# Patient Record
Sex: MALE | Race: WHITE | Employment: OTHER | URBAN - METROPOLITAN AREA
[De-identification: names, ages, dates, MRNs, and addresses within clinical notes are randomized per-mention and may not be internally consistent; named-entity substitution may affect disease eponyms.]

---

## 2017-09-18 ENCOUNTER — HOSPITAL ENCOUNTER (OUTPATIENT)
Dept: CT IMAGING | Age: 82
Discharge: HOME | End: 2017-09-18
Attending: INTERNAL MEDICINE
Payer: MEDICARE

## 2017-09-18 DIAGNOSIS — R41.3 MEMORY LOSS: ICD-10-CM

## 2017-09-18 PROCEDURE — 70450 CT HEAD/BRAIN W/O DYE: CPT

## 2017-09-18 NOTE — PROGRESS NOTES
Ct Head report reviewed and showed brain atrophy and possible mini-strokes that could have affected memory.  Please inform the patient

## 2017-12-19 ENCOUNTER — HOSPITAL ENCOUNTER (OUTPATIENT)
Dept: MAMMOGRAPHY | Age: 82
Discharge: HOME | End: 2017-12-19
Attending: INTERNAL MEDICINE
Payer: MEDICARE

## 2017-12-19 DIAGNOSIS — M81.0 OSTEOPOROSIS, UNSPECIFIED OSTEOPOROSIS TYPE, UNSPECIFIED PATHOLOGICAL FRACTURE PRESENCE: ICD-10-CM

## 2017-12-19 PROCEDURE — 77080 DXA BONE DENSITY AXIAL: CPT

## 2017-12-26 NOTE — PROGRESS NOTES
DEXA bone density report reviewed and shows osteopenia, but a FRAX score was not provided so will discuss need for treatment at next visit

## 2018-01-01 ENCOUNTER — APPOINTMENT (OUTPATIENT)
Dept: CT IMAGING | Age: 83
End: 2018-01-01
Attending: EMERGENCY MEDICINE
Payer: MEDICARE

## 2018-01-01 ENCOUNTER — HOSPITAL ENCOUNTER (INPATIENT)
Age: 83
LOS: 1 days | Discharge: HOME HEALTH | DRG: 193 | End: 2019-01-02
Attending: EMERGENCY MEDICINE | Admitting: INTERNAL MEDICINE
Payer: MEDICARE

## 2018-01-01 ENCOUNTER — APPOINTMENT (OUTPATIENT)
Dept: CT IMAGING | Age: 83
DRG: 193 | End: 2018-01-01
Attending: EMERGENCY MEDICINE
Payer: MEDICARE

## 2018-01-01 ENCOUNTER — APPOINTMENT (OUTPATIENT)
Dept: ULTRASOUND IMAGING | Age: 83
DRG: 193 | End: 2018-01-01
Attending: INTERNAL MEDICINE
Payer: MEDICARE

## 2018-01-01 ENCOUNTER — APPOINTMENT (OUTPATIENT)
Dept: MRI IMAGING | Age: 83
DRG: 193 | End: 2018-01-01
Attending: INTERNAL MEDICINE
Payer: MEDICARE

## 2018-01-01 ENCOUNTER — APPOINTMENT (OUTPATIENT)
Dept: GENERAL RADIOLOGY | Age: 83
DRG: 193 | End: 2018-01-01
Attending: EMERGENCY MEDICINE
Payer: MEDICARE

## 2018-01-01 ENCOUNTER — HOSPITAL ENCOUNTER (EMERGENCY)
Age: 83
Discharge: HOME | End: 2018-04-25
Attending: EMERGENCY MEDICINE
Payer: MEDICARE

## 2018-01-01 ENCOUNTER — HOSPITAL ENCOUNTER (EMERGENCY)
Age: 83
Discharge: HOME | End: 2018-05-01
Attending: EMERGENCY MEDICINE
Payer: MEDICARE

## 2018-01-01 VITALS
OXYGEN SATURATION: 98 % | BODY MASS INDEX: 25.1 KG/M2 | RESPIRATION RATE: 16 BRPM | WEIGHT: 147 LBS | HEIGHT: 64 IN | HEART RATE: 62 BPM | TEMPERATURE: 98 F | DIASTOLIC BLOOD PRESSURE: 70 MMHG | SYSTOLIC BLOOD PRESSURE: 148 MMHG

## 2018-01-01 VITALS
HEART RATE: 67 BPM | BODY MASS INDEX: 25.1 KG/M2 | OXYGEN SATURATION: 97 % | HEIGHT: 64 IN | WEIGHT: 147 LBS | TEMPERATURE: 97.8 F | SYSTOLIC BLOOD PRESSURE: 159 MMHG | DIASTOLIC BLOOD PRESSURE: 69 MMHG | RESPIRATION RATE: 16 BRPM

## 2018-01-01 DIAGNOSIS — R41.82 ALTERED MENTAL STATUS, UNSPECIFIED ALTERED MENTAL STATUS TYPE: ICD-10-CM

## 2018-01-01 DIAGNOSIS — S01.01XA LACERATION OF SCALP, INITIAL ENCOUNTER: Primary | ICD-10-CM

## 2018-01-01 DIAGNOSIS — Z48.02 ENCOUNTER FOR STAPLE REMOVAL: Primary | ICD-10-CM

## 2018-01-01 DIAGNOSIS — J18.9 COMMUNITY ACQUIRED PNEUMONIA OF RIGHT LOWER LOBE OF LUNG: Primary | ICD-10-CM

## 2018-01-01 LAB
ALBUMIN SERPL-MCNC: 4.3 G/DL (ref 3.2–4.6)
ALBUMIN/GLOB SERPL: 1.3 {RATIO}
ALP SERPL-CCNC: 100 U/L (ref 50–136)
ALT SERPL-CCNC: 26 U/L (ref 12–65)
ANION GAP SERPL CALC-SCNC: 10 MMOL/L
APPEARANCE UR: CLEAR
AST SERPL-CCNC: 20 U/L (ref 15–37)
BASOPHILS # BLD: 0 K/UL (ref 0–0.2)
BASOPHILS NFR BLD: 1 % (ref 0–2)
BILIRUB SERPL-MCNC: 0.8 MG/DL (ref 0.2–1.1)
BILIRUB UR QL: NEGATIVE
BUN SERPL-MCNC: 29 MG/DL (ref 8–23)
CALCIUM SERPL-MCNC: 9.5 MG/DL (ref 8.3–10.4)
CHLORIDE SERPL-SCNC: 102 MMOL/L (ref 98–107)
CO2 SERPL-SCNC: 25 MMOL/L (ref 21–32)
COLOR UR: YELLOW
CREAT SERPL-MCNC: 1.98 MG/DL (ref 0.8–1.5)
DIFFERENTIAL METHOD BLD: ABNORMAL
EOSINOPHIL # BLD: 0 K/UL (ref 0–0.8)
EOSINOPHIL NFR BLD: 0 % (ref 0.5–7.8)
ERYTHROCYTE [DISTWIDTH] IN BLOOD BY AUTOMATED COUNT: 12.9 % (ref 11.9–14.6)
FLUAV AG NPH QL IA: NEGATIVE
FLUBV AG NPH QL IA: NEGATIVE
GLOBULIN SER CALC-MCNC: 3.3 G/DL (ref 2.3–3.5)
GLUCOSE SERPL-MCNC: 129 MG/DL (ref 65–100)
GLUCOSE UR STRIP.AUTO-MCNC: NEGATIVE MG/DL
HCT VFR BLD AUTO: 43.7 % (ref 41.1–50.3)
HGB BLD-MCNC: 14.3 G/DL (ref 13.6–17.2)
HGB UR QL STRIP: NEGATIVE
IMM GRANULOCYTES # BLD: 0 K/UL (ref 0–0.5)
IMM GRANULOCYTES NFR BLD AUTO: 0 % (ref 0–5)
KETONES UR QL STRIP.AUTO: NEGATIVE MG/DL
LACTATE BLD-SCNC: 1.98 MMOL/L (ref 0.5–1.9)
LEUKOCYTE ESTERASE UR QL STRIP.AUTO: NEGATIVE
LYMPHOCYTES # BLD: 0.4 K/UL (ref 0.5–4.6)
LYMPHOCYTES NFR BLD: 7 % (ref 13–44)
MCH RBC QN AUTO: 32.9 PG (ref 26.1–32.9)
MCHC RBC AUTO-ENTMCNC: 32.7 G/DL (ref 31.4–35)
MCV RBC AUTO: 100.5 FL (ref 79.6–97.8)
MONOCYTES # BLD: 0.4 K/UL (ref 0.1–1.3)
MONOCYTES NFR BLD: 7 % (ref 4–12)
NEUTS SEG # BLD: 4.9 K/UL (ref 1.7–8.2)
NEUTS SEG NFR BLD: 85 % (ref 43–78)
NITRITE UR QL STRIP.AUTO: NEGATIVE
NRBC # BLD: 0 K/UL (ref 0–0.2)
PH UR STRIP: 6 [PH] (ref 5–9)
PLATELET # BLD AUTO: 284 K/UL (ref 150–450)
PMV BLD AUTO: 8.2 FL (ref 9.4–12.3)
POTASSIUM SERPL-SCNC: 4.7 MMOL/L (ref 3.5–5.1)
PROT SERPL-MCNC: 7.6 G/DL
PROT UR STRIP-MCNC: NEGATIVE MG/DL
RBC # BLD AUTO: 4.35 M/UL (ref 4.23–5.6)
SODIUM SERPL-SCNC: 137 MMOL/L (ref 136–145)
SP GR UR REFRACTOMETRY: 1.02 (ref 1–1.02)
SPECIMEN SOURCE: NORMAL
TROPONIN I SERPL-MCNC: <0.02 NG/ML (ref 0.02–0.05)
UROBILINOGEN UR QL STRIP.AUTO: 1 EU/DL (ref 0.2–1)
WBC # BLD AUTO: 5.7 K/UL (ref 4.3–11.1)

## 2018-01-01 PROCEDURE — 77030020263 HC SOL INJ SOD CL0.9% LFCR 1000ML

## 2018-01-01 PROCEDURE — 87040 BLOOD CULTURE FOR BACTERIA: CPT

## 2018-01-01 PROCEDURE — 99283 EMERGENCY DEPT VISIT LOW MDM: CPT | Performed by: EMERGENCY MEDICINE

## 2018-01-01 PROCEDURE — 70450 CT HEAD/BRAIN W/O DYE: CPT

## 2018-01-01 PROCEDURE — 93880 EXTRACRANIAL BILAT STUDY: CPT

## 2018-01-01 PROCEDURE — 81003 URINALYSIS AUTO W/O SCOPE: CPT

## 2018-01-01 PROCEDURE — 99218 HC RM OBSERVATION: CPT

## 2018-01-01 PROCEDURE — 70551 MRI BRAIN STEM W/O DYE: CPT

## 2018-01-01 PROCEDURE — 74011250636 HC RX REV CODE- 250/636: Performed by: EMERGENCY MEDICINE

## 2018-01-01 PROCEDURE — 85025 COMPLETE CBC W/AUTO DIFF WBC: CPT

## 2018-01-01 PROCEDURE — 96375 TX/PRO/DX INJ NEW DRUG ADDON: CPT | Performed by: EMERGENCY MEDICINE

## 2018-01-01 PROCEDURE — 87804 INFLUENZA ASSAY W/OPTIC: CPT

## 2018-01-01 PROCEDURE — 99285 EMERGENCY DEPT VISIT HI MDM: CPT | Performed by: EMERGENCY MEDICINE

## 2018-01-01 PROCEDURE — 77030019605

## 2018-01-01 PROCEDURE — 71045 X-RAY EXAM CHEST 1 VIEW: CPT

## 2018-01-01 PROCEDURE — 74011000258 HC RX REV CODE- 258: Performed by: EMERGENCY MEDICINE

## 2018-01-01 PROCEDURE — 80053 COMPREHEN METABOLIC PANEL: CPT

## 2018-01-01 PROCEDURE — 77030008031

## 2018-01-01 PROCEDURE — 93005 ELECTROCARDIOGRAM TRACING: CPT | Performed by: EMERGENCY MEDICINE

## 2018-01-01 PROCEDURE — 51798 US URINE CAPACITY MEASURE: CPT

## 2018-01-01 PROCEDURE — 75810000275 HC EMERGENCY DEPT VISIT NO LEVEL OF CARE: Performed by: EMERGENCY MEDICINE

## 2018-01-01 PROCEDURE — 77030008462 HC STPLR SKN PROX J&J -A

## 2018-01-01 PROCEDURE — 84484 ASSAY OF TROPONIN QUANT: CPT

## 2018-01-01 PROCEDURE — 74011250636 HC RX REV CODE- 250/636: Performed by: INTERNAL MEDICINE

## 2018-01-01 PROCEDURE — 96365 THER/PROPH/DIAG IV INF INIT: CPT | Performed by: EMERGENCY MEDICINE

## 2018-01-01 PROCEDURE — 83605 ASSAY OF LACTIC ACID: CPT

## 2018-01-01 PROCEDURE — 75810000293 HC SIMP/SUPERF WND  RPR: Performed by: EMERGENCY MEDICINE

## 2018-01-01 PROCEDURE — 96367 TX/PROPH/DG ADDL SEQ IV INF: CPT | Performed by: EMERGENCY MEDICINE

## 2018-01-01 PROCEDURE — 74011250637 HC RX REV CODE- 250/637: Performed by: INTERNAL MEDICINE

## 2018-01-01 RX ORDER — SODIUM CHLORIDE 0.9 % (FLUSH) 0.9 %
5-10 SYRINGE (ML) INJECTION AS NEEDED
Status: DISCONTINUED | OUTPATIENT
Start: 2018-01-01 | End: 2019-01-01 | Stop reason: HOSPADM

## 2018-01-01 RX ORDER — HEPARIN SODIUM 5000 [USP'U]/ML
5000 INJECTION, SOLUTION INTRAVENOUS; SUBCUTANEOUS EVERY 8 HOURS
Status: DISCONTINUED | OUTPATIENT
Start: 2018-01-01 | End: 2019-01-01 | Stop reason: HOSPADM

## 2018-01-01 RX ORDER — BISACODYL 5 MG
5 TABLET, DELAYED RELEASE (ENTERIC COATED) ORAL DAILY PRN
Status: DISCONTINUED | OUTPATIENT
Start: 2018-01-01 | End: 2019-01-01 | Stop reason: HOSPADM

## 2018-01-01 RX ORDER — SIMVASTATIN 20 MG/1
20 TABLET, FILM COATED ORAL
Status: DISCONTINUED | OUTPATIENT
Start: 2018-01-01 | End: 2019-01-01 | Stop reason: HOSPADM

## 2018-01-01 RX ORDER — BUPROPION HYDROCHLORIDE 150 MG/1
150 TABLET, EXTENDED RELEASE ORAL DAILY
Status: DISCONTINUED | OUTPATIENT
Start: 2019-01-01 | End: 2019-01-01 | Stop reason: HOSPADM

## 2018-01-01 RX ORDER — ONDANSETRON 2 MG/ML
4 INJECTION INTRAMUSCULAR; INTRAVENOUS
Status: DISCONTINUED | OUTPATIENT
Start: 2018-01-01 | End: 2019-01-01 | Stop reason: HOSPADM

## 2018-01-01 RX ORDER — AMLODIPINE BESYLATE 10 MG/1
5 TABLET ORAL DAILY
Status: DISCONTINUED | OUTPATIENT
Start: 2019-01-01 | End: 2019-01-01

## 2018-01-01 RX ORDER — ACETAMINOPHEN 325 MG/1
650 TABLET ORAL
Status: DISCONTINUED | OUTPATIENT
Start: 2018-01-01 | End: 2019-01-01 | Stop reason: HOSPADM

## 2018-01-01 RX ORDER — PANTOPRAZOLE SODIUM 40 MG/1
40 TABLET, DELAYED RELEASE ORAL
Status: DISCONTINUED | OUTPATIENT
Start: 2019-01-01 | End: 2019-01-01 | Stop reason: HOSPADM

## 2018-01-01 RX ORDER — SODIUM CHLORIDE 9 MG/ML
125 INJECTION, SOLUTION INTRAVENOUS CONTINUOUS
Status: DISCONTINUED | OUTPATIENT
Start: 2018-01-01 | End: 2019-01-01

## 2018-01-01 RX ORDER — SODIUM CHLORIDE 0.9 % (FLUSH) 0.9 %
5-10 SYRINGE (ML) INJECTION EVERY 8 HOURS
Status: DISCONTINUED | OUTPATIENT
Start: 2018-01-01 | End: 2019-01-01 | Stop reason: HOSPADM

## 2018-01-01 RX ORDER — GUAIFENESIN 100 MG/5ML
81 LIQUID (ML) ORAL DAILY
Status: DISCONTINUED | OUTPATIENT
Start: 2019-01-01 | End: 2019-01-01 | Stop reason: HOSPADM

## 2018-01-01 RX ORDER — ONDANSETRON 2 MG/ML
4 INJECTION INTRAMUSCULAR; INTRAVENOUS
Status: COMPLETED | OUTPATIENT
Start: 2018-01-01 | End: 2018-01-01

## 2018-01-01 RX ADMIN — HEPARIN SODIUM 5000 UNITS: 5000 INJECTION INTRAVENOUS; SUBCUTANEOUS at 18:16

## 2018-01-01 RX ADMIN — SIMVASTATIN 20 MG: 20 TABLET, FILM COATED ORAL at 22:52

## 2018-01-01 RX ADMIN — CEFTRIAXONE SODIUM 2 G: 2 INJECTION, POWDER, FOR SOLUTION INTRAMUSCULAR; INTRAVENOUS at 13:57

## 2018-01-01 RX ADMIN — SODIUM CHLORIDE 10 ML: 9 INJECTION, SOLUTION INTRAMUSCULAR; INTRAVENOUS; SUBCUTANEOUS at 18:21

## 2018-01-01 RX ADMIN — AZITHROMYCIN MONOHYDRATE 500 MG: 500 INJECTION, POWDER, LYOPHILIZED, FOR SOLUTION INTRAVENOUS at 15:23

## 2018-01-01 RX ADMIN — SODIUM CHLORIDE 10 ML: 9 INJECTION, SOLUTION INTRAMUSCULAR; INTRAVENOUS; SUBCUTANEOUS at 22:52

## 2018-01-01 RX ADMIN — SODIUM CHLORIDE 500 ML: 900 INJECTION, SOLUTION INTRAVENOUS at 18:17

## 2018-01-01 RX ADMIN — SODIUM CHLORIDE 500 ML: 900 INJECTION, SOLUTION INTRAVENOUS at 13:58

## 2018-01-01 RX ADMIN — SODIUM CHLORIDE 125 ML/HR: 900 INJECTION, SOLUTION INTRAVENOUS at 18:53

## 2018-01-01 RX ADMIN — ONDANSETRON 4 MG: 2 INJECTION INTRAMUSCULAR; INTRAVENOUS at 12:30

## 2018-02-24 ENCOUNTER — HOSPITAL ENCOUNTER (OUTPATIENT)
Age: 83
Setting detail: OBSERVATION
Discharge: NURSING, REHAB OR LONG-TERM CARE | End: 2018-02-25
Attending: EMERGENCY MEDICINE | Admitting: INTERNAL MEDICINE
Payer: MEDICARE

## 2018-02-24 ENCOUNTER — APPOINTMENT (OUTPATIENT)
Dept: GENERAL RADIOLOGY | Age: 83
End: 2018-02-24
Attending: EMERGENCY MEDICINE
Payer: MEDICARE

## 2018-02-24 ENCOUNTER — APPOINTMENT (OUTPATIENT)
Dept: CT IMAGING | Age: 83
End: 2018-02-24
Attending: EMERGENCY MEDICINE
Payer: MEDICARE

## 2018-02-24 DIAGNOSIS — G45.9 TRANSIENT CEREBRAL ISCHEMIA, UNSPECIFIED TYPE: Primary | ICD-10-CM

## 2018-02-24 DIAGNOSIS — N28.9 ACUTE RENAL INSUFFICIENCY: ICD-10-CM

## 2018-02-24 PROBLEM — E86.0 DEHYDRATION: Status: ACTIVE | Noted: 2018-02-24

## 2018-02-24 LAB
ALBUMIN SERPL-MCNC: 3.8 G/DL (ref 3.2–4.6)
ALBUMIN/GLOB SERPL: 1.2 {RATIO} (ref 1.2–3.5)
ALP SERPL-CCNC: 81 U/L (ref 50–136)
ALT SERPL-CCNC: 13 U/L (ref 12–65)
ANION GAP SERPL CALC-SCNC: 12 MMOL/L (ref 7–16)
APPEARANCE UR: ABNORMAL
AST SERPL-CCNC: 20 U/L (ref 15–37)
BACTERIA URNS QL MICRO: 0 /HPF
BASOPHILS # BLD: 0 K/UL (ref 0–0.2)
BASOPHILS NFR BLD: 0 % (ref 0–2)
BILIRUB SERPL-MCNC: 0.7 MG/DL (ref 0.2–1.1)
BILIRUB UR QL: ABNORMAL
BUN SERPL-MCNC: 29 MG/DL (ref 8–23)
CALCIUM SERPL-MCNC: 9.3 MG/DL (ref 8.3–10.4)
CASTS URNS QL MICRO: ABNORMAL /LPF
CHLORIDE SERPL-SCNC: 103 MMOL/L (ref 98–107)
CO2 SERPL-SCNC: 26 MMOL/L (ref 21–32)
COLOR UR: ABNORMAL
CREAT SERPL-MCNC: 2.1 MG/DL (ref 0.8–1.5)
DIFFERENTIAL METHOD BLD: ABNORMAL
EOSINOPHIL # BLD: 0 K/UL (ref 0–0.8)
EOSINOPHIL NFR BLD: 0 % (ref 0.5–7.8)
EPI CELLS #/AREA URNS HPF: ABNORMAL /HPF
ERYTHROCYTE [DISTWIDTH] IN BLOOD BY AUTOMATED COUNT: 13.5 % (ref 11.9–14.6)
GLOBULIN SER CALC-MCNC: 3.3 G/DL (ref 2.3–3.5)
GLUCOSE SERPL-MCNC: 123 MG/DL (ref 65–100)
GLUCOSE UR STRIP.AUTO-MCNC: NEGATIVE MG/DL
HCT VFR BLD AUTO: 38.8 % (ref 41.1–50.3)
HGB BLD-MCNC: 13.1 G/DL (ref 13.6–17.2)
HGB UR QL STRIP: NEGATIVE
IMM GRANULOCYTES # BLD: 0 K/UL (ref 0–0.5)
IMM GRANULOCYTES NFR BLD AUTO: 0 % (ref 0–5)
KETONES UR QL STRIP.AUTO: ABNORMAL MG/DL
LACTATE BLD-SCNC: 1.8 MMOL/L (ref 0.5–1.9)
LEUKOCYTE ESTERASE UR QL STRIP.AUTO: ABNORMAL
LYMPHOCYTES # BLD: 1.1 K/UL (ref 0.5–4.6)
LYMPHOCYTES NFR BLD: 16 % (ref 13–44)
MAGNESIUM SERPL-MCNC: 2.5 MG/DL (ref 1.8–2.4)
MCH RBC QN AUTO: 33.2 PG (ref 26.1–32.9)
MCHC RBC AUTO-ENTMCNC: 33.8 G/DL (ref 31.4–35)
MCV RBC AUTO: 98.2 FL (ref 79.6–97.8)
MONOCYTES # BLD: 0.5 K/UL (ref 0.1–1.3)
MONOCYTES NFR BLD: 8 % (ref 4–12)
NEUTS SEG # BLD: 4.9 K/UL (ref 1.7–8.2)
NEUTS SEG NFR BLD: 76 % (ref 43–78)
NITRITE UR QL STRIP.AUTO: NEGATIVE
PH UR STRIP: 5 [PH] (ref 5–9)
PLATELET # BLD AUTO: 189 K/UL (ref 150–450)
PMV BLD AUTO: 9.2 FL (ref 10.8–14.1)
POTASSIUM SERPL-SCNC: 4.5 MMOL/L (ref 3.5–5.1)
PROCALCITONIN SERPL-MCNC: 0.1 NG/ML
PROT SERPL-MCNC: 7.1 G/DL (ref 6.3–8.2)
PROT UR STRIP-MCNC: ABNORMAL MG/DL
RBC # BLD AUTO: 3.95 M/UL (ref 4.23–5.67)
SODIUM SERPL-SCNC: 141 MMOL/L (ref 136–145)
SP GR UR REFRACTOMETRY: 1.03 (ref 1–1.02)
TROPONIN I BLD-MCNC: 0.01 NG/ML (ref 0.02–0.05)
UROBILINOGEN UR QL STRIP.AUTO: 1 EU/DL (ref 0.2–1)
WBC # BLD AUTO: 6.5 K/UL (ref 4.3–11.1)
WBC URNS QL MICRO: ABNORMAL /HPF

## 2018-02-24 PROCEDURE — 81003 URINALYSIS AUTO W/O SCOPE: CPT | Performed by: EMERGENCY MEDICINE

## 2018-02-24 PROCEDURE — 84484 ASSAY OF TROPONIN QUANT: CPT

## 2018-02-24 PROCEDURE — 99218 HC RM OBSERVATION: CPT

## 2018-02-24 PROCEDURE — 96361 HYDRATE IV INFUSION ADD-ON: CPT

## 2018-02-24 PROCEDURE — 99285 EMERGENCY DEPT VISIT HI MDM: CPT | Performed by: EMERGENCY MEDICINE

## 2018-02-24 PROCEDURE — 84145 PROCALCITONIN (PCT): CPT | Performed by: EMERGENCY MEDICINE

## 2018-02-24 PROCEDURE — 83605 ASSAY OF LACTIC ACID: CPT

## 2018-02-24 PROCEDURE — 96372 THER/PROPH/DIAG INJ SC/IM: CPT

## 2018-02-24 PROCEDURE — 80053 COMPREHEN METABOLIC PANEL: CPT | Performed by: EMERGENCY MEDICINE

## 2018-02-24 PROCEDURE — 87086 URINE CULTURE/COLONY COUNT: CPT | Performed by: EMERGENCY MEDICINE

## 2018-02-24 PROCEDURE — 74011250637 HC RX REV CODE- 250/637: Performed by: INTERNAL MEDICINE

## 2018-02-24 PROCEDURE — 71045 X-RAY EXAM CHEST 1 VIEW: CPT

## 2018-02-24 PROCEDURE — 70450 CT HEAD/BRAIN W/O DYE: CPT

## 2018-02-24 PROCEDURE — 83735 ASSAY OF MAGNESIUM: CPT | Performed by: EMERGENCY MEDICINE

## 2018-02-24 PROCEDURE — 74011250636 HC RX REV CODE- 250/636: Performed by: EMERGENCY MEDICINE

## 2018-02-24 PROCEDURE — 93005 ELECTROCARDIOGRAM TRACING: CPT | Performed by: INTERNAL MEDICINE

## 2018-02-24 PROCEDURE — 74011250636 HC RX REV CODE- 250/636: Performed by: INTERNAL MEDICINE

## 2018-02-24 PROCEDURE — 96360 HYDRATION IV INFUSION INIT: CPT | Performed by: EMERGENCY MEDICINE

## 2018-02-24 PROCEDURE — 85025 COMPLETE CBC W/AUTO DIFF WBC: CPT | Performed by: EMERGENCY MEDICINE

## 2018-02-24 RX ORDER — SODIUM CHLORIDE 9 MG/ML
100 INJECTION, SOLUTION INTRAVENOUS CONTINUOUS
Status: DISCONTINUED | OUTPATIENT
Start: 2018-02-24 | End: 2018-02-26 | Stop reason: HOSPADM

## 2018-02-24 RX ORDER — SODIUM CHLORIDE 0.9 % (FLUSH) 0.9 %
5-10 SYRINGE (ML) INJECTION AS NEEDED
Status: DISCONTINUED | OUTPATIENT
Start: 2018-02-24 | End: 2018-02-26 | Stop reason: HOSPADM

## 2018-02-24 RX ORDER — GUAIFENESIN 100 MG/5ML
81 LIQUID (ML) ORAL DAILY
Status: DISCONTINUED | OUTPATIENT
Start: 2018-02-25 | End: 2018-02-26 | Stop reason: HOSPADM

## 2018-02-24 RX ORDER — AMLODIPINE BESYLATE 5 MG/1
5 TABLET ORAL DAILY
Status: DISCONTINUED | OUTPATIENT
Start: 2018-02-25 | End: 2018-02-26 | Stop reason: HOSPADM

## 2018-02-24 RX ORDER — HEPARIN SODIUM 5000 [USP'U]/ML
5000 INJECTION, SOLUTION INTRAVENOUS; SUBCUTANEOUS EVERY 12 HOURS
Status: DISCONTINUED | OUTPATIENT
Start: 2018-02-24 | End: 2018-02-26 | Stop reason: HOSPADM

## 2018-02-24 RX ORDER — ONDANSETRON 2 MG/ML
4 INJECTION INTRAMUSCULAR; INTRAVENOUS
Status: DISCONTINUED | OUTPATIENT
Start: 2018-02-24 | End: 2018-02-26 | Stop reason: HOSPADM

## 2018-02-24 RX ORDER — SIMVASTATIN 20 MG/1
20 TABLET, FILM COATED ORAL
Status: DISCONTINUED | OUTPATIENT
Start: 2018-02-24 | End: 2018-02-26 | Stop reason: HOSPADM

## 2018-02-24 RX ORDER — LANOLIN ALCOHOL/MO/W.PET/CERES
400 CREAM (GRAM) TOPICAL DAILY
Status: DISCONTINUED | OUTPATIENT
Start: 2018-02-25 | End: 2018-02-26 | Stop reason: HOSPADM

## 2018-02-24 RX ORDER — SODIUM CHLORIDE 0.9 % (FLUSH) 0.9 %
5-10 SYRINGE (ML) INJECTION EVERY 8 HOURS
Status: DISCONTINUED | OUTPATIENT
Start: 2018-02-24 | End: 2018-02-26 | Stop reason: HOSPADM

## 2018-02-24 RX ORDER — LABETALOL HYDROCHLORIDE 5 MG/ML
5 INJECTION, SOLUTION INTRAVENOUS
Status: DISCONTINUED | OUTPATIENT
Start: 2018-02-24 | End: 2018-02-26 | Stop reason: HOSPADM

## 2018-02-24 RX ORDER — PANTOPRAZOLE SODIUM 40 MG/1
40 TABLET, DELAYED RELEASE ORAL
Status: DISCONTINUED | OUTPATIENT
Start: 2018-02-25 | End: 2018-02-26 | Stop reason: HOSPADM

## 2018-02-24 RX ADMIN — HEPARIN SODIUM 5000 UNITS: 5000 INJECTION, SOLUTION INTRAVENOUS; SUBCUTANEOUS at 20:12

## 2018-02-24 RX ADMIN — SIMVASTATIN 20 MG: 20 TABLET, FILM COATED ORAL at 20:15

## 2018-02-24 RX ADMIN — Medication 10 ML: at 20:12

## 2018-02-24 RX ADMIN — SODIUM CHLORIDE 100 ML/HR: 900 INJECTION, SOLUTION INTRAVENOUS at 20:12

## 2018-02-24 RX ADMIN — SODIUM CHLORIDE 1000 ML: 900 INJECTION, SOLUTION INTRAVENOUS at 16:08

## 2018-02-24 NOTE — IP AVS SNAPSHOT
[address] 
[phone] Patient: Jammie Guzman MRN: [id number] About your hospitalization You were admitted on:  2018 You last received care in the:  [facility] 3 ICU You were discharged on:  2018 Why you were hospitalized Your primary diagnosis was:  Tia (Transient Ischemic Attack) Your diagnoses also included:  Dehydration Follow-up Information Follow up With Details Comments Contact Info Caryl Martin MD  Please make appointment in 1 to 2 weeks [address] [place] [phone] Your Scheduled Appointments 2018  8:40 AM EST New Patient with Ignacio Gutierrez MD  
[facility]) [address] [place] [address]  
[phone] 2018  1:45 PM EDT  
(Arrive by 1:30 PM) Office Visit with Caryl Martin MD  
[address]) [address] [place] [phone] Discharge Orders None A check hill indicates which time of day the medication should be taken. My Medications CONTINUE taking these medications Instructions Each Dose to Equal  
 Morning Noon Evening Bedtime  
 amLODIPine 5 mg tablet Commonly known as:  Ardeen Squire Take 1 Tab by mouth daily. 5 mg Start taking 18  
   
   
   
  
 aspirin 81 mg chewable tablet Take 81 mg by mouth daily. 81 mg  
    
Start taking 18  
   
   
   
  
 lovastatin 40 mg tablet Commonly known as:  MEVACOR Take 1 Tab by mouth nightly. 40 mg Take this evening 18 as prescribed  
  
 magnesium 250 mg Tab Take  by mouth. Start taking 18  
   
   
   
  
 omeprazole 20 mg capsule Commonly known as:  PRILOSEC  
   
 Take 1 Cap by mouth daily. 20 mg  
    
Start taking 2/26/18 Discharge Instructions Transient Ischemic Attack: Care Instructions Your Care Instructions A transient ischemic attack (TIA) is when blood flow to a part of your brain is blocked for a short time. A TIA is like a stroke but usually lasts only a few minutes. A TIA does not cause lasting brain damage. Any vision problems, slurred speech, or other symptoms usually go away in 10 to 20 minutes. But they may last for up to 24 hours. TIAs are often warning signs of a stroke. Some people who have a TIA may have a stroke in the future. A stroke can cause symptoms like those of a TIA. But a stroke causes lasting damage to your brain. You can take steps to help prevent a stroke. One thing you can do is get early treatment. If you have other new symptoms, or if your symptoms do not get better, go back to the emergency room or call your doctor right away. Getting treatment right away may prevent long-term brain damage caused by a stroke. The doctor has checked you carefully, but problems can develop later. If you notice any problems or new symptoms, get medical treatment right away. Follow-up care is a key part of your treatment and safety. Be sure to make and go to all appointments, and call your doctor if you are having problems. It's also a good idea to know your test results and keep a list of the medicines you take. How can you care for yourself at home? Medicines ? · Be safe with medicines. Take your medicines exactly as prescribed. Call your doctor if you think you are having a problem with your medicine. ? · If you take a blood thinner, such as aspirin, be sure you get instructions about how to take your medicine safely. Blood thinners can cause serious bleeding problems. ? · Call your doctor if you are not able to take your medicines for any reason. ? · Do not take any over-the-counter medicines or herbal products without talking to your doctor first.  
? · If you take birth control pills or hormone therapy, talk to your doctor. Ask if these treatments are right for you. ? Lifestyle changes ? · Do not smoke. If you need help quitting, talk to your doctor about stop-smoking programs and medicines. ? · Be active. If your doctor recommends it, get more exercise. Walking is a good choice. Bit by bit, increase the amount you walk every day. Try for at least 30 minutes on most days of the week. You also may want to swim, bike, or do other activities. ? · Eat heart-healthy foods. These include fruits, vegetables, high-fiber foods, fish, and foods that are low in sodium, saturated fat, and trans fat. ? · Stay at a healthy weight. Lose weight if you need to.  
? · Limit alcohol to 2 drinks a day for men and 1 drink a day for women. ?Staying healthy ? · Manage other health problems such as diabetes, high blood pressure, and high cholesterol. ? · Get the flu vaccine every year. When should you call for help? Call 911 anytime you think you may need emergency care. For example, call if: 
? · You have new or worse symptoms of a stroke. These may include: 
¨ Sudden numbness, tingling, weakness, or loss of movement in your face, arm, or leg, especially on only one side of your body. ¨ Sudden vision changes. ¨ Sudden trouble speaking. ¨ Sudden confusion or trouble understanding simple statements. ¨ Sudden problems with walking or balance. ¨ A sudden, severe headache that is different from past headaches. Call 911 even if these symptoms go away in a few minutes. ? · You feel like you are having another TIA. ? Watch closely for changes in your health, and be sure to contact your doctor if you have any problems. Where can you learn more? Go to http://red-alena.info/. Enter ([phone] in the search box to learn more about \"Transient Ischemic Attack: Care Instructions. \" Current as of: March 20, 2017 Content Version: 11.4 © 4312-6695 [employer]. Care instructions adapted under license by [employer] (which disclaims liability or warranty for this information). If you have questions about a medical condition or this instruction, always ask your healthcare professional. [place] [address] any warranty or liability for your use of this information. Dehydration: Care Instructions Your Care Instructions Dehydration happens when your body loses too much fluid. This might happen when you do not drink enough water or you lose large amounts of fluids from your body because of diarrhea, vomiting, or sweating. Severe dehydration can be life-threatening. Water and minerals called electrolytes help put your body fluids back in balance. Learn the early signs of fluid loss, and drink more fluids to prevent dehydration. Follow-up care is a key part of your treatment and safety. Be sure to make and go to all appointments, and call your doctor if you are having problems. It's also a good idea to know your test results and keep a list of the medicines you take. How can you care for yourself at home? · To prevent dehydration, drink plenty of fluids, enough so that your urine is light yellow or clear like water. Choose water and other caffeine-free clear liquids until you feel better. If you have kidney, heart, or liver disease and have to limit fluids, talk with your doctor before you increase the amount of fluids you drink. · If you do not feel like eating or drinking, try taking small sips of water, sports drinks, or other rehydration drinks. · Get plenty of rest. 
To prevent dehydration · Add more fluids to your diet and daily routine, unless your doctor has told you not to. · During hot weather, drink more fluids.  Drink even more fluids if you exercise a lot. Stay away from drinks with alcohol or caffeine. · Watch for the symptoms of dehydration. These include: ¨ A dry, sticky mouth. ¨ Dark yellow urine, and not much of it. ¨ Dry and sunken eyes. ¨ Feeling very tired. · Learn what problems can lead to dehydration. These include: ¨ Diarrhea, fever, and vomiting. ¨ Any illness with a fever, such as pneumonia or the flu. ¨ Activities that cause heavy sweating, such as endurance races and heavy outdoor work in hot or humid weather. ¨ Alcohol or drug abuse or withdrawal. 
¨ Certain medicines, such as cold and allergy pills (antihistamines), diet pills (diuretics), and laxatives. ¨ Certain diseases, such as diabetes, cancer, and heart or kidney disease. When should you call for help? Call 911 anytime you think you may need emergency care. For example, call if: 
? · You passed out (lost consciousness). ?Call your doctor now or seek immediate medical care if: 
? · You are confused and cannot think clearly. ? · You are dizzy or lightheaded, or you feel like you may faint. ? · You have signs of needing more fluids. You have sunken eyes and a dry mouth, and you pass only a little dark urine. ? · You cannot keep fluids down. ? Watch closely for changes in your health, and be sure to contact your doctor if: 
? · You are not making tears. ? · Your skin is very dry and sags slowly back into place after you pinch it. ? · Your mouth and eyes are very dry. Where can you learn more? Go to http://red-alena.info/. Enter G714 in the search box to learn more about \"Dehydration: Care Instructions. \" Current as of: March 20, 2017 Content Version: 11.4 © 7326-4368 [employer]. Care instructions adapted under license by [employer] (which disclaims liability or warranty for this information).  If you have questions about a medical condition or this instruction, always ask your healthcare professional. [place] [address] any warranty or liability for your use of this information. DISCHARGE SUMMARY from Nurse PATIENT INSTRUCTIONS: 
 
 
F-face looks uneven A-arms unable to move or move unevenly S-speech slurred or non-existent T-time-call 911 as soon as signs and symptoms begin-DO NOT go Back to bed or wait to see if you get better-TIME IS BRAIN. Warning Signs of HEART ATTACK Call 911 if you have these symptoms: 
? Chest discomfort. Most heart attacks involve discomfort in the center of the chest that lasts more than a few minutes, or that goes away and comes back. It can feel like uncomfortable pressure, squeezing, fullness, or pain. ? Discomfort in other areas of the upper body. Symptoms can include pain or discomfort in one or both arms, the back, neck, jaw, or stomach. ? Shortness of breath with or without chest discomfort. ? Other signs may include breaking out in a cold sweat, nausea, or lightheadedness. Don't wait more than five minutes to call [address]! Fast action can save your life. Calling 911 is almost always the fastest way to get lifesaving treatment. Emergency Medical Services staff can begin treatment when they arrive  up to an hour sooner than if someone gets to the hospital by car. The discharge information has been reviewed with the patient. The patient verbalized understanding. Discharge medications reviewed with the patient and appropriate educational materials and side effects teaching were provided.  
___________________________________________________________________________ ________________________________________________________ ACO Transitions of Care Introducing [place] [address] Cary Valenzuela offers a voluntary care coordination program to provide high quality service and care to [facility] fee-for-service beneficiaries. Jony Conn was designed to help you enhance your health and well-being through the following services: ? Transitions of Care  support for individuals who are transitioning from one care setting to another (example: Hospital to home). ? Chronic and Complex Care Coordination  support for individuals and caregivers of those with serious or chronic illnesses or with more than one chronic (ongoing) condition and those who take a number of different medications. If you meet specific medical criteria, a [address] [place] [address] Rd may call you directly to coordinate your care with your primary care physician and your other care providers. For questions about the [facility] programs, please, contact your physicians office. For general questions or additional information about Accountable Care Organizations: 
Please visit www.medicare.gov/acos. html or call 1-800-MEDICARE (4-705.739.3633) TTY users should call 2-781.602.8664. Building[employer] Announcement We are excited to announce that we are making your provider's discharge notes available to you in Buildingeye. You will see these notes when they are completed and signed by the physician that discharged you from your recent hospital stay. If you have any questions or concerns about any information you see in [email or web address].rut, please call the Health Information Department where you were seen or reach out to your Primary Care Provider for more information about your plan of care. Introducing [facility]! Dear Param Ibrahim: 
Thank you for requesting a Buildingeye account.   Our records indicate that you already have an active [employer] account. You can access your account anytime at [email or web address] Did you know that you can access your hospital and ER discharge instructions at any time in [employer]? You can also review all of your test results from your hospital stay or ER visit. Additional Information If you have questions, please visit the Frequently Asked Questions section of the [employer] website at [email or web address] Remember, [employer] is NOT to be used for urgent needs. For medical emergencies, dial 911. Now available from your iPhone and Android! Unresulted Labs-Please follow up with your PCP about these lab tests Order Current Status CULTURE, URINE Preliminary result Providers Seen During Your Hospitalization Provider Specialty Primary office phone Mark Lin MD Emergency Medicine [phone] Damion Johnson MD Internal Medicine [phone] Your Primary Care Physician (PCP) Primary Care Physician Office Phone Office Fax Monge Face [phone] You are allergic to the following No active allergies Recent Documentation Height Weight BMI Smoking Status 1.626 m 70.8 kg 26.78 kg/m2 Former Smoker Emergency Contacts Name Discharge Info Relation Home Work Mobile Arther Severance  Son [22] [phone] Patient Belongings The following personal items are in your possession at time of discharge: 
  Dental Appliances: None  Visual Aid: Glasses, Sent home      Home Medications: None   Jewelry: Ring (yellow metal wedding band)  Clothing: Pants, Undergarments, Shirt, Footwear, At bedside    Other Valuables: Wallet, [address], [place], Money clip, Sent home (son sunday)  Personal Items Sent to Safe: none Please provide this summary of care documentation to your next provider. Signatures-by signing, you are acknowledging that this After Visit Summary has been reviewed with you and you have received a copy. Patient Signature:  ____________________________________________________________ Date:  ____________________________________________________________  
  
Tessy Agnes Provider Signature:  ____________________________________________________________ Date:  ____________________________________________________________

## 2018-02-24 NOTE — ED NOTES
TRANSFER - OUT REPORT:    Verbal report given to Sudhakar Fernandes on Silvio Rubio  being transferred to [phone] for routine progression of care       Report consisted of patients Situation, Background, Assessment and   Recommendations(SBAR). Information from the following report(s) SBAR, ED Summary and MAR was reviewed with the receiving nurse. Lines:   Peripheral IV 02/24/18 Left Antecubital (Active)   Site Assessment Clean, dry, & intact 2/24/2018  3:00 PM   Phlebitis Assessment 0 2/24/2018  3:00 PM   Infiltration Assessment 0 2/24/2018  3:00 PM   Dressing Status Clean, dry, & intact 2/24/2018  3:00 PM   Action Taken Blood drawn 2/24/2018  3:00 PM        Opportunity for questions and clarification was provided.       Patient transported with:   Monitor  Registered Nurse

## 2018-02-24 NOTE — IP AVS SNAPSHOT
[address] [facility] 
[phone] Patient: Aniya Levy MRN: [id number] VPD:55/56/5077 A check hill indicates which time of day the medication should be taken. My Medications CONTINUE taking these medications Instructions Each Dose to Equal  
 Morning Noon Evening Bedtime  
 amLODIPine 5 mg tablet Commonly known as:  Connie Torres Take 1 Tab by mouth daily. 5 mg Start taking 2/26/18  
   
   
   
  
 aspirin 81 mg chewable tablet Take 81 mg by mouth daily. 81 mg  
    
Start taking 2/26/18  
   
   
   
  
 lovastatin 40 mg tablet Commonly known as:  MEVACOR Take 1 Tab by mouth nightly. 40 mg Take this evening 2/25/18 as prescribed  
  
 magnesium 250 mg Tab Take  by mouth. Start taking 2/26/18  
   
   
   
  
 omeprazole 20 mg capsule Commonly known as:  PRILOSEC Take 1 Cap by mouth daily. 20 mg  
    
Start taking 2/26/18 pregnancy problem/26 week c/o abd cramping

## 2018-02-24 NOTE — ED TRIAGE NOTES
Pt in via [facility] from [place]. Per ems pt did not eat breakfast. States at lunch staff report pt not acting well. States family visited pt and pt was \"catatonic\" pt left side weakness with left side facial droop. States bgl 119 with ems. States symptoms have improved. States left side leg weakness.

## 2018-02-24 NOTE — ED PROVIDER NOTES
HPI Comments: Patient presents to the ER for altered mental status. Apparently patient lives in an assisted living facility. He has been having some weakness and a little bronchitis over the past week. Staff state reason did not attend breakfast this morning. At what just a family thought he generally is more weak, was having some difficulty speaking and possibly some left-sided weakness. EMS was called. Upon arrival patient appeared to be generalized weak however some increased weakness on the left side was noted. Symptoms have seemed to improve. Patient's only complaint is feeling \"cold and weak\". Patient is a 80 y.o. male presenting with altered mental status. Altered mental status    This is a new problem. The current episode started 3 to 5 hours ago. Associated symptoms include confusion and weakness. Pertinent negatives include no numbness. Past Medical History:   Diagnosis Date    Diverticulitis     Essential (primary) hypertension     GERD (gastroesophageal reflux disease)     Hyperlipidemia     Memory loss     Osteoporosis     Pulmonary embolism ([facility] [address]     only one episode with simultaneous DVT.   Patient and family deny hypercoaguable work up   Yassine Moreira Varicella     Volvulus of colon ([facility] [address]        Past Surgical History:   Procedure Laterality Date    HX HERNIA REPAIR      HX LAP CHOLECYSTECTOMY      HX TONSIL AND ADENOIDECTOMY           Family History:   Problem Relation Age of Onset    Diabetes Mother        Social History     Social History    Marital status:      Spouse name: N/A    Number of children: N/A    Years of education: N/A     Occupational History    Industrial Management      Social History Main Topics    Smoking status: Former Smoker     Quit date: 1/1/1980    Smokeless tobacco: Never Used    Alcohol use 3.6 oz/week     6 Standard drinks or equivalent per week      Comment: daily    Drug use: Not on file    Sexual activity: Not on file     Other Topics Concern    Not on file     Social History Narrative         ALLERGIES: Review of patient's allergies indicates no known allergies. Review of Systems   Constitutional: Negative for fatigue, fever and unexpected weight change. HENT: Negative for congestion and dental problem. Eyes: Negative for photophobia, redness and visual disturbance. Respiratory: Negative for cough, chest tightness and shortness of breath. Cardiovascular: Negative for palpitations and leg swelling. Gastrointestinal: Negative for abdominal pain. Endocrine: Negative for polydipsia, polyphagia and polyuria. Genitourinary: Negative for flank pain and frequency. Musculoskeletal: Negative for back pain and joint swelling. Skin: Negative for pallor and rash. Allergic/Immunologic: Negative for food allergies and immunocompromised state. Neurological: Positive for weakness. Negative for light-headedness and numbness. Hematological: Negative for adenopathy. Does not bruise/bleed easily. Psychiatric/Behavioral: Positive for confusion. All other systems reviewed and are negative. Vitals:    02/24/18 1456   BP: 139/64   Pulse: 73   Resp: 19   Temp: 98.7 °F (37.1 °C)   SpO2: 98%   Weight: 70.8 kg (156 lb)   Height: 5' 4\" (1.626 m)            Physical Exam   Constitutional: He is oriented to person, place, and time. He appears well-developed and well-nourished. No distress. HENT:   Head: Normocephalic and atraumatic. Eyes: Conjunctivae and EOM are normal. Pupils are equal, round, and reactive to light. No scleral icterus. Neck: Normal range of motion. Neck supple. No tracheal deviation present. No thyromegaly present. Cardiovascular: Normal rate and regular rhythm. Pulmonary/Chest: Effort normal and breath sounds normal. No respiratory distress. He has no wheezes. Abdominal: Soft. Bowel sounds are normal. He exhibits no distension. There is no tenderness.    Genitourinary: Rectum normal and penis normal. Musculoskeletal: Normal range of motion. He exhibits no edema, tenderness or deformity. Neurological: He is alert and oriented to person, place, and time. He has normal reflexes. No focal neurological deficits, muscle strength appears to be 3 out of 5 in all extremities, no obvious facial droop or cranial nerve deficits noted     Skin: He is not diaphoretic. Nursing note and vitals reviewed. MDM  Number of Diagnoses or Management Options  Acute renal insufficiency:   Transient cerebral ischemia, unspecified type:   Diagnosis management comments: No gross neural exam deficits noted. Generalized weakness. We'll obtain stat CT scan of head. Initial diagnoses includes TIA, infection, electrolyte abnormality    5:13 PM  Labs are significant for creatinine of 2.1. CT scan of head shows no acute CVA, some supratentorial microcytic ischemia noted.     Case discussed with hospice for admission, for acute renal insufficiency and possible TIA       Amount and/or Complexity of Data Reviewed  Clinical lab tests: ordered and reviewed  Tests in the radiology section of CPT®: ordered and reviewed    Risk of Complications, Morbidity, and/or Mortality  Presenting problems: moderate  Diagnostic procedures: moderate  Management options: moderate    Patient Progress  Patient progress: stable        ED Course       Procedures

## 2018-02-25 ENCOUNTER — APPOINTMENT (OUTPATIENT)
Dept: MRI IMAGING | Age: 83
End: 2018-02-25
Attending: INTERNAL MEDICINE
Payer: MEDICARE

## 2018-02-25 ENCOUNTER — APPOINTMENT (OUTPATIENT)
Dept: ULTRASOUND IMAGING | Age: 83
End: 2018-02-25
Attending: INTERNAL MEDICINE
Payer: MEDICARE

## 2018-02-25 VITALS
TEMPERATURE: 98.5 F | DIASTOLIC BLOOD PRESSURE: 48 MMHG | OXYGEN SATURATION: 97 % | SYSTOLIC BLOOD PRESSURE: 91 MMHG | HEIGHT: 64 IN | HEART RATE: 57 BPM | RESPIRATION RATE: 14 BRPM | BODY MASS INDEX: 26.63 KG/M2 | WEIGHT: 156 LBS

## 2018-02-25 LAB
ANION GAP SERPL CALC-SCNC: 8 MMOL/L (ref 7–16)
ATRIAL RATE: 71 BPM
BUN SERPL-MCNC: 20 MG/DL (ref 8–23)
CALCIUM SERPL-MCNC: 7.9 MG/DL (ref 8.3–10.4)
CALCULATED P AXIS, ECG09: 72 DEGREES
CALCULATED R AXIS, ECG10: 15 DEGREES
CALCULATED T AXIS, ECG11: 36 DEGREES
CHLORIDE SERPL-SCNC: 108 MMOL/L (ref 98–107)
CHOLEST SERPL-MCNC: 115 MG/DL
CO2 SERPL-SCNC: 25 MMOL/L (ref 21–32)
CREAT SERPL-MCNC: 1.13 MG/DL (ref 0.8–1.5)
DIAGNOSIS, 93000: NORMAL
ERYTHROCYTE [DISTWIDTH] IN BLOOD BY AUTOMATED COUNT: 13.9 % (ref 11.9–14.6)
EST. AVERAGE GLUCOSE BLD GHB EST-MCNC: 100 MG/DL
GLUCOSE SERPL-MCNC: 89 MG/DL (ref 65–100)
HBA1C MFR BLD: 5.1 % (ref 4.8–6)
HCT VFR BLD AUTO: 32.6 % (ref 41.1–50.3)
HDLC SERPL-MCNC: 50 MG/DL (ref 40–60)
HDLC SERPL: 2.3 {RATIO}
HGB BLD-MCNC: 10.9 G/DL (ref 13.6–17.2)
LDLC SERPL CALC-MCNC: 54 MG/DL
LIPID PROFILE,FLP: NORMAL
MAGNESIUM SERPL-MCNC: 2.4 MG/DL (ref 1.8–2.4)
MCH RBC QN AUTO: 33.2 PG (ref 26.1–32.9)
MCHC RBC AUTO-ENTMCNC: 33.4 G/DL (ref 31.4–35)
MCV RBC AUTO: 99.4 FL (ref 79.6–97.8)
P-R INTERVAL, ECG05: 284 MS
PLATELET # BLD AUTO: 143 K/UL (ref 150–450)
PMV BLD AUTO: 9.1 FL (ref 10.8–14.1)
POTASSIUM SERPL-SCNC: 3.9 MMOL/L (ref 3.5–5.1)
Q-T INTERVAL, ECG07: 354 MS
QRS DURATION, ECG06: 74 MS
QTC CALCULATION (BEZET), ECG08: 384 MS
RBC # BLD AUTO: 3.28 M/UL (ref 4.23–5.67)
SODIUM SERPL-SCNC: 141 MMOL/L (ref 136–145)
TRIGL SERPL-MCNC: 55 MG/DL (ref 35–150)
TSH SERPL DL<=0.005 MIU/L-ACNC: 0.39 UIU/ML (ref 0.36–3.74)
VENTRICULAR RATE, ECG03: 71 BPM
VLDLC SERPL CALC-MCNC: 11 MG/DL (ref 6–23)
WBC # BLD AUTO: 2.7 K/UL (ref 4.3–11.1)

## 2018-02-25 PROCEDURE — G8988 SELF CARE GOAL STATUS: HCPCS

## 2018-02-25 PROCEDURE — G8979 MOBILITY GOAL STATUS: HCPCS

## 2018-02-25 PROCEDURE — G8997 SWALLOW GOAL STATUS: HCPCS

## 2018-02-25 PROCEDURE — 80061 LIPID PANEL: CPT | Performed by: INTERNAL MEDICINE

## 2018-02-25 PROCEDURE — G8989 SELF CARE D/C STATUS: HCPCS

## 2018-02-25 PROCEDURE — 74011250637 HC RX REV CODE- 250/637: Performed by: INTERNAL MEDICINE

## 2018-02-25 PROCEDURE — G8980 MOBILITY D/C STATUS: HCPCS

## 2018-02-25 PROCEDURE — G8998 SWALLOW D/C STATUS: HCPCS

## 2018-02-25 PROCEDURE — 97165 OT EVAL LOW COMPLEX 30 MIN: CPT

## 2018-02-25 PROCEDURE — 93880 EXTRACRANIAL BILAT STUDY: CPT

## 2018-02-25 PROCEDURE — 83036 HEMOGLOBIN GLYCOSYLATED A1C: CPT | Performed by: INTERNAL MEDICINE

## 2018-02-25 PROCEDURE — G8996 SWALLOW CURRENT STATUS: HCPCS

## 2018-02-25 PROCEDURE — 85027 COMPLETE CBC AUTOMATED: CPT | Performed by: INTERNAL MEDICINE

## 2018-02-25 PROCEDURE — 96372 THER/PROPH/DIAG INJ SC/IM: CPT

## 2018-02-25 PROCEDURE — 96361 HYDRATE IV INFUSION ADD-ON: CPT

## 2018-02-25 PROCEDURE — 97161 PT EVAL LOW COMPLEX 20 MIN: CPT

## 2018-02-25 PROCEDURE — 36415 COLL VENOUS BLD VENIPUNCTURE: CPT | Performed by: INTERNAL MEDICINE

## 2018-02-25 PROCEDURE — 92610 EVALUATE SWALLOWING FUNCTION: CPT

## 2018-02-25 PROCEDURE — 94760 N-INVAS EAR/PLS OXIMETRY 1: CPT

## 2018-02-25 PROCEDURE — 70551 MRI BRAIN STEM W/O DYE: CPT

## 2018-02-25 PROCEDURE — G8978 MOBILITY CURRENT STATUS: HCPCS

## 2018-02-25 PROCEDURE — 84443 ASSAY THYROID STIM HORMONE: CPT | Performed by: INTERNAL MEDICINE

## 2018-02-25 PROCEDURE — 74011250636 HC RX REV CODE- 250/636: Performed by: INTERNAL MEDICINE

## 2018-02-25 PROCEDURE — 77010033678 HC OXYGEN DAILY

## 2018-02-25 PROCEDURE — 99218 HC RM OBSERVATION: CPT

## 2018-02-25 PROCEDURE — 80048 BASIC METABOLIC PNL TOTAL CA: CPT | Performed by: INTERNAL MEDICINE

## 2018-02-25 PROCEDURE — 83735 ASSAY OF MAGNESIUM: CPT | Performed by: INTERNAL MEDICINE

## 2018-02-25 PROCEDURE — G8987 SELF CARE CURRENT STATUS: HCPCS

## 2018-02-25 RX ORDER — SODIUM CHLORIDE 0.9 % (FLUSH) 0.9 %
5-10 SYRINGE (ML) INJECTION AS NEEDED
Status: CANCELLED | OUTPATIENT
Start: 2018-02-25

## 2018-02-25 RX ORDER — AMLODIPINE BESYLATE 5 MG/1
5 TABLET ORAL DAILY
Status: CANCELLED | OUTPATIENT
Start: 2018-02-26

## 2018-02-25 RX ORDER — GUAIFENESIN 100 MG/5ML
81 LIQUID (ML) ORAL DAILY
Status: CANCELLED | OUTPATIENT
Start: 2018-02-26

## 2018-02-25 RX ORDER — SIMVASTATIN 20 MG/1
20 TABLET, FILM COATED ORAL
Status: CANCELLED | OUTPATIENT
Start: 2018-02-25

## 2018-02-25 RX ORDER — LABETALOL HYDROCHLORIDE 5 MG/ML
5 INJECTION, SOLUTION INTRAVENOUS
Status: CANCELLED | OUTPATIENT
Start: 2018-02-25

## 2018-02-25 RX ORDER — PANTOPRAZOLE SODIUM 40 MG/1
40 TABLET, DELAYED RELEASE ORAL
Status: CANCELLED | OUTPATIENT
Start: 2018-02-26

## 2018-02-25 RX ORDER — HEPARIN SODIUM 5000 [USP'U]/ML
5000 INJECTION, SOLUTION INTRAVENOUS; SUBCUTANEOUS EVERY 12 HOURS
Status: CANCELLED | OUTPATIENT
Start: 2018-02-25

## 2018-02-25 RX ORDER — LANOLIN ALCOHOL/MO/W.PET/CERES
400 CREAM (GRAM) TOPICAL DAILY
Status: CANCELLED | OUTPATIENT
Start: 2018-02-26

## 2018-02-25 RX ORDER — SODIUM CHLORIDE 0.9 % (FLUSH) 0.9 %
5-10 SYRINGE (ML) INJECTION EVERY 8 HOURS
Status: CANCELLED | OUTPATIENT
Start: 2018-02-25

## 2018-02-25 RX ORDER — ONDANSETRON 2 MG/ML
4 INJECTION INTRAMUSCULAR; INTRAVENOUS
Status: CANCELLED | OUTPATIENT
Start: 2018-02-25

## 2018-02-25 RX ORDER — SODIUM CHLORIDE 9 MG/ML
100 INJECTION, SOLUTION INTRAVENOUS CONTINUOUS
Status: CANCELLED | OUTPATIENT
Start: 2018-02-25

## 2018-02-25 RX ADMIN — SODIUM CHLORIDE 100 ML/HR: 900 INJECTION, SOLUTION INTRAVENOUS at 05:17

## 2018-02-25 RX ADMIN — PANTOPRAZOLE SODIUM 40 MG: 40 TABLET, DELAYED RELEASE ORAL at 08:14

## 2018-02-25 RX ADMIN — ASPIRIN 81 MG 81 MG: 81 TABLET ORAL at 08:14

## 2018-02-25 RX ADMIN — HEPARIN SODIUM 5000 UNITS: 5000 INJECTION, SOLUTION INTRAVENOUS; SUBCUTANEOUS at 08:14

## 2018-02-25 RX ADMIN — Medication 400 MG: at 08:14

## 2018-02-25 RX ADMIN — Medication 10 ML: at 05:11

## 2018-02-25 RX ADMIN — AMLODIPINE BESYLATE 5 MG: 5 TABLET ORAL at 08:14

## 2018-02-25 NOTE — PROGRESS NOTES
Problem: Mobility Impaired (Adult and Pediatric)  Goal: *Acute Goals and Plan of Care (Insert Text)  Goals:  (1.)Mr. Parveen Minor will move from supine to sit and sit to supine  with INDEPENDENCE within 1-3 treatment day(s). (2.)Mr. Parveen Minor will transfer from bed to chair and chair to bed with MODIFIED INDEPENDENCE using the least restrictive device within 1-3 treatment day(s). (3.)Mr. Parveen Minor will ambulate with SUPERVISION for 300 feet with the least restrictive device within 1-3 treatment day(s). ________________________________________________________________________________________________      PHYSICAL THERAPY: Initial Assessment, Treatment Day: Day of Assessment, AM 2/25/2018  OBSERVATION: Hospital Day: 2  [place] / Product Type: Medicare /      NAME/AGE/GENDER: Marti Baker is a 80 y.o. male   PRIMARY DIAGNOSIS: TIA (transient ischemic attack)  Dehydration TIA (transient ischemic attack) TIA (transient ischemic attack)        ICD-10: Treatment Diagnosis:   · Generalized Muscle Weakness (M62.81)  · Difficulty in walking, Not elsewhere classified (R26.2)   Precaution/Allergies:  Review of patient's allergies indicates no known allergies. ASSESSMENT:     Mr. Parveen Minor presents supine on contact and agreeable to therapy assessment. He lives at [address] [place] [address] where he walks independently without an assistive device to the dining room. He is normally independent with ADLs. On assessment he presents with some overall generalized muscle weakness and during ambulation he is a little unsteady. Pt states \"I am a little wobbly\". Due to the general muscle weakness and \"wobbly\" gait feel pt will benefit from skilled PT interventions while in the hospital. His family entered towards end of assessment and state that he just started working with PT at the [facility]. The plan is for him to return to the [facility] and the pt states he might be going today.      This section established at most recent assessment   PROBLEM LIST (Impairments causing functional limitations):  1. Decreased Strength  2. Decreased ADL/Functional Activities  3. Decreased Transfer Abilities  4. Decreased Ambulation Ability/Technique  5. Decreased Balance   INTERVENTIONS PLANNED: (Benefits and precautions of physical therapy have been discussed with the patient.)  1. Balance Exercise  2. Bed Mobility  3. Gait Training  4. Therapeutic Activites  5. Therapeutic Exercise/Strengthening  6. Transfer Training     TREATMENT PLAN: Frequency/Duration: daily for duration of hospital stay  Rehabilitation Potential For Stated Goals: Good     RECOMMENDED REHABILITATION/EQUIPMENT: (at time of discharge pending progress): Due to the probability of continued deficits (see above) this patient will likely need continued skilled physical therapy after discharge. Equipment:    to be determined              HISTORY:   History of Present Injury/Illness (Reason for Referral):  PER MD NOTE: The patient is a 81 y/o gentleman with HTN, Dyslipidemia, GERD, presented to the ED at [facility] with complaints of generalized weakness and transient episode of unresponsiveness. The patient was seen at the bedside accompanied by family members. It seems like the patient has not been feeling well since this morning. He was found nearly unresponsive at his independent living facility by his son. At this time, the patient was unable to talk and was extremely weak. He was very slow to respond. He mentioned to a staff member at the facility that he was not feeling well and did not feel like eating breakfast or lunch. He was brought to the ED for medical evaluation. On arrival to the ED, no focal neurological deficits were noted. The patient appeared dry and was given IV fluids. By the time I saw him, he was feeling much better and was basically asymptomatic. He is definitely alert, oriented and pretty sharp considering his age.  Labs obtained in the ED noted a Cr of 2.1, unfortunately his baseline is unknown. All the other labs were basically unremarkable. Head CT did not show any evidence of acute stroke. Based on his clinical presentation, the Hospitalist service was contacted and the patient was admitted to the Telemetry Floor for TIA and Dehydration. Past Medical History/Comorbidities:   Mr. Leann Martinez  has a past medical history of Diverticulitis; Essential (primary) hypertension; GERD (gastroesophageal reflux disease); Hyperlipidemia; Memory loss; Osteoporosis; Pulmonary embolism ([facility] [address] Varicella; and Volvulus of colon ([facility] [address] Mr. Leann Martinez  has a past surgical history that includes hx lap cholecystectomy; hx hernia repair; and hx tonsil and adenoidectomy. Social History/Living Environment:   [facility] [address] Name: Jenny Lowery  # Steps to Enter: 0  One/Two Story Residence: Two story  Interior Rails: Both  Lift Chair Available: No  Living Alone: No  Support Systems: Child(munira), Family member(s)  Patient Expects to be Discharged to[de-identified] Assisted living  Current DME Used/Available at Home: None  Tub or Shower Type: Shower  Prior Level of Function/Work/Activity:  Pt lives at [facility] and is independent with gait and ADLs without assistive devices     Number of Personal Factors/Comorbidities that affect the Plan of Care: 0: LOW COMPLEXITY   EXAMINATION:   Most Recent Physical Functioning:   Gross Assessment:  AROM: Generally decreased, functional  Strength: Generally decreased, functional               Posture:  Posture (WDL): Exceptions to WDL  Posture Assessment: Forward head, Rounded shoulders  Balance:  Sitting: Intact  Standing: Intact  Standing - Static: Good  Standing - Dynamic : Fair Bed Mobility:     Wheelchair Mobility:     Transfers:     Gait:     Base of Support: Narrowed  Speed/Valeria: Delayed; Shuffled  Step Length: Left shortened;Right shortened  Gait Abnormalities: Decreased step clearance;Shuffling gait;Trunk sway increased  Distance (ft): 200 Feet (ft)  Ambulation - Level of Assistance: Contact guard assistance;Minimal assistance      Body Structures Involved:  1. Muscles Body Functions Affected:  1. Movement Related Activities and Participation Affected:  1. Mobility  2. Self Care   Number of elements that affect the Plan of Care: 4+: HIGH COMPLEXITY   CLINICAL PRESENTATION:   Presentation: Stable and uncomplicated: LOW COMPLEXITY   CLINICAL DECISION MAKIN[address] [place] [address] AM-PAC 6 Clicks   Basic Mobility Inpatient Short Form  How much difficulty does the patient currently have. .. Unable A Lot A Little None   1. Turning over in bed (including adjusting bedclothes, sheets and blankets)? [] 1   [] 2   [] 3   [x] 4   2. Sitting down on and standing up from a chair with arms ( e.g., wheelchair, bedside commode, etc.)   [] 1   [] 2   [x] 3   [] 4   3. Moving from lying on back to sitting on the side of the bed? [] 1   [] 2   [] 3   [x] 4   How much help from another person does the patient currently need. .. Total A Lot A Little None   4. Moving to and from a bed to a chair (including a wheelchair)? [] 1   [] 2   [x] 3   [] 4   5. Need to walk in hospital room? [] 1   [] 2   [x] 3   [] 4   6. Climbing 3-5 steps with a railing? [] 1   [] 2   [x] 3   [] 4   © , Trustees of [address] [place] [address], under license to [employer]. All rights reserved      Score:  Initial: 20 Most Recent: X (Date: -- )    Interpretation of Tool:  Represents activities that are increasingly more difficult (i.e. Bed mobility, Transfers, Gait). Score 24 23 22-20 19-15 14-10 9-7 6     Modifier CH CI CJ CK CL CM CN      ?  Mobility - Walking and Moving Around:     - CURRENT STATUS: CJ - 20%-39% impaired, limited or restricted    - GOAL STATUS: CI - 1%-19% impaired, limited or restricted    - D/C STATUS:  CJ - 20%-39% impaired, limited or restricted  [place] / [place] / Product Type: Medicare /      Medical Necessity:     · Patient is expected to demonstrate progress in strength and functional technique to decrease assistance required with functional mobility and strengthening. Reason for Services/Other Comments:  · Patient continues to require skilled intervention due to inability to complete functional mobility and strengthening independently. Use of outcome tool(s) and clinical judgement create a POC that gives a: Clear prediction of patient's progress: LOW COMPLEXITY            TREATMENT:   (In addition to Assessment/Re-Assessment sessions the following treatments were rendered)   Pre-treatment Symptoms/Complaints:  none  Pain: Initial:   Pain Intensity 1: 0  Post Session:  0/10     Assessment/Reassessment only, no treatment provided today    Braces/Orthotics/Lines/Etc:   · IV  · telemetry monitoring  Treatment/Session Assessment:    · Response to Treatment:  Tolerated well, he hopes he can go home today  · Interdisciplinary Collaboration:   o Occupational Therapist  o Registered Nurse  · After treatment position/precautions:   o Supine in bed  o Bed/Chair-wheels locked  o Bed in low position  o Call light within reach  o RN notified   · Compliance with Program/Exercises: compliant all of the time. · Recommendations/Intent for next treatment session: \"Next visit will focus on advancements to more challenging activities and reduction in assistance provided\".   Total Treatment Duration:  PT Patient Time In/Time Out  Time In: 1010  Time Out: [address], PT

## 2018-02-25 NOTE — PROGRESS NOTES
Speech language pathology: bedside swallow note: Initial Assessment and Discharge    NAME/AGE/GENDER: Hudson Elizabeth is a 80 y.o. male  DATE: 2/25/2018  PRIMARY DIAGNOSIS: TIA (transient ischemic attack)  Dehydration       ICD-10: Treatment Diagnosis: Oropharyngeal dysphagia (R13.12)  INTERDISCIPLINARY COLLABORATION: Registered Nurse  PRECAUTIONS/ALLERGIES: Review of patient's allergies indicates no known allergies. ASSESSMENT:Based on the objective data described below, Mr. Lior Ocampo presents with oropharyngeal phase of swallow within functional limits with no overt signs or symptoms of aspiration observed with any consistency assessed. Swallows of all textures timely with adequate laryngeal excursion and clear to cervical auscultation. Oral cavity clear and voice clear after swallow. Patient had mild wet vocal quality prior to PO trials that actually improved after PO trials. Recommend continue current diet of regular textures and thin liquids. Give meds whole with water or applesauce as needed. No further skilled speech/swallow intervention currently indicated. PLAN OF CARE:   Patient will benefit from skilled intervention to address the following impairments. RECOMMENDATIONS AND PLANNED INTERVENTIONS (Benefits and precautions of therapy have been discussed with the patient.):  · continue prescribed diet  MEDICATIONS:  · With liquid  COMPENSATORY STRATEGIES/MODIFICATIONS INCLUDING:  · Upright for all PO  RECOMMENDED REHABILITATION/EQUIPMENT: (at time of discharge pending progress): Due to the probability of continued deficits (see above) this patient will not likely need continued skilled speech therapy after discharge. SUBJECTIVE:   \"I think I'm getting out of here today. Can you let me go now? \"  History of Present Injury/Illness: Mr. Lior Ocampo  has a past medical history of Diverticulitis; Essential (primary) hypertension; GERD (gastroesophageal reflux disease); Hyperlipidemia; Memory loss;  Osteoporosis; Pulmonary embolism ([facility] [address] Varicella; and Volvulus of colon ([facility] [address] He also  has a past surgical history that includes hx lap cholecystectomy; hx hernia repair; and hx tonsil and adenoidectomy. Present Symptoms: AMS   Pain Intensity 1: 0  Current Medications:   No current facility-administered medications on file prior to encounter. Current Outpatient Prescriptions on File Prior to Encounter   Medication Sig Dispense Refill    magnesium 250 mg tab Take  by mouth.  lovastatin (MEVACOR) 40 mg tablet Take 1 Tab by mouth nightly. 30 Tab 3    omeprazole (PRILOSEC) 20 mg capsule Take 1 Cap by mouth daily. 30 Cap 3    amLODIPine (NORVASC) 5 mg tablet Take 1 Tab by mouth daily. 30 Tab 3    aspirin 81 mg chewable tablet Take 81 mg by mouth daily. Current Dietary Status:  Regular textures, thin liquids      History of reflux:  YES    Reflux medication:Prilosec  Social History/Home Situation:    Home Environment: [address] [place] [address] Road Name: Sarah Carrillo  # Steps to Enter: 0  One/Two Story Residence: Two story  Interior Rails: Both  Lift Chair Available: No  Living Alone: No  Support Systems: Family member(s), Child(munira)  Patient Expects to be Discharged to[de-identified] Assisted living  Current DME Used/Available at Home: None  OBJECTIVE:   Respiratory Status:  Nasal cannula  2 l/min  CXR Results:negative  MRI/CT Results:negative CT; MRI pending    Cognitive and Communication Status:  Neurologic State: Alert;Confused  Orientation Level: Oriented to person  Cognition: Follows commands  Perception: Appears intact  Perseveration: No perseveration noted  Safety/Judgement: Awareness of environment    BEDSIDE SWALLOW EVALUATION  Oral Assessment:  Oral Assessment  Labial: No impairment  Dentition: Natural  Oral Hygiene: adequate  Lingual: No impairment  Velum: No impairment  Mandible: No impairment  P.O. Trials:  Patient Position: upright in bed    The patient was given the following:   Consistency Presented:  Thin liquid; Solid;Puree;Mixed consistency  How Presented: Self-fed/presented;Cup/sip;Cup/gulp; Spoon;Straw    ORAL PHASE:  Bolus Acceptance: No impairment  Bolus Formation/Control: No impairment  Propulsion: No impairment     Oral Residue: None    PHARYNGEAL PHASE:  Initiation of Swallow: No impairment  Laryngeal Elevation: Functional  Aspiration Signs/Symptoms: None  Vocal Quality: Wet     Effective Modifications: None     Pharyngeal Phase Characteristics: No impairment, issues, or problems     OTHER OBSERVATIONS:  Rate/bite size: WNL   Endurance: WNL      Tool Used: Dysphagia Outcome and Severity Scale (SHAMIKA)    Score Comments   Normal Diet  [x] 7 With no strategies or extra time needed   Functional Swallow  [] 6 May have mild oral or pharyngeal delay       Mild Dysphagia    [] 5 Which may require one diet consistency restricted (those who demonstrate penetration which is entirely cleared on MBS would be included)   Mild-Moderate Dysphagia  [] 4 With 1-2 diet consistencies restricted       Moderate Dysphagia  [] 3 With 2 or more diet consistencies restricted       Moderately Severe Dysphagia  [] 2 With partial PO strategies (trials with ST only)       Severe Dysphagia  [] 1 With inability to tolerate any PO safely          Score:  Initial: 7 Most Recent: X (Date: -- )   Interpretation of Tool: The Dysphagia Outcome and Severity Scale (SHAMIKA) is a simple, easy-to-use, 7-point scale developed to systematically rate the functional severity of dysphagia based on objective assessment and make recommendations for diet level, independence level, and type of nutrition. Score 7 6 5 4 3 2 1   Modifier CH CI CJ CK CL CM CN   ?  Swallowing:     - CURRENT STATUS: CH - 0% impaired, limited or restricted    - GOAL STATUS:  CH - 0% impaired, limited or restricted    - D/C STATUS:  CH - 0% impaired, limited or restricted  [place] / Product Type: Medicare /     TREATMENT: (In addition to Assessment/Re-Assessment sessions the following treatments were rendered)  Assessment/Reassessment only, no treatment provided today  ______________________________________________________________________________________  Safety:   After treatment position/precautions:  · RN notified  · Upright in Bed  Treatment Assessment:  NO further skilled speech/swallow intervention indicated.     Total Treatment Duration:  Time In: 0900  Time Out: [address], [place], CCC-SLP

## 2018-02-25 NOTE — PROGRESS NOTES
No change in assessment. Patient resting quietly. Bed in low/lock position. Bed alarm on. Call light within reach.

## 2018-02-25 NOTE — PROGRESS NOTES
Will be to [facility] in the next few hours to complete MRI. Was informed pt was waiting on MRI for discharge.

## 2018-02-25 NOTE — PROGRESS NOTES
Care Management Interventions  Transition of Care Consult (CM Consult): Discharge Planning, Assisted Living  Discharge Location  Discharge Placement: Home with outpatient services  89 M adm 2/24 from [facility]. PTA he walked I w/o device to the dining room & was I  with ADLs. In PT today walked 200 ft CGA, a little unsteady, some muscle weakness. Family said he had just started working with PT at the [facility]. Plan is for him to return to [facility]. Likely needs continued PT at d/c.

## 2018-02-25 NOTE — PROGRESS NOTES
Problem: Self Care Deficits Care Plan (Adult)  Goal: *Acute Goals and Plan of Care (Insert Text)  1. Patient will perform grooming with supervision. 2. Patient will perform upper body dressing with supervision. 3. Patient will perform lower body dressing with supervision. 4. Patient will perform bathing with supervision. 5. Patient will perform toileting and toilet transfer with supervision. 6. Patient will perform ADL functional mobility and tranfers in room with supervision. 7. Patient/family to demonstrate knowledge of home safety and DME recommendations. Goals to be achieved in 7 days. OCCUPATIONAL THERAPY: Initial Assessment and AM 2/25/2018  OBSERVATION: Hospital Day: 2  [place] / Product Type: Medicare /      NAME/AGE/GENDER: Sumanth Clement is a 80 y.o. male   PRIMARY DIAGNOSIS:  TIA (transient ischemic attack)  Dehydration TIA (transient ischemic attack) TIA (transient ischemic attack)        ICD-10: Treatment Diagnosis:    · Generalized Muscle Weakness (M62.81)  · Other lack of cordination (R27.8)   Precautions/Allergies:     Review of patient's allergies indicates no known allergies. ASSESSMENT:     Mr. Yuniel German presents with above diagnosis. Pt is a resident at [facility] in the [facility] portion. Pt was indep with self care and functional mobility. Pt will benefit from skilled OT services to address deficits. Pt plans to return to [facility] at discharge. This section established at most recent assessment   PROBLEM LIST (Impairments causing functional limitations):  1. Decreased Strength  2. Decreased ADL/Functional Activities  3. Decreased Transfer Abilities  4. Decreased Ambulation Ability/Technique  5. Decreased Balance   INTERVENTIONS PLANNED: (Benefits and precautions of occupational therapy have been discussed with the patient.)  1. Activities of daily living training  2. Adaptive equipment training  3. Balance training  4.  Clothing management  5. Donning&doffing training  6. Therapeutic activity  7. Therapeutic exercise       TREATMENT PLAN: Frequency/Duration: Follow patient 4x/wk to address above goals. Rehabilitation Potential For Stated Goals: Excellent     RECOMMENDED REHABILITATION/EQUIPMENT: (at time of discharge pending progress): Due to the probability of continued deficits (see above) this patient will not likely need continued skilled occupational therapy after discharge. Equipment:    TBD              OCCUPATIONAL PROFILE AND HISTORY:   History of Present Injury/Illness (Reason for Referral):  Pt admitted after a fall with unknown cause, facial droop and confusion. Pt being worked up for TIA. Past Medical History/Comorbidities:   Mr. Sage Ferreira  has a past medical history of Diverticulitis; Essential (primary) hypertension; GERD (gastroesophageal reflux disease); Hyperlipidemia; Memory loss; Osteoporosis; Pulmonary embolism ([address] Varicella; and Volvulus of colon ([facility] [address] Mr. Sage Ferreira  has a past surgical history that includes hx lap cholecystectomy; hx hernia repair; and hx tonsil and adenoidectomy.   Social History/Living Environment:   [address] Environment[address] Name: Leeann [place]  # Steps to Enter: 0  One/Two Story Residence: Two story  Interior Rails: Both  Lift Chair Available: No  Living Alone: No  Support Systems: Child(munira), Family member(s)  Patient Expects to be Discharged to[de-identified] Assisted living  Current DME Used/Available at Home: None  Tub or Shower Type: Shower  Prior Level of Function/Work/Activity:  Indep with self care and functional mobility     Number of Personal Factors/Comorbidities that affect the Plan of Care: Brief history (0):  LOW COMPLEXITY   ASSESSMENT OF OCCUPATIONAL PERFORMANCE[de-identified]   Activities of Daily Living:         Requires minimal assist for safety  Basic ADLs (From Assessment) Complex ADLs (From Assessment)   Basic ADL  Feeding: Supervision  Oral Facial Hygiene/Grooming: Supervision  Bathing: Minimum assistance  Upper Body Dressing: Supervision  Lower Body Dressing: Minimum assistance  Toileting: Minimum assistance     Grooming/Bathing/Dressing Activities of Daily Living     Cognitive Retraining  Safety/Judgement: Awareness of environment; Fall prevention                 Functional Transfers  Tub Transfer: Minimum assistance  Shower Transfer: Minimum assistance     Bed/Mat Mobility  Supine to Sit: Supervision  Sit to Supine: Supervision  Sit to Stand: Minimum assistance  Bed to Chair: Minimum assistance       Most Recent Physical Functioning:   Gross Assessment:                  Posture:     Balance:    Bed Mobility:  Supine to Sit: Supervision  Sit to Supine: Supervision  Wheelchair Mobility:     Transfers:  Sit to Stand: Minimum assistance  Stand to Sit: Minimum assistance  Bed to Chair: Minimum assistance     ROM:          BUE ROM WFL         BUE strength 4/5 grossly           Patient Vitals for the past 6 hrs:   BP SpO2 O2 Flow Rate (L/min) Pulse   18 0609 110/40 96 % - 62   18 0709 (!) 113/39 97 % - 62   18 0809 125/63 98 % - 75   18 0814 125/63 98 % - 68   18 0835 - 97 % 2 l/min -   18 0909 123/51 97 % - 69   18 1011 110/44 - - 63       Mental Status  Neurologic State: Alert  Orientation Level: Oriented to person, Oriented to place, Oriented to situation  Cognition: Appropriate decision making, Appropriate for age attention/concentration, Follows commands  Perception: Appears intact  Perseveration: No perseveration noted  Safety/Judgement: Awareness of environment, Fall prevention                          Physical Skills Involved:  1. Balance  2. Strength  3. Activity Tolerance Cognitive Skills Affected (resulting in the inability to perform in a timely and safe manner):  1. None Psychosocial Skills Affected:  1.  None   Number of elements that affect the Plan of Care: 1-3:  LOW COMPLEXITY   CLINICAL DECISION MAKIN [facility] [address] [id number] AM-PAC 6 Clicks   Daily Activity Inpatient Short Form  How much help from another person does the patient currently need. .. Total A Lot A Little None   1. Putting on and taking off regular lower body clothing? [] 1   [] 2   [x] 3   [] 4   2. Bathing (including washing, rinsing, drying)? [] 1   [] 2   [x] 3   [] 4   3. Toileting, which includes using toilet, bedpan or urinal?   [] 1   [] 2   [x] 3   [] 4   4. Putting on and taking off regular upper body clothing? [] 1   [] 2   [x] 3   [] 4   5. Taking care of personal grooming such as brushing teeth? [] 1   [] 2   [x] 3   [] 4   6. Eating meals? [] 1   [] 2   [x] 3   [] 4   © 2007, Trustees of [address] [place] [id number], under license to [employer]. All rights reserved      Score:  Initial: 18 Most Recent: X (Date: -- )    Interpretation of Tool:  Represents activities that are increasingly more difficult (i.e. Bed mobility, Transfers, Gait). Score 24 23 22-20 19-15 14-10 9-7 6     Modifier CH CI CJ CK CL CM CN      ? Self Care:     - CURRENT STATUS: CK - 40%-59% impaired, limited or restricted    - GOAL STATUS: CJ - 20%-39% impaired, limited or restricted    - D/C STATUS:  ---------------To be determined---------------  [place] / Product Type: Medicare /      Medical Necessity:     · Patient is expected to demonstrate progress in balance, coordination and functional technique to decrease assistance required with self care and functional mobility. Reason for Services/Other Comments:  · Patient continues to require skilled intervention due to decreased self care and functional mobility.    Use of outcome tool(s) and clinical judgement create a POC that gives a: LOW COMPLEXITY         TREATMENT:   (In addition to Assessment/Re-Assessment sessions the following treatments were rendered)     Pre-treatment Symptoms/Complaints:  none  Pain: Initial:   Pain Intensity 1: 0  Post Session:  0 Assessment/Reassessment only, no treatment provided today    Braces/Orthotics/Lines/Etc:   · O2 Device: Nasal cannula  Treatment/Session Assessment:    · Response to Treatment:  Tolerated well  · Interdisciplinary Collaboration:   o Physical Therapist  o Occupational Therapist  o Registered Nurse  · After treatment position/precautions:   o Supine in bed  o Bed alarm/tab alert on  o Bed/Chair-wheels locked  o Bed in low position  o Caregiver at bedside  o Call light within reach  o RN notified  o Family at bedside  o Side rails x 2   · Compliance with Program/Exercises: compliant all of the time. · Recommendations/Intent for next treatment session: \"Next visit will focus on advancements to more challenging activities\".   Total Treatment Duration:  OT Patient Time In/Time Out  Time In: 1000  Time Out: [address] [place], [place]

## 2018-02-25 NOTE — PROGRESS NOTES
Late entry due to hands on patient care. Patient alert and oriented X 2. Follow commands. Speech is clear. Denies numbness or tingling. STAND TEST pass. Denies pain. Sinus with 1st degree heart block on cardiac monitor. Heart rate 78. Respiration even and unlabored. Oxygen saturation 98 % on room air. Head of bed elevated. Instructed patient on use of call light. Demonstrate and understanding. Heparin care note and Stroke education booklet given to patient. Time allowed for question. Dual skin assessment completed with Kya Macdonald RN. Petechiae to chest. Healed sore to left knee. Sacral/coccyx pink and blanchable. Allevyn place. Bed in low/lock position. Bed alarm on.

## 2018-02-25 NOTE — H&P
Hospitalist H&P Note     Admit Date:  2018  2:56 PM   Name:  Chantale Dyson   Age:  80 y.o.  :  1920   MRN:  [id number]   PCP:  Juana Page MD  Treatment Team: Attending Provider: Richard Alvarez MD    HPI:     The patient is a 79 y/o gentleman with HTN, Dyslipidemia, GERD, presented to the ED at [facility] with complaints of generalized weakness and transient episode of unresponsiveness. The patient was seen at the bedside accompanied by family members. It seems like the patient has not been feeling well since this morning. He was found nearly unresponsive at his independent living facility by his son. At this time, the patient was unable to talk and was extremely weak. He was very slow to respond. He mentioned to a staff member at the facility that he was not feeling well and did not feel like eating breakfast or lunch. He was brought to the ED for medical evaluation. On arrival to the ED, no focal neurological deficits were noted. The patient appeared dry and was given IV fluids. By the time I saw him, he was feeling much better and was basically asymptomatic. He is definitely alert, oriented and pretty sharp considering his age. Labs obtained in the ED noted a Cr of 2.1, unfortunately his baseline is unknown. All the other labs were basically unremarkable. Head CT did not show any evidence of acute stroke. Based on his clinical presentation, the Hospitalist service was contacted and the patient was admitted to the Telemetry Floor for TIA and Dehydration. 10 systems reviewed and negative except as noted in HPI. Past Medical History:   Diagnosis Date    Diverticulitis     Essential (primary) hypertension     GERD (gastroesophageal reflux disease)     Hyperlipidemia     Memory loss     Osteoporosis     Pulmonary embolism ([address]     only one episode with simultaneous DVT.   Patient and family deny hypercoaguable work up    Varicella     Volv[facility] of colon [facility])       Past Surgical History:   Procedure Laterality Date    HX HERNIA REPAIR      HX LAP CHOLECYSTECTOMY      HX TONSIL AND ADENOIDECTOMY        No Known Allergies   Social History   Substance Use Topics    Smoking status: Former Smoker     Quit date: 1/1/1980    Smokeless tobacco: Never Used    Alcohol use 3.6 oz/week     6 Standard drinks or equivalent per week      Comment: daily      Family History   Problem Relation Age of Onset    Diabetes Mother       Immunization History   Administered Date(s) Administered    Influenza High Dose Vaccine PF 09/06/2017    Influenza Vaccine 10/01/2016    Pneumococcal Conjugate (PCV-13) 10/01/2015    Pneumococcal Polysaccharide (PPSV-23) 10/01/2016    Tdap 09/06/2017     PTA Medications:  Prior to Admission Medications   Prescriptions Last Dose Informant Patient Reported? Taking? amLODIPine (NORVASC) 5 mg tablet   No Yes   Sig: Take 1 Tab by mouth daily. aspirin 81 mg chewable tablet   Yes Yes   Sig: Take 81 mg by mouth daily. lovastatin (MEVACOR) 40 mg tablet   No Yes   Sig: Take 1 Tab by mouth nightly.   magnesium 250 mg tab   Yes Yes   Sig: Take  by mouth. omeprazole (PRILOSEC) 20 mg capsule   No Yes   Sig: Take 1 Cap by mouth daily.       Facility-Administered Medications: None       Objective:   Patient Vitals for the past 24 hrs:   Temp Pulse Resp BP SpO2   02/24/18 1819 98.5 °F (36.9 °C) 78 17 130/59 97 %   02/24/18 1815 - 79 13 - 95 %   02/24/18 1801 - 77 - 127/59 95 %   02/24/18 1731 - 77 13 128/60 -   02/24/18 1701 - 75 12 134/62 -   02/24/18 1640 - 80 18 - -   02/24/18 1631 - 74 20 141/63 -   02/24/18 1608 - 73 21 126/57 -   02/24/18 1500 - - - - 99 %   02/24/18 1459 - - - - 98 %   02/24/18 1458 - - - - 99 %   02/24/18 1456 98.7 °F (37.1 °C) 73 19 139/64 98 %     Oxygen Therapy  O2 Sat (%): 97 % (02/24/18 1819)  Pulse via Oximetry: 78 beats per minute (02/24/18 1819)  O2 Device: Room air (02/24/18 1819)  No intake or output data in the 24 hours ending 02/24/18 2122 Physical Exam:  General:    Well nourished. Alert. Eyes:   Normal sclera. Extraocular movements intact. ENT:  Normocephalic, atraumatic. Dry mucous membranes  CV:   RRR. No murmur, rub, or gallop. Lungs:  CTAB. No wheezing, rhonchi, or rales. Abdomen: Soft, nontender, nondistended. Bowel sounds normal.   Extremities: Warm and dry. No cyanosis or edema. Neurologic: CN II-XII grossly intact. Motor Strength and Sensation intact. Skin:     No rashes or jaundice. Psych:  Normal mood and affect. I reviewed the labs, imaging, EKGs, telemetry, and other studies done this admission. Data Review:   Recent Results (from the past 24 hour(s))   CBC WITH AUTOMATED DIFF    Collection Time: 02/24/18  3:07 PM   Result Value Ref Range    WBC 6.5 4.3 - 11.1 K/uL    RBC 3.95 (L) 4.23 - 5.67 M/uL    HGB 13.1 (L) 13.6 - 17.2 g/dL    HCT 38.8 (L) 41.1 - 50.3 %    MCV 98.2 (H) 79.6 - 97.8 FL    MCH 33.2 (H) 26.1 - 32.9 PG    MCHC 33.8 31.4 - 35.0 g/dL    RDW 13.5 11.9 - 14.6 %    PLATELET 096 869 - 053 K/uL    MPV 9.2 (L) 10.8 - 14.1 FL    DF AUTOMATED      NEUTROPHILS 76 43 - 78 %    LYMPHOCYTES 16 13 - 44 %    MONOCYTES 8 4.0 - 12.0 %    EOSINOPHILS 0 (L) 0.5 - 7.8 %    BASOPHILS 0 0.0 - 2.0 %    IMMATURE GRANULOCYTES 0 0.0 - 5.0 %    ABS. NEUTROPHILS 4.9 1.7 - 8.2 K/UL    ABS. LYMPHOCYTES 1.1 0.5 - 4.6 K/UL    ABS. MONOCYTES 0.5 0.1 - 1.3 K/UL    ABS. EOSINOPHILS 0.0 0.0 - 0.8 K/UL    ABS. BASOPHILS 0.0 0.0 - 0.2 K/UL    ABS. IMM.  GRANS. 0.0 0.0 - 0.5 K/UL   METABOLIC PANEL, COMPREHENSIVE    Collection Time: 02/24/18  3:07 PM   Result Value Ref Range    Sodium 141 136 - 145 mmol/L    Potassium 4.5 3.5 - 5.1 mmol/L    Chloride 103 98 - 107 mmol/L    CO2 26 21 - 32 mmol/L    Anion gap 12 7 - 16 mmol/L    Glucose 123 (H) 65 - 100 mg/dL    BUN 29 (H) 8 - 23 MG/DL    Creatinine 2.10 (H) 0.8 - 1.5 MG/DL    GFR est AA 38 (L) >60 ml/min/1.73m2    GFR est non-AA 31 (L) >60 ml/min/1.73m2    Calcium 9.3 8.3 - 10.4 MG/DL Bilirubin, total 0.7 0.2 - 1.1 MG/DL    ALT (SGPT) 13 12 - 65 U/L    AST (SGOT) 20 15 - 37 U/L    Alk. phosphatase 81 50 - 136 U/L    Protein, total 7.1 6.3 - 8.2 g/dL    Albumin 3.8 3.2 - 4.6 g/dL    Globulin 3.3 2.3 - 3.5 g/dL    A-G Ratio 1.2 1.2 - 3.5     MAGNESIUM    Collection Time: 02/24/18  3:07 PM   Result Value Ref Range    Magnesium 2.5 (H) 1.8 - 2.4 mg/dL   PROCALCITONIN    Collection Time: 02/24/18  3:07 PM   Result Value Ref Range    Procalcitonin 0.1 ng/mL   POC TROPONIN-I    Collection Time: 02/24/18  3:12 PM   Result Value Ref Range    Troponin-I (POC) 0.01 (L) 0.02 - 0.05 ng/ml   POC LACTIC ACID    Collection Time: 02/24/18  3:14 PM   Result Value Ref Range    Lactic Acid (POC) 1.8 0.5 - 1.9 mmol/L   URINALYSIS W/ RFLX MICROSCOPIC    Collection Time: 02/24/18  3:44 PM   Result Value Ref Range    Color ORANGE      Appearance CLOUDY      Specific gravity 1.026 (H) 1.001 - 1.023      pH (UA) 5.0 5.0 - 9.0      Protein TRACE (A) NEG mg/dL    Glucose NEGATIVE  NEG mg/dL    Ketone TRACE (A) NEG mg/dL    Bilirubin MODERATE (A) NEG      Blood NEGATIVE  NEG      Urobilinogen 1.0 0.2 - 1.0 EU/dL    Nitrites NEGATIVE  NEG      Leukocyte Esterase TRACE (A) NEG      WBC 0-3 0 /hpf    Epithelial cells 0-3 0 /hpf    Bacteria 0 0 /hpf    Casts 10-20 0 /lpf       All Micro Results     Procedure Component Value Units Date/Time    CULTURE, URINE [[id number]] Collected:  02/24/18 2274    Order Status:  Completed Specimen:  Urine from Clean catch Updated:  02/24/18 7684          Other Studies:  Ct Head Wo Cont    Result Date: 2/24/2018  CT scan of the brain 2/24/2018 Scanning was performed within 24 hours of arrival to the facility Comparison: September 18, 2017 Dose reduction techniques used: Automated exposure control, adjustment of the mAs and/or kVp according to patient size, standardized low-dose protocol, and/or iterative reconstruction technique. Indication: Decreased mental status and weakness Findings:  The brain parenchyma and ventricular structures are remarkable for subcortical and periventricular white matter lucency. . There is normal white-grey matter differentiation. There are no interval mass lesions, hemorrhage, or evidence of an acute stroke. The calvarium and the sinuses are unremarkable for mucosal thickening of the left maxillary antrum. Impression: Supratentorial microischemia. Note: If a subtle CVA is suspected, MRI would be more definitive if clinically warranted. Xr Chest Port    Result Date: 2/24/2018  Portable chest x-ray: 2/24/2018 INDICATION: Dyspnea left facial droop COMPARISON: None Degenerative thoracic scoliosis and cardiomegaly is noted. Lung fields and soft tissues are intact     IMPRESSION: Degenerative thoracic scoliosis, cardiomegaly, clear lung fields      Assessment and Plan:     Hospital Problems as of 2/24/2018  Date Reviewed: 12/11/2017          Codes Class Noted - Resolved POA    Dehydration ICD-10-CM: E86.0  ICD-9-CM: 276.51  2/24/2018 - Present Unknown        TIA (transient ischemic attack) ICD-10-CM: G45.9  ICD-9-CM: 435.9  2/24/2018 - Present Unknown            1 - TIA  2 - Dehydration   3 - Renal Insufficiency  4 - HTN  5 - Dyslipidemia  6 - GERD    PLAN:  · Proceed with comprehensive stroke w/u, including Brain MRI, Carotid Doppler US and Echocardiogram. However my suspicion for an acute CVA is pretty low. · IV fluids with NS at 100 ml/hr  · Recheck BMP in the morning and monitor renal function  · Regarding his renal insufficiency, no baseline Cr is available. If his renal function improves with hydration, he will be considered for discharge home tomorrow with a Nephrology f/u. · Resume home antihypertensive medications, ASA, statins and PPI. DVT ppx: with Heparin SQ    Code status:  Full Code  Estimated LOS:  Less than 2 midnights. Likely discharge within the next 24 hours if stroke w/u negative   Risk:  high    Signed:   Shanta Adame MD

## 2018-02-26 NOTE — PROGRESS NOTES
Discharge papers given to son named Radha Jennings, opportunity for questions given. Verbalized understanding. IV removed. No signs and symptoms of distress upon transfer out. Personal belongings given to son. Nursing supervisor, transferred patient to car via wheelchair.

## 2018-02-26 NOTE — DISCHARGE INSTRUCTIONS
Transient Ischemic Attack: Care Instructions  Your Care Instructions    A transient ischemic attack (TIA) is when blood flow to a part of your brain is blocked for a short time. A TIA is like a stroke but usually lasts only a few minutes. A TIA does not cause lasting brain damage. Any vision problems, slurred speech, or other symptoms usually go away in 10 to 20 minutes. But they may last for up to 24 hours. TIAs are often warning signs of a stroke. Some people who have a TIA may have a stroke in the future. A stroke can cause symptoms like those of a TIA. But a stroke causes lasting damage to your brain. You can take steps to help prevent a stroke. One thing you can do is get early treatment. If you have other new symptoms, or if your symptoms do not get better, go back to the emergency room or call your doctor right away. Getting treatment right away may prevent long-term brain damage caused by a stroke. The doctor has checked you carefully, but problems can develop later. If you notice any problems or new symptoms, get medical treatment right away. Follow-up care is a key part of your treatment and safety. Be sure to make and go to all appointments, and call your doctor if you are having problems. It's also a good idea to know your test results and keep a list of the medicines you take. How can you care for yourself at home? Medicines  ? · Be safe with medicines. Take your medicines exactly as prescribed. Call your doctor if you think you are having a problem with your medicine. ? · If you take a blood thinner, such as aspirin, be sure you get instructions about how to take your medicine safely. Blood thinners can cause serious bleeding problems. ? · Call your doctor if you are not able to take your medicines for any reason.    ? · Do not take any over-the-counter medicines or herbal products without talking to your doctor first.   ? · If you take birth control pills or hormone therapy, talk to your doctor. Ask if these treatments are right for you. ? Lifestyle changes  ? · Do not smoke. If you need help quitting, talk to your doctor about stop-smoking programs and medicines. ? · Be active. If your doctor recommends it, get more exercise. Walking is a good choice. Bit by bit, increase the amount you walk every day. Try for at least 30 minutes on most days of the week. You also may want to swim, bike, or do other activities. ? · Eat heart-healthy foods. These include fruits, vegetables, high-fiber foods, fish, and foods that are low in sodium, saturated fat, and trans fat. ? · Stay at a healthy weight. Lose weight if you need to.   ? · Limit alcohol to 2 drinks a day for men and 1 drink a day for women. ?Staying healthy  ? · Manage other health problems such as diabetes, high blood pressure, and high cholesterol. ? · Get the flu vaccine every year. When should you call for help? Call 911 anytime you think you may need emergency care. For example, call if:  ? · You have new or worse symptoms of a stroke. These may include:  ¨ Sudden numbness, tingling, weakness, or loss of movement in your face, arm, or leg, especially on only one side of your body. ¨ Sudden vision changes. ¨ Sudden trouble speaking. ¨ Sudden confusion or trouble understanding simple statements. ¨ Sudden problems with walking or balance. ¨ A sudden, severe headache that is different from past headaches. Call 911 even if these symptoms go away in a few minutes. ? · You feel like you are having another TIA. ? Watch closely for changes in your health, and be sure to contact your doctor if you have any problems. Where can you learn more? Go to http://red-alena.info/. Enter [phone] in the search box to learn more about \"Transient Ischemic Attack: Care Instructions. \"  Current as of: March 20, 2017  Content Version: 11.4  © 2270-1769 [employer], [employer].  Care instructions adapted under license by [employer] (which disclaims liability or warranty for this information). If you have questions about a medical condition or this instruction, always ask your healthcare professional. [place] [address] any warranty or liability for your use of this information. Dehydration: Care Instructions  Your Care Instructions  Dehydration happens when your body loses too much fluid. This might happen when you do not drink enough water or you lose large amounts of fluids from your body because of diarrhea, vomiting, or sweating. Severe dehydration can be life-threatening. Water and minerals called electrolytes help put your body fluids back in balance. Learn the early signs of fluid loss, and drink more fluids to prevent dehydration. Follow-up care is a key part of your treatment and safety. Be sure to make and go to all appointments, and call your doctor if you are having problems. It's also a good idea to know your test results and keep a list of the medicines you take. How can you care for yourself at home? · To prevent dehydration, drink plenty of fluids, enough so that your urine is light yellow or clear like water. Choose water and other caffeine-free clear liquids until you feel better. If you have kidney, heart, or liver disease and have to limit fluids, talk with your doctor before you increase the amount of fluids you drink. · If you do not feel like eating or drinking, try taking small sips of water, sports drinks, or other rehydration drinks. · Get plenty of rest.  To prevent dehydration  · Add more fluids to your diet and daily routine, unless your doctor has told you not to. · During hot weather, drink more fluids. Drink even more fluids if you exercise a lot. Stay away from drinks with alcohol or caffeine. · Watch for the symptoms of dehydration. These include:  ¨ A dry, sticky mouth. ¨ Dark yellow urine, and not much of it. ¨ Dry and sunken eyes.   ¨ Feeling very tired.  · Learn what problems can lead to dehydration. These include:  ¨ Diarrhea, fever, and vomiting. ¨ Any illness with a fever, such as pneumonia or the flu. ¨ Activities that cause heavy sweating, such as endurance races and heavy outdoor work in hot or humid weather. ¨ Alcohol or drug abuse or withdrawal.  ¨ Certain medicines, such as cold and allergy pills (antihistamines), diet pills (diuretics), and laxatives. ¨ Certain diseases, such as diabetes, cancer, and heart or kidney disease. When should you call for help? Call 911 anytime you think you may need emergency care. For example, call if:  ? · You passed out (lost consciousness). ?Call your doctor now or seek immediate medical care if:  ? · You are confused and cannot think clearly. ? · You are dizzy or lightheaded, or you feel like you may faint. ? · You have signs of needing more fluids. You have sunken eyes and a dry mouth, and you pass only a little dark urine. ? · You cannot keep fluids down. ? Watch closely for changes in your health, and be sure to contact your doctor if:  ? · You are not making tears. ? · Your skin is very dry and sags slowly back into place after you pinch it. ? · Your mouth and eyes are very dry. Where can you learn more? Go to http://red-alena.info/. Enter I758 in the search box to learn more about \"Dehydration: Care Instructions. \"  Current as of: March 20, 2017  Content Version: 11.4  © 5792-2638 [employer]. Care instructions adapted under license by [employer] (which disclaims liability or warranty for this information). If you have questions about a medical condition or this instruction, always ask your healthcare professional. [place] [address] any warranty or liability for your use of this information.       DISCHARGE SUMMARY from Nurse    PATIENT INSTRUCTIONS:    After general anesthesia or intravenous sedation, for 24 hours or while taking prescription Narcotics:  · Limit your activities  · Do not drive and operate hazardous machinery  · Do not make important personal or business decisions  · Do  not drink alcoholic beverages  · If you have not urinated within 8 hours after discharge, please contact your surgeon on call. Report the following to your surgeon:  · Excessive pain, swelling, redness or odor of or around the surgical area  · Temperature over 100.5  · Nausea and vomiting lasting longer than 4 hours or if unable to take medications  · Any signs of decreased circulation or nerve impairment to extremity: change in color, persistent  numbness, tingling, coldness or increase pain  · Any questions    What to do at Home:  Recommended activity: Activity as tolerated    If you experience any of the following symptoms one sided weakness, slurred speech, please follow up with primary care physician or go tot he nearest emergency room. *  Please give a list of your current medications to your Primary Care Provider. *  Please update this list whenever your medications are discontinued, doses are      changed, or new medications (including over-the-counter products) are added. *  Please carry medication information at all times in case of emergency situations. These are general instructions for a healthy lifestyle:    No smoking/ No tobacco products/ Avoid exposure to second hand smoke  Surgeon General's Warning:  Quitting smoking now greatly reduces serious risk to your health.     Obesity, smoking, and sedentary lifestyle greatly increases your risk for illness    A healthy diet, regular physical exercise & weight monitoring are important for maintaining a healthy lifestyle    You may be retaining fluid if you have a history of heart failure or if you experience any of the following symptoms:  Weight gain of 3 pounds or more overnight or 5 pounds in a week, increased swelling in our hands or feet or shortness of breath while lying flat in bed. Please call your doctor as soon as you notice any of these symptoms; do not wait until your next office visit. Recognize signs and symptoms of STROKE:    F-face looks uneven    A-arms unable to move or move unevenly    S-speech slurred or non-existent    T-time-call 911 as soon as signs and symptoms begin-DO NOT go       Back to bed or wait to see if you get better-TIME IS BRAIN. Warning Signs of HEART ATTACK     Call 911 if you have these symptoms:   Chest discomfort. Most heart attacks involve discomfort in the center of the chest that lasts more than a few minutes, or that goes away and comes back. It can feel like uncomfortable pressure, squeezing, fullness, or pain.  Discomfort in other areas of the upper body. Symptoms can include pain or discomfort in one or both arms, the back, neck, jaw, or stomach.  Shortness of breath with or without chest discomfort.  Other signs may include breaking out in a cold sweat, nausea, or lightheadedness. Don't wait more than five minutes to call 911 - MINUTES MATTER! Fast action can save your life. Calling 911 is almost always the fastest way to get lifesaving treatment. Emergency Medical Services staff can begin treatment when they arrive -- up to an hour sooner than if someone gets to the hospital by car. The discharge information has been reviewed with the patient. The patient verbalized understanding. Discharge medications reviewed with the patient and appropriate educational materials and side effects teaching were provided.   ___________________________________________________________________________________________________________________________________

## 2018-02-26 NOTE — DISCHARGE SUMMARY
Hospitalist Discharge Summary     Admit Date:  2018  2:56 PM   Name:  Tessa Garg   Age:  80 y.o.  :  1920   MRN:  [id number]   PCP:  Bibi Barreto MD  Treatment Team: Attending Provider: Teetee Ng MD; Utilization Review: Meryl Vital    Problem List for this Hospitalization:  Hospital Problems as of 2018  Date Reviewed: 2017          Codes Class Noted - Resolved POA    Dehydration ICD-10-CM: E86.0  ICD-9-CM: 276.51  2018 - Present Yes        * (Principal)TIA (transient ischemic attack) ICD-10-CM: G45.9  ICD-9-CM: 435.9  2018 - Present Yes                  Hospital Course: The patient is a 81 y/o gentleman with HTN, Dyslipidemia, GERD, presented to the ED at [facility] with complaints of generalized weakness and transient episode of unresponsiveness. The patient was seen at the bedside accompanied by family members. It seems like the patient has not been feeling well since this morning. He was found nearly unresponsive at his independent living facility by his son. At this time, the patient was unable to talk and was extremely weak. He was very slow to respond. He mentioned to a staff member at the facility that he was not feeling well and did not feel like eating breakfast or lunch. He was brought to the ED for medical evaluation. On arrival to the ED, no focal neurological deficits were noted. The patient appeared dry and was given IV fluids. Labs obtained in the ED noted a Cr of 2.1, unfortunately his baseline is unknown. All the other labs were basically unremarkable. Head CT did not show any evidence of acute stroke. Based on his clinical presentation, the Hospitalist service was contacted and the patient was admitted to the Telemetry Floor for TIA and Dehydration. The patient was placed on IV fluids and his renal function basically came back to normal, with a Cr going down from 2.1 to 1.13. He did have some PANKAJ and Dehydration.  Stroke w/u was obtained, with no significant abnormalities. Carotid doppler US noted less than 50% stenosis on the right ICA and 50-69% stenosis in the left ICA. Brain MRI was negative for any acute infarction. The patient has remained clinically and hemodynamically stable throughout his hospital stay and is then being discharged to his independent living facility today. Follow up instructions below. Plan was discussed with the patient, his family and the nursing staff. All questions answered. Patient was stable at time of discharge and was instructed to call or return if there are any concerns or recurrence of symptoms. Diagnostic Imaging/Tests:   Mri Brain Wo Cont    Result Date: 2/25/2018  MRI BRAIN WITHOUT CONTRAST. HISTORY: Transient unresponsiveness. COMPARISON:  None. Study performed within 24 hours of admission. TECHNIQUE:  Sagittal T1, axial T1, T2, FLAIR, gradient echo, diffusion with ADC map and coronal FLAIR. FINDINGS: Moderate periventricular and centrum semiovale FLAIR and T2 white matter hyperintensities are present; these are nonspecific and may represent chronic small vessel disease. Diffusion images do not demonstrate any areas of restricted diffusion to suggest acute infarction. No midline shift, mass or mass effect. Gradient echo images are are unremarkable. There is symmetric volume loss. No evidence of acute hemorrhage. The lateral ventricles are normal size. The pituitary, parasellar and midline structures are unremarkable on the sagittal T1 images. There are normal T2 flow-voids in the major vessels. Posterior fossa structures are unremarkable. The basal ganglia appear symmetric. Orbits are grossly normal.  Paranasal sinuses are clear. IMPRESSION: Negative for acute infarction. Chronic changes as above.      Ct Head Wo Cont    Result Date: 2/24/2018  CT scan of the brain 2/24/2018 Scanning was performed within 24 hours of arrival to the facility Comparison: September 18, 2017 Dose reduction techniques used: Automated exposure control, adjustment of the mAs and/or kVp according to patient size, standardized low-dose protocol, and/or iterative reconstruction technique. Indication: Decreased mental status and weakness Findings: The brain parenchyma and ventricular structures are remarkable for subcortical and periventricular white matter lucency. . There is normal white-grey matter differentiation. There are no interval mass lesions, hemorrhage, or evidence of an acute stroke. The calvarium and the sinuses are unremarkable for mucosal thickening of the left maxillary antrum. Impression: Supratentorial microischemia. Note: If a subtle CVA is suspected, MRI would be more definitive if clinically warranted. Xr Chest Port    Result Date: 2/24/2018  Portable chest x-ray: 2/24/2018 INDICATION: Dyspnea left facial droop COMPARISON: None Degenerative thoracic scoliosis and cardiomegaly is noted. Lung fields and soft tissues are intact     IMPRESSION: Degenerative thoracic scoliosis, cardiomegaly, clear lung fields    Duplex Carotid Bilateral    Result Date: 2/25/2018  HISTORY: Transient ischemic attack FINDINGS: Duplex doppler carotid ultrasound exams performed of both the right and left side of the neck. NASCET criteria. Peak systolic velocity right common carotid artery 103 cm/s, right internal carotid of 117 cm/s with a ratio of 1.1 on the right. Right internal carotid artery end-diastolic velocity of 23 cm/s. Peak systolic velocity left common carotid artery 109 cm/s, internal carotid of 157 cm/s with a ratio of 1.4 on the left. Left internal carotid artery end-diastolic velocity of 19 cm/s. Antegrade flow right vertebral artery and antegrade flow left vertebral artery. Grayscale and color-flow imaging reveal bilateral carotid artery atherosclerosis. IMPRESSION: Stenosis at the right internal carotid artery of less than 50%.  Stenosis at the left internal carotid artery in the range of 50-69%. Echocardiogram results:  No results found for this visit on 02/24/18. All Micro Results     Procedure Component Value Units Date/Time    CULTURE, URINE [[id number]] Collected:  02/24/18 1544    Order Status:  Completed Specimen:  Urine from Clean catch Updated:  02/25/18 0804     Special Requests: NO SPECIAL REQUESTS        Culture result:         NO GROWTH AFTER SHORT PERIOD OF INCUBATION. FURTHER RESULTS TO FOLLOW AFTER OVERNIGHT INCUBATION.           Labs: Results:       BMP, Mg, Phos Recent Labs      02/25/18   0412  02/24/18   1507   NA  141  141   K  3.9  4.5   CL  108*  103   CO2  25  26   AGAP  8  12   BUN  20  29*   CREA  1.13  2.10*   CA  7.9*  9.3   GLU  89  123*   MG  2.4  2.5*      CBC Recent Labs      02/25/18   0412  02/24/18   1507   WBC  2.7*  6.5   RBC  3.28*  3.95*   HGB  10.9*  13.1*   HCT  32.6*  38.8*   PLT  143*  189   GRANS   --   76   LYMPH   --   16   EOS   --   0*   MONOS   --   8   BASOS   --   0   IG   --   0   ANEU   --   4.9   ABL   --   1.1   SKYLER   --   0.0   ABM   --   0.5   ABB   --   0.0   AIG   --   0.0      LFT Recent Labs      02/24/18   1507   SGOT  20   ALT  13   AP  81   TP  7.1   ALB  3.8   GLOB  3.3   AGRAT  1.2      Cardiac Testing No results found for: BNPP, BNP, CPK, RCK1, RCK2, RCK3, RCK4, CKMB, CKNDX, CKND1, TROPT, TROIQ   Coagulation Tests Lab Results   Component Value Date/Time    INR 1.0 09/06/2017 04:14 PM    INR 1.0 09/06/2017 03:55 PM      A1c Lab Results   Component Value Date/Time    Hemoglobin A1c 5.1 02/25/2018 04:12 AM      Lipid Panel Lab Results   Component Value Date/Time    Cholesterol, total 115 02/25/2018 04:12 AM    HDL Cholesterol 50 02/25/2018 04:12 AM    LDL, calculated 54 02/25/2018 04:12 AM    VLDL, calculated 11 02/25/2018 04:12 AM    Triglyceride 55 02/25/2018 04:12 AM    CHOL/HDL Ratio 2.3 02/25/2018 04:12 AM      Thyroid Panel Lab Results   Component Value Date/Time    TSH 0.392 02/25/2018 04:12 AM    TSH 1.070 09/06/2017 03:55 PM        Most Recent UA Lab Results   Component Value Date/Time    Color ORANGE 02/24/2018 03:44 PM    Appearance CLOUDY 02/24/2018 03:44 PM    pH (UA) 5.0 02/24/2018 03:44 PM    Protein TRACE (A) 02/24/2018 03:44 PM    Glucose NEGATIVE  02/24/2018 03:44 PM    Ketone TRACE (A) 02/24/2018 03:44 PM    Bilirubin MODERATE (A) 02/24/2018 03:44 PM    Blood NEGATIVE  02/24/2018 03:44 PM    Urobilinogen 1.0 02/24/2018 03:44 PM    Nitrites NEGATIVE  02/24/2018 03:44 PM    Leukocyte Esterase TRACE (A) 02/24/2018 03:44 PM        No Known Allergies  Immunization History   Administered Date(s) Administered    Influenza High Dose Vaccine PF 09/06/2017    Influenza Vaccine 10/01/2016    Pneumococcal Conjugate (PCV-13) 10/01/2015    Pneumococcal Polysaccharide (PPSV-23) 10/01/2016    Tdap 09/06/2017       All Labs from Last 24 Hrs:  Recent Results (from the past 24 hour(s))   LIPID PANEL    Collection Time: 02/25/18  4:12 AM   Result Value Ref Range    LIPID PROFILE          Cholesterol, total 115 <200 MG/DL    Triglyceride 55 35 - 150 MG/DL    HDL Cholesterol 50 40 - 60 MG/DL    LDL, calculated 54 <100 MG/DL    VLDL, calculated 11 6.0 - 23.0 MG/DL    CHOL/HDL Ratio 2.3     HEMOGLOBIN A1C WITH EAG    Collection Time: 02/25/18  4:12 AM   Result Value Ref Range    Hemoglobin A1c 5.1 4.8 - 6.0 %    Est. average glucose 100 mg/dL   TSH 3RD GENERATION    Collection Time: 02/25/18  4:12 AM   Result Value Ref Range    TSH 0.392 0.358 - 5.906 uIU/mL   METABOLIC PANEL, BASIC    Collection Time: 02/25/18  4:12 AM   Result Value Ref Range    Sodium 141 136 - 145 mmol/L    Potassium 3.9 3.5 - 5.1 mmol/L    Chloride 108 (H) 98 - 107 mmol/L    CO2 25 21 - 32 mmol/L    Anion gap 8 7 - 16 mmol/L    Glucose 89 65 - 100 mg/dL    BUN 20 8 - 23 MG/DL    Creatinine 1.13 0.8 - 1.5 MG/DL    GFR est AA >60 >60 ml/min/1.73m2    GFR est non-AA >60 >60 ml/min/1.73m2    Calcium 7.9 (L) 8.3 - 10.4 MG/DL   CBC W/O DIFF    Collection Time: 02/25/18 4:12 AM   Result Value Ref Range    WBC 2.7 (L) 4.3 - 11.1 K/uL    RBC 3.28 (L) 4.23 - 5.67 M/uL    HGB 10.9 (L) 13.6 - 17.2 g/dL    HCT 32.6 (L) 41.1 - 50.3 %    MCV 99.4 (H) 79.6 - 97.8 FL    MCH 33.2 (H) 26.1 - 32.9 PG    MCHC 33.4 31.4 - 35.0 g/dL    RDW 13.9 11.9 - 14.6 %    PLATELET 193 (L) 482 - 450 K/uL    MPV 9.1 (L) 10.8 - 14.1 FL   MAGNESIUM    Collection Time: 02/25/18  4:12 AM   Result Value Ref Range    Magnesium 2.4 1.8 - 2.4 mg/dL       Discharge Exam:  Patient Vitals for the past 24 hrs:   Temp Pulse Resp BP SpO2   02/25/18 1741 - 62 22 157/74 -   02/25/18 1509 - 60 13 108/45 -   02/25/18 1433 - 68 14 112/42 -   02/25/18 1309 - (!) 59 20 147/59 -   02/25/18 1209 - 62 16 119/52 -   02/25/18 1109 - 69 13 128/62 -   02/25/18 1011 - 63 28 110/44 -   02/25/18 0909 - 69 17 123/51 97 %   02/25/18 0835 - - - - 97 %   02/25/18 0814 98.5 °F (36.9 °C) 68 20 125/63 98 %   02/25/18 0809 - 75 19 125/63 98 %   02/25/18 0709 - 62 14 (!) 113/39 97 %   02/25/18 0609 - 62 18 110/40 96 %   02/25/18 0504 - 63 20 110/54 95 %   02/25/18 0409 98.3 °F (36.8 °C) 62 17 110/54 97 %   02/25/18 0309 - 61 14 (!) 102/30 96 %   02/25/18 0212 - 66 16 (!) 91/38 99 %   02/25/18 0120 - 60 14 107/41 99 %   02/25/18 0009 96.7 °F (35.9 °C) 66 16 113/45 97 %   02/24/18 2309 - 71 18 93/44 97 %   02/24/18 2209 - 71 13 (!) 98/37 95 %   02/24/18 2159 - 76 23 - (!) 85 %   02/24/18 2144 - 71 27 - (!) 88 %   02/24/18 2129 - 73 27 - (!) 87 %   02/24/18 2109 - 76 15 (!) 101/37 97 %   02/24/18 2009 - 83 17 132/54 97 %   02/24/18 1909 99.1 °F (37.3 °C) 79 25 148/61 -     Oxygen Therapy  O2 Sat (%): 97 % (02/25/18 0909)  Pulse via Oximetry: 70 beats per minute (02/25/18 0909)  O2 Device: Nasal cannula (02/25/18 0835)  O2 Flow Rate (L/min): 2 l/min (02/25/18 0835)    Intake/Output Summary (Last 24 hours) at 02/25/18 1906  Last data filed at 02/25/18 1653   Gross per 24 hour   Intake             1023 ml   Output              600 ml   Net 423 ml       General:    Well nourished. Alert. No distress. Eyes:   Normal sclera. Extraocular movements intact. ENT:  Normocephalic, atraumatic. Moist mucous membranes  CV:   Regular rate and rhythm. No murmur, rub, or gallop. Lungs:  Clear to auscultation bilaterally. No wheezing, rhonchi, or rales. Abdomen: Soft, nontender, nondistended. Bowel sounds normal.   Extremities: Warm and dry. No cyanosis or edema. Neurologic: CN II-XII grossly intact. Sensation intact. Skin:     No rashes or jaundice. Psych:  Normal mood and affect. Discharge Info:   Current Discharge Medication List      CONTINUE these medications which have NOT CHANGED    Details   magnesium 250 mg tab Take  by mouth.      lovastatin (MEVACOR) 40 mg tablet Take 1 Tab by mouth nightly. Qty: 30 Tab, Refills: 3    Associated Diagnoses: Hyperlipidemia, unspecified hyperlipidemia type      omeprazole (PRILOSEC) 20 mg capsule Take 1 Cap by mouth daily. Qty: 30 Cap, Refills: 3    Associated Diagnoses: Gastroesophageal reflux disease, esophagitis presence not specified      amLODIPine (NORVASC) 5 mg tablet Take 1 Tab by mouth daily. Qty: 30 Tab, Refills: 3    Associated Diagnoses: Essential (primary) hypertension      aspirin 81 mg chewable tablet Take 81 mg by mouth daily. Associated Diagnoses: Other pulmonary embolism without acute cor pulmonale, unspecified chronicity (HCC)               Disposition: Independent Living Facility    Activity: Activity as tolerated  Diet: DIET CARDIAC Regular    Follow-up Appointments   Procedures    FOLLOW UP VISIT Appointment in: One Week With PCP in 1-2 weeks. With PCP in 1-2 weeks. Standing Status:   Standing     Number of Occurrences:   1     Order Specific Question:   Appointment in     Answer:    One Week         Follow-up Information     Follow up With Details Comments [facility] [address]  [phone] Signed:   Sunil Darling MD

## 2018-02-27 PROBLEM — G45.9 TIA (TRANSIENT ISCHEMIC ATTACK): Status: RESOLVED | Noted: 2018-02-24 | Resolved: 2018-02-27

## 2018-02-27 PROBLEM — E86.0 DEHYDRATION: Status: RESOLVED | Noted: 2018-02-24 | Resolved: 2018-02-27

## 2018-02-27 LAB
BACTERIA SPEC CULT: NORMAL
SERVICE CMNT-IMP: NORMAL

## 2018-04-25 NOTE — ED PROVIDER NOTES
HPI:  80 male here status post fall with head injury. Stat up while sitting down and fell backward hitting the back of the wall. Denies loss of consciousness. Stated he started walking to the bathroom when he felt blood running down the back of his neck. Denies any other symptoms at this time. Not on anticoagulant other than baby aspirin daily. No weakness/tingling/numbness. No vision changes. No headache. No chest pain or shortness of breath before or after the fall. ROS  Constitutional: No fever, no chills  Skin: no rash  Eye: No vision changes  ENMT:   Respiratory: No shortness of breath  Cardiovascular: No chest pain, no palpitations  Gastrointestinal: No vomiting, no nausea, no diarrhea, no constipation, no rectal bleeding, no abdominal pain  : No dysuria, no hematuria  MSK: No back pain, no muscle pain  Neuro: No headache, no change in mental status, no numbness, no tingling, no weakness     All other review of systems positive per history of present illness and the above otherwise negative or noncontributory. Visit Vitals    /69 (BP 1 Location: Left arm, BP Patient Position: At rest)    Pulse 62    Temp 97.6 °F (36.4 °C)    Resp 16    Ht 5' 4\" (1.626 m)    Wt 66.7 kg (147 lb)    SpO2 97%    BMI 25.23 kg/m2     Past Medical History:   Diagnosis Date    Diverticulitis     Essential (primary) hypertension     GERD (gastroesophageal reflux disease)     Hyperlipidemia     Memory loss     Osteoporosis     Pulmonary embolism (HCC)     only one episode with simultaneous DVT. Patient and family deny hypercoaguable work up   [facility]     Volvulus of colon ([facility] [address]      Past Surgical History:   Procedure Laterality Date    HX HERNIA REPAIR      HX LAP CHOLECYSTECTOMY      HX TONSIL AND ADENOIDECTOMY       Prior to Admission Medications   Prescriptions Last Dose Informant Patient Reported? Taking? amLODIPine (NORVASC) 5 mg tablet   No No   Sig: Take 1 Tab by mouth daily. aspirin 81 mg chewable tablet   Yes No   Sig: Take 81 mg by mouth daily. buPROPion XL (WELLBUTRIN XL) 150 mg tablet   No No   Sig: Take 1 Tab by mouth every morning. lovastatin (MEVACOR) 40 mg tablet   No No   Sig: Take 1 Tab by mouth nightly.   magnesium 250 mg tab   Yes No   Sig: Take  by mouth. omeprazole (PRILOSEC) 20 mg capsule   No No   Sig: Take 1 Cap by mouth daily. Facility-Administered Medications: None         Adult Exam   General: alert, no acute distress  Head: midline occipital with a 1.5 cm laceration  Good hemostasis. ENT: moist mucous membranes  Neck: supple, non-tender; full range of motion  Cardiovascular:  Respiratory: normal respirations;  Gastrointestinal: soft, non-tender;   Back: non-tender, full range of motion  Musculoskeletal: normal range of motion, normal strength, no gross deformities  Neurological: alert and oriented x 4, no gross focal deficits; normal speech  Psychiatric: cooperative; appropriate mood and affect    MDM: laceration repair with 3 staples. Tolerated well. CT head obtained. No intracranial hemorrhage noted. We'll discharge home. Do not suspect CVA, TIA, dissection for ACS, PE or any other pathology causing the fall. Likely loss of balance fell hitting the back of head on wall. Ct Head Wo Cont    Result Date: 4/25/2018  CT HEAD WITHOUT CONTRAST, 4/25/2018 History: Fall backwards into wall. Comparison: CT head without contrast 2/24/2018 Technique:   5 mm axial scans from the skull base to the vertex. All CT scans performed at this facility use one or all of the following: Automated exposure control, adjustment of the mA and/or kVp according to patient's size, iterative reconstruction. Findings:  No evidence of intracranial hemorrhage is seen. Stable moderate bilateral basal ganglia calcifications are seen. No abnormal extra-axial fluid collections are seen.   Moderate cortical involutional changes are seen which are not abnormal given the patient's age.  No evidence for acute hydrocephalus is seen. No evidence of midline shift or herniation is seen. No abnormal edema pattern is seen in a vascular distribution to suggest large artery infarction. Only vague periventricular white matter hypoattenuation is seen which is felt to be chronic most likely representing moderate chronic microangiopathic changes. Wilbur apparently repairing a scalp laceration are seen overlying the left occipital bone on image 20. Evaluation with bone windows shows no acute osseous changes. The visualized sinuses, middle ears, and mastoid air cells are well aerated. IMPRESSION:  1. No acute intracranial process evident by noncontrast CT study of the head. Procedure Note - Laceration repair  Performed by: Harrison Mitchell MD  Immediately prior to the procedure, the patient was reevaluated and found suitable for the planned procedure and any planned medications. Immediately prior to the procedure a timeout was called to verify the correct patient, procedure, equipment, and markings as appropriate. Complexity: simple   A 1.5 cm laceration to the scalp was irrigated copiously, prepped as appropriate. The area was anesthetized with 2 mLs of 1% lidocaine. The wound was explored and no foreign bodies were found. The wound was repaired with 3 staples. The wound was closed with good hemostasis and approximation. A dressing was applied. The procedure took 5 minutes. The patient tolerated the procedure well. Dragon voice recognition software was used to create this note. Although the note has been reviewed and corrected where necessary, additional errors may have been overlooked and remain in the text.

## 2018-04-25 NOTE — ED TRIAGE NOTES
Backwards fall from standing into wall. Denies LOC. Small laceration to back of head. Denies dizziness. Pt is not on blood thinners except 81 ASA a day. Hx of dementia. Hospice pt for unknown reason. Family is en route.

## 2018-04-25 NOTE — ED NOTES
I have reviewed discharge instructions with the patient and family. The patient and family verbalized understanding. Patient left ED via Discharge Method: wheelchair to [address] with son. Opportunity for questions and clarification provided. Patient given 0 scripts. To continue your aftercare when you leave the hospital, you may receive an automated call from our care team to check in on how you are doing. This is a free service and part of our promise to provide the best care and service to meet your aftercare needs.  If you have questions, or wish to unsubscribe from this service please call [phone]. Thank you for Choosing our [facility] Emergency Department.

## 2018-04-25 NOTE — PROGRESS NOTES
CM met with patient who is a 80 y.o male being seen in the ED for a fall. Patient states that he moved to [place] from [facility] 6 weeks ago to be near his son. Patient states that his son Norma Wall and his wife live nearby, but he lives alone in his apartment. Patient states that he is independent in his ALDs and stopped driving when he moved to [place] at his son's request. Patient states that he has a walker, but does not use it. Patient states that he graduated from  with [employer] recently. Rosa Elena Haines states that the patient has fallen multiple times, the last being a week and a half ago. CM asked patient what happened today and he stated that he was getting up from his chair and lost his balance. Patient states he twisted around and hit his head on the wall. CM asked the patient if he felt that having his walker there to help him balance and get his footing upon getting up would have benefited him, and his son and daughter-in-law agreed that it would. Patient stated that he would use his walker from now on at least when he's getting up, or early in the morning when he's waking up. Patient has a walker in a closet in his apartment. Patient states that he will get it out and start using it.

## 2018-05-01 NOTE — ED NOTES
I have reviewed discharge instructions with the patient. The patient verbalized understanding. Patient left ED via Discharge Method: ambulatory to Home with son. Opportunity for questions and clarification provided. Patient given 0 scripts. To continue your aftercare when you leave the hospital, you may receive an automated call from our care team to check in on how you are doing. This is a free service and part of our promise to provide the best care and service to meet your aftercare needs.  If you have questions, or wish to unsubscribe from this service please call [phone]. Thank you for Choosing our [facility] Emergency Department.

## 2018-05-01 NOTE — ED PROVIDER NOTES
Patient is a 80 y.o. male presenting with staple removal. The history is provided by the patient. Staple Removal    This is a new problem. The current episode started more than 2 days ago. Past Medical History:   Diagnosis Date    Diverticulitis     Essential (primary) hypertension     GERD (gastroesophageal reflux disease)     Hyperlipidemia     Memory loss     Osteoporosis     Pulmonary embolism ([address]     only one episode with simultaneous DVT. Patient and family deny hypercoaguable work up   [place]  Varicella     Volvulus of colon ([address]        Past Surgical History:   Procedure Laterality Date    HX HERNIA REPAIR      HX LAP CHOLECYSTECTOMY      HX TONSIL AND ADENOIDECTOMY           Family History:   Problem Relation Age of Onset    Diabetes Mother        Social History     Social History    Marital status:      Spouse name: N/A    Number of children: N/A    Years of education: N/A     Occupational History    Industrial Management      Social History Main Topics    Smoking status: Former Smoker     Quit date: 1/1/1980    Smokeless tobacco: Never Used    Alcohol use 3.6 oz/week     6 Standard drinks or equivalent per week      Comment: daily    Drug use: Not on file    Sexual activity: Not on file     Other Topics Concern    Not on file     Social History Narrative         ALLERGIES: Review of patient's allergies indicates no known allergies. Review of Systems   Skin: Positive for wound. Negative for rash. Vitals:    05/01/18 0718   BP: 159/69   Pulse: 67   Resp: 16   Temp: 97.8 °F (36.6 °C)   SpO2: 97%   Weight: 66.7 kg (147 lb)   Height: 5' 4\" (1.626 m)            Physical Exam   Constitutional: He is oriented to person, place, and time. He appears well-developed and well-nourished. No distress. HENT:   Head: Normocephalic and atraumatic.    1.5 cm occipital scalp laceration well healed, 3 staples in place   Eyes: Conjunctivae and EOM are normal. Right eye exhibits no discharge. Left eye exhibits no discharge. Neck: Normal range of motion. Neck supple. Pulmonary/Chest: Effort normal. No respiratory distress. Musculoskeletal: Normal range of motion. Neurological: He is alert and oriented to person, place, and time. He has normal strength. He exhibits normal muscle tone. cni 2-12 grossly  Nl gait,  Nl speech     Skin: Skin is warm and dry. No rash noted. He is not diaphoretic. Psychiatric: He has a normal mood and affect. His behavior is normal.   Nursing note and vitals reviewed. MDM  Number of Diagnoses or Management Options  Encounter for staple removal:   Diagnosis management comments: Medical decision making note:  Render Knights in  Staples out  This concludes the \"medical decision making note\" part of this emergency department visit note.           ED Course       Procedures

## 2018-05-01 NOTE — DISCHARGE INSTRUCTIONS
Learning About Stitches and Staples Removal  When are stitches and staples removed? Your doctor will tell you when to have your stitches or staples removed, usually in 7 to 14 days. How long you'll be told to wait will depend on things like where the wound is located, how big and how deep the wound is, and what your general health is like. Do not remove the stitches on your own. Stitches on the face are usually removed within a week. But stitches and staples on other areas of the body, such as on the back or belly or over a joint, may need to stay in place longer, often a week or two. Be sure to follow your doctor's instructions. How are stitches and staples removed? It usually doesn't hurt when the doctor removes the stitches or staples. You may feel a tug as each stitch or staple is removed. · You will either be seated or lying down. · To remove stitches, the doctor will use scissors to cut each of the knots and then pull the threads out. · To remove staples, the doctor will use a tool to take out the staples one at a time. · The area may still feel tender after the stitches or staples are gone. But it should feel better within a few minutes or up to a few hours. What can you expect after stitches and staples are removed? Depending on the type and location of the cut, you will have a scar. Scars usually fade over time. Keep the area clean, but you won't need a bandage. When should you call for help? Call your doctor now or seek immediate medical care if :  · You have new pain, or your pain gets worse. · You have trouble moving the area near the scar. · You have symptoms of infection, such as:  ¨ Increased pain, swelling, warmth, or redness around the scar. ¨ Red streaks leading from the scar. ¨ Pus draining from the scar. ¨ A fever. Watch closely for changes in your health, and be sure to contact your doctor if:  · The scar opens. · You do not get better as expected.   Follow-up care is a key part of your treatment and safety. Be sure to make and go to all appointments, and call your doctor if you do not get better as expected. It's also a good idea to keep a list of the medicines you take. Where can you learn more? Go to http://red-alena.info/. Enter R397 in the search box to learn more about \"Learning About Stitches and Staples Removal.\"  Current as of: March 20, 2017  Content Version: 11.4  © 6904-5930 [employer]. Care instructions adapted under license by [employer] (which disclaims liability or warranty for this information). If you have questions about a medical condition or this instruction, always ask your healthcare professional. [address] any warranty or liability for your use of this information.

## 2018-12-31 PROBLEM — N17.9 ACUTE KIDNEY INJURY (HCC): Status: ACTIVE | Noted: 2018-01-01

## 2018-12-31 PROBLEM — J18.9 CAP (COMMUNITY ACQUIRED PNEUMONIA): Status: ACTIVE | Noted: 2018-01-01

## 2018-12-31 NOTE — H&P
HOSPITALIST H&P/CONSULTNAME:  [facility] Age:  80 y.o. 
:   1920 MRN:   [id number] PCP: Madhu Machado MD 
Consulting MD: Treatment Team: Attending Provider: Yanely Hernandez MD; Primary Nurse: Gary Rivero RN; Care Manager: Cordelia Alva 
HPI:  
79 yo CM with past history of HTN, HLD, osteoporosis, pulmonary embolism (not on anticoagulation), and GERD presents from independent living facility [facility]) after observed GLF while patient was walking back to his room earlier today. Patient grabbed onto his door and felt like he was falling down, was caught by a bystander, patient describes that he felt \"frozen. \" He felt lightheaded but did not pass out. Of note, patient was hospitalized for a similarly occurring episode in 2018 with negative inpatient stroke workup. He denies chest pain, headache, shortness of breath, abdominal pain, or diarrhea. Family at bedside states that he has been getting more confused lately and has gotten lost trying to go to breakfast \"and then he gets very anxious. \"  
 
ED Course: CT head negative. CXR showing right basilar infiltrate. Given empiric Rocephin and ceftriaxone and fluids. SCr of 1.9 (baseline of 1.1 to 1.2). Hospitalist called for admission Complete ROS done and is as stated in HPI or otherwise negative Past Medical History:  
Diagnosis Date  Diverticulitis  Essential (primary) hypertension  GERD (gastroesophageal reflux disease)  Hyperlipidemia  Memory loss  Osteoporosis  Pulmonary embolism ([address]   
 only one episode with simultaneous DVT. Patient and family deny hypercoaguable work up  Varicella  Volvulus of colon ([address] Past Surgical History:  
Procedure Laterality Date  HX HERNIA REPAIR    
 HX LAP CHOLECYSTECTOMY  HX TONSIL AND ADENOIDECTOMY Prior to Admission Medications Prescriptions Last Dose Informant Patient Reported? Taking? amLODIPine (NORVASC) 5 mg tablet   No No  
Sig: Take 1 Tab by mouth daily. aspirin 81 mg chewable tablet   Yes No  
Sig: Take 81 mg by mouth daily. buPROPion XL (WELLBUTRIN XL) 150 mg tablet   No No  
Sig: Take 1 Tab by mouth every morning. lovastatin (MEVACOR) 40 mg tablet   No No  
Sig: Take 1 Tab by mouth nightly. omeprazole (PRILOSEC) 20 mg capsule   No No  
Sig: Take 1 Cap by mouth daily. Facility-Administered Medications: None No Known Allergies Social History Tobacco Use  Smoking status: Former Smoker Last attempt to quit: 1980 Years since quittin.0  Smokeless tobacco: Never Used Substance Use Topics  Alcohol use: Yes Alcohol/week: 3.6 oz Types: 6 Standard drinks or equivalent per week Comment: daily Family History Problem Relation Age of Onset  Diabetes Mother Objective:  
 
Visit Vitals /55 Pulse 80 Temp 97.6 °F (36.4 °C) Resp 18 Ht 5' 9\" (1.753 m) Wt 63.5 kg (140 lb) SpO2 96% BMI 20.67 kg/m² Temp (24hrs), Av.6 °F (36.4 °C), Min:97.6 °F (36.4 °C), Max:97.6 °F (36.4 °C) Oxygen Therapy O2 Sat (%): 96 % (18 1300) Pulse via Oximetry: 71 beats per minute (18 1300) O2 Device: Room air (18 7530) Physical Exam: 
General:    Alert, cooperative, no distress, appears stated age. Head:   Normocephalic, without obvious abnormality, atraumatic. Nose:  Nares normal. No drainage or sinus tenderness. Lungs:   Clear to auscultation bilaterally. No Wheezing or Rhonchi. No rales. Heart:   Regular rate and rhythm,  no murmur, rub or gallop. Abdomen:   Soft, non-tender. Not distended. Bowel sounds normal.  
Extremities: No cyanosis. No edema. No clubbing Skin:     Texture, turgor normal. No rashes or lesions. Not Jaundiced Neurologic: Alert and oriented, normal finger-to-nose testing, +5/5 muscle strength of UEs and LEs, sensation to light touch intact of face/arms/legs, CN II through XII intact b/l, normal speech Data Review:  
Recent Results (from the past 24 hour(s)) EKG, 12 LEAD, INITIAL Collection Time: 12/31/18 11:51 AM  
Result Value Ref Range Ventricular Rate 74 BPM  
 Atrial Rate 74 BPM  
 P-R Interval 274 ms QRS Duration 84 ms Q-T Interval 360 ms QTC Calculation (Bezet) 399 ms Calculated P Axis 69 degrees Calculated R Axis 43 degrees Calculated T Axis 66 degrees Diagnosis Sinus rhythm with 1st degree A-V block Otherwise normal ECG When compared with ECG of 17-NOV-2014 22:11, 
PA interval has increased CBC WITH AUTOMATED DIFF Collection Time: 12/31/18 12:23 PM  
Result Value Ref Range WBC 5.7 4.3 - 11.1 K/uL  
 RBC 4.35 4.23 - 5.6 M/uL  
 HGB 14.3 13.6 - 17.2 g/dL HCT 43.7 41.1 - 50.3 % .5 (H) 79.6 - 97.8 FL  
 MCH 32.9 26.1 - 32.9 PG  
 MCHC 32.7 31.4 - 35.0 g/dL  
 RDW 12.9 11.9 - 14.6 % PLATELET 448 725 - 773 K/uL MPV 8.2 (L) 9.4 - 12.3 FL ABSOLUTE NRBC 0.00 0.0 - 0.2 K/uL  
 DF AUTOMATED NEUTROPHILS 85 (H) 43 - 78 % LYMPHOCYTES 7 (L) 13 - 44 % MONOCYTES 7 4.0 - 12.0 % EOSINOPHILS 0 (L) 0.5 - 7.8 % BASOPHILS 1 0.0 - 2.0 % IMMATURE GRANULOCYTES 0 0.0 - 5.0 %  
 ABS. NEUTROPHILS 4.9 1.7 - 8.2 K/UL  
 ABS. LYMPHOCYTES 0.4 (L) 0.5 - 4.6 K/UL  
 ABS. MONOCYTES 0.4 0.1 - 1.3 K/UL  
 ABS. EOSINOPHILS 0.0 0.0 - 0.8 K/UL  
 ABS. BASOPHILS 0.0 0.0 - 0.2 K/UL  
 ABS. IMM. GRANS. 0.0 0.0 - 0.5 K/UL METABOLIC PANEL, COMPREHENSIVE Collection Time: 12/31/18 12:23 PM  
Result Value Ref Range Sodium 137 136 - 145 mmol/L Potassium 4.7 3.5 - 5.1 mmol/L Chloride 102 98 - 107 mmol/L  
 CO2 25 21 - 32 mmol/L Anion gap 10 mmol/L Glucose 129 (H) 65 - 100 mg/dL BUN 29 (H) 8 - 23 MG/DL Creatinine 1.98 (H) 0.8 - 1.5 MG/DL  
 GFR est AA 40 (L) >60 ml/min/1.73m2 GFR est non-AA 33 ml/min/1.73m2  Calcium 9.5 8.3 - 10.4 MG/DL  
 Bilirubin, total 0.8 0.2 - 1.1 MG/DL  
 ALT (SGPT) 26 12 - 65 U/L  
 AST (SGOT) 20 15 - 37 U/L Alk. phosphatase 100 50 - 136 U/L Protein, total 7.6 g/dL Albumin 4.3 3.2 - 4.6 g/dL Globulin 3.3 2.3 - 3.5 g/dL A-G Ratio 1.3    
TROPONIN I Collection Time: 12/31/18 12:23 PM  
Result Value Ref Range Troponin-I, Qt. <0.02 (L) 0.02 - 0.05 NG/ML  
POC LACTIC ACID Collection Time: 12/31/18 12:28 PM  
Result Value Ref Range Lactic Acid (POC) 1.98 (H) 0.5 - 1.9 mmol/L INFLUENZA A & B AG (RAPID TEST) Collection Time: 12/31/18 12:45 PM  
Result Value Ref Range Influenza A Ag NEGATIVE  NEG Influenza B Ag NEGATIVE  NEG Source NASOPHARYNGEAL Imaging Casey Bell Assessment and Plan: Active Hospital Problems Diagnosis Date Noted  Acute kidney injury ([facility] [address] 12/31/2018 PLAN 
· Admit to OBS · Symptoms are likely related to volume depletion, will continue with maintenance fluids · Patient has been previously worked up for CVA with very similar presentation in the past, will order MRI, carotid Duplex, and TTE · Order UA 
· PT/OT/speech consults · Avoid nephrotoxic agents and NSAIDs · Serial BMPs · Can continue with ceftriaxone and azithromycin for now for CAP, although patient is afebrile with URI symptoms · Due to worsening confusion will order Y86, folic acid, RPR, TSH levels, it is likely that patient has mild cognitive impairment that is worsening or it may be early dementia F/E/N: fluids as above, replete electrolytes as needed, NPO for now Ppx: Lovenox for VTE Code Status: DNR/DNI (discussed with family at bedside) Disposition: Admit to OBS, likely discharge in 24-48 hours pending workup as above. Signed By: Na Zaidi DO   
 December 31, 2018

## 2018-12-31 NOTE — PROGRESS NOTES
Patient arrived to unit and placed on monitors. Dual skin assessment performed with Yobani Marsh RN. No skin breakdown or pressure ulcers noted. Allevyn placed on sacrum for prevention.

## 2018-12-31 NOTE — PROGRESS NOTES
TRANSFER - IN REPORT: 
 
Verbal report received from Estrellita RN (name) on Amilcar Valenzuela  being received from ER (unit) for routine progression of care Report consisted of patients Situation, Background, Assessment and  
Recommendations(SBAR). Information from the following report(s) SBAR, Kardex, ED Summary, Intake/Output, MAR, Recent Results and Cardiac Rhythm NSR was reviewed with the receiving nurse. Opportunity for questions and clarification was provided.

## 2018-12-31 NOTE — ED NOTES
TRANSFER - OUT REPORT: 
 
Verbal report given to MARYLIN Akbar on Damion Osuna  being transferred to [phone] for routine progression of care Report consisted of patients Situation, Background, Assessment and  
Recommendations(SBAR). Information from the following report(s) ED Summary, Intake/Output, MAR and Cardiac Rhythm NSR was reviewed with the receiving nurse. Lines:  
Peripheral IV 12/31/18 Left Antecubital (Active) Site Assessment Clean, dry, & intact 12/31/2018 12:21 PM  
Phlebitis Assessment 0 12/31/2018 12:21 PM  
Infiltration Assessment 0 12/31/2018 12:21 PM  
Dressing Status Clean, dry, & intact 12/31/2018 12:21 PM  
Hub Color/Line Status Green 12/31/2018 12:21 PM  
Alcohol Cap Used No 12/31/2018 12:21 PM  
  
 
Opportunity for questions and clarification was provided. Patient transported with: 
 Registered Nurse

## 2018-12-31 NOTE — ED TRIAGE NOTES
Patient arrives from [facility] [facility] living, with complaint of dizziness. Patient was walking down the [place]ogueira and became dizzy. Staff assisted him to his room. EMS found pressure to be 98/60. But recent one was found to be 106/60 BGL was found to be 172 mg/dL. Patient is no longer dizzy

## 2018-12-31 NOTE — PROGRESS NOTES
Dr. Tobi Manuel updated on patient condition, current vitals and inability to obtain MRI consent form from patient or patient's family. Nursing staff has left message with patient's son. Orders to administer a 500ml NS bolus at this time.

## 2018-12-31 NOTE — PROGRESS NOTES
Socioevironmental: 
SW met with patient who was resting. Patient's son Stephanie Miranda ([phone]) at the bedside provided SW with background information. Stephanie Miranda states that his father has lived in independent living at [facility] for the last year and a half. Stephanie Miranda states that his father has private caregivers who check on him in the morning and evening to ensure he's taken his medicine and to address any needs he may have.    
   
Ambulation: 
Stephanie Miranda states that his father ambulates with a walker and has had multiple times. Patient is seen by Tamiko for PT to address balance and weakness.  
  
Primary Care: 
Patient sees Dr. Guanaco Dominguez for primary care, the next appointment is on the 8th of Jan.   
  
Needs: No needs are identified at this time. Case management will continue to follow until discharge to help accommodate any that should arise. Yuridia Austin, FEIW  [facility] [email or web address]

## 2018-12-31 NOTE — ED PROVIDER NOTES
42-year-old male presents with dizziness and generalized weakness. He apparently did not eat breakfast.  He was walking back to his room from the breakfast nogueira whenever he felt like as if he would pass out. He grabbed on his door and almost fell. He was caught by a bystander. They state they could not \"pry his hand from the door\" for several minutes. Patient felt as if he was \"frozen. \"  EMS was called. Patient reported resolution of lightheadedness. His initial blood pressure was in the 48S systolic. He denies any headache, chest pain, shortness of breath, abdominal pain or recent diarrhea. He had an episode of vomiting after arrival to the emergency department. He denies any sensation of spinning. Denies any changes in medications. Past Medical History:  
Diagnosis Date  Diverticulitis  Essential (primary) hypertension  GERD (gastroesophageal reflux disease)  Hyperlipidemia  Memory loss  Osteoporosis  Pulmonary embolism ([address]   
 only one episode with simultaneous DVT. Patient and family deny hypercoaguable work up  Varicella  Volvulus of colon ([address] Past Surgical History:  
Procedure Laterality Date  HX HERNIA REPAIR    
 HX LAP CHOLECYSTECTOMY  HX TONSIL AND ADENOIDECTOMY Family History:  
Problem Relation Age of Onset  Diabetes Mother Social History Socioeconomic History  Marital status:  Spouse name: Not on file  Number of children: Not on file  Years of education: Not on file  Highest education level: Not on file Social Needs  Financial resource strain: Not on file  Food insecurity - worry: Not on file  Food insecurity - inability: Not on file  Transportation needs - medical: Not on file  Transportation needs - non-medical: Not on file Occupational History  Occupation: Industrial Management Tobacco Use  Smoking status: Former Smoker Last attempt to quit: 1980 Years since quittin.0  Smokeless tobacco: Never Used Substance and Sexual Activity  Alcohol use: Yes Alcohol/week: 3.6 oz Types: 6 Standard drinks or equivalent per week Comment: daily  Drug use: Not on file  Sexual activity: Not on file Other Topics Concern  Not on file Social History Narrative  Not on file ALLERGIES: Patient has no known allergies. Review of Systems Constitutional: Positive for fatigue. Negative for chills and fever. HENT: Negative for hearing loss. Eyes: Negative for visual disturbance. Respiratory: Negative for cough and shortness of breath. Cardiovascular: Negative for chest pain and palpitations. Gastrointestinal: Positive for nausea and vomiting. Negative for abdominal pain and diarrhea. Musculoskeletal: Negative for back pain. Skin: Negative for rash. Neurological: Positive for dizziness and light-headedness. Negative for weakness and headaches. Psychiatric/Behavioral: Negative for confusion. Vitals:  
 18 3074 BP: 113/56 Pulse: 68 Resp: 18 Temp: 97.6 °F (36.4 °C) Weight: 63.5 kg (140 lb) Height: 5' 9\" (1.753 m) Physical Exam  
Constitutional: He appears well-developed and well-nourished. HENT:  
Head: Normocephalic and atraumatic. Right Ear: External ear normal.  
Left Ear: External ear normal.  
Nose: Nose normal.  
Mouth/Throat: Oropharynx is clear and moist.  
Eyes: Conjunctivae are normal. Pupils are equal, round, and reactive to light. Right eye exhibits nystagmus. Left eye exhibits nystagmus. Neck: Normal range of motion. Neck supple. Cardiovascular: Regular rhythm, normal heart sounds and intact distal pulses. Pulmonary/Chest: Effort normal and breath sounds normal. No respiratory distress. He has no wheezes. Abdominal: Soft. Bowel sounds are normal. He exhibits no distension. There is no tenderness. Musculoskeletal: Normal range of motion. He exhibits no edema. Neurological: He is alert. Skin: Skin is warm and dry. Psychiatric: Judgment normal.  
Nursing note and vitals reviewed. MDM Number of Diagnoses or Management Options Diagnosis management comments: Parts of this document were created using dragon voice recognition software. The chart has been reviewed but errors may still be present. Patient had some shortness of breath after assisting from chair into bed with nystagmus and generalized weakness. 1:04 PM 
Hr increased from 70 to 79 with standing. BP dropped by 15. Given gentle fluids. CXR shows new infiltrate. abx and cultures ordered. Mildly elevated lactic. 
 
2:00 PM 
Family reports increased episodes of recent confusion. Will admit to hospitalist for possible MRI and treatment of pneumonia. Amount and/or Complexity of Data Reviewed Clinical lab tests: ordered and reviewed (Results for orders placed or performed during the hospital encounter of 12/31/18 -INFLUENZA A & B AG (RAPID TEST) Result                      Value             Ref Range Influenza A Ag              NEGATIVE          NEG Influenza B Ag              NEGATIVE          NEG Source NASOPHARYNGEAL 
-CBC WITH AUTOMATED DIFF Result                      Value             Ref Range WBC                         5.7               4.3 - 11.1 K* 
     RBC                         4.35              4.23 - 5.6 M* HGB                         14.3              13.6 - 17.2 * HCT                         43.7              41.1 - 50.3 % MCV                         100.5 (H)         79.6 - 97.8 * MCH                         32.9              26.1 - 32.9 * MCHC                        32.7              31.4 - 35.0 * RDW                         12.9              11.9 - 14.6 % PLATELET                    284               150 - 450 K/* MPV                         8.2 (L)           9.4 - 12.3 FL ABSOLUTE NRBC               0.00              0.0 - 0.2 K/* DF                          AUTOMATED NEUTROPHILS                 85 (H)            43 - 78 % LYMPHOCYTES                 7 (L)             13 - 44 % MONOCYTES                   7                 4.0 - 12.0 % EOSINOPHILS                 0 (L)             0.5 - 7.8 % BASOPHILS                   1                 0.0 - 2.0 % IMMATURE GRANULOCYTES       0                 0.0 - 5.0 %   
     ABS. NEUTROPHILS            4.9               1.7 - 8.2 K/* ABS. LYMPHOCYTES            0.4 (L)           0.5 - 4.6 K/* ABS. MONOCYTES              0.4               0.1 - 1.3 K/* ABS. EOSINOPHILS            0.0               0.0 - 0.8 K/* ABS. BASOPHILS              0.0               0.0 - 0.2 K/* ABS. IMM. GRANS.            0.0               0.0 - 0.5 K/* 
-METABOLIC PANEL, COMPREHENSIVE Result                      Value             Ref Range Sodium                      137               136 - 145 mm* Potassium                   4.7               3.5 - 5.1 mm* Chloride                    102               98 - 107 mmo* CO2                         25                21 - 32 mmol* Anion gap                   10                mmol/L Glucose                     129 (H)           65 - 100 mg/* BUN                         29 (H)            8 - 23 MG/DL Creatinine                  1.98 (H)          0.8 - 1.5 MG* 
     GFR est AA                  40 (L)            >60 ml/min/1* GFR est non-AA              33                ml/min/1.73m2 Calcium                     9.5               8.3 - 10.4 M*      Bilirubin, total            0.8               0.2 - 1.1 MG* 
 ALT (SGPT)                  26                12 - 65 U/L   
     AST (SGOT)                  20                15 - 37 U/L Alk. phosphatase            100               50 - 136 U/L Protein, total              7.6               g/dL Albumin                     4.3               3.2 - 4.6 g/* Globulin                    3.3               2.3 - 3.5 g/* A-G Ratio                   1.3                             
-TROPONIN I Result                      Value             Ref Range Troponin-I, Qt.             <0.02 (L)         0.02 - 0.05 * 
-POC LACTIC ACID Result                      Value             Ref Range Lactic Acid (POC)           1.98 (H)          0.5 - 1.9 mm* 
) Tests in the radiology section of CPT®: ordered and reviewed (Ct Head Wo Cont Result Date: 12/31/2018 Noncontrast head CT Clinical Indication: Acute dizziness, difficulty walking, confusion, vomiting. Technique: Noncontrast axial images were obtained through the brain. All CT scans at this location are performed using dose modulation techniques as appropriate including the following: Automated exposure control, adjustment of the MA and/or kV according to patient's size, or use of iterative reconstruction technique. Comparison: 4/25/2018 CT, 2/25/2018 MRI Findings: There is no acute intracranial hemorrhage, hydrocephalus, intra-axial mass, or mass-effect. Chronic generalized age commensurate atrophy and white matter hypodensities are again seen. There is no CT evidence of acute large artery territorial infarction or abnormal extra-axial fluid collection. The mastoid air cells and paranasal sinuses are clear where imaged. No displaced skull fractures are present. Impression: No acute intracranial abnormality. Xr Chest The [facility] Result Date: 12/31/2018 Chest portable CLINICAL INDICATION: Acute dizziness and weakness, confusion, vomiting COMPARISON: 2/24/2018 TECHNIQUE: single AP portable view chest at 12:40 PM upright FINDINGS: There is mildly increased groundglass opacity in the right base. There is no evidence of a dense consolidation, pneumothorax, pleural effusion or pulmonary edema. The mediastinal and hilar contours are stable and likely normal given technique. IMPRESSION: Right basilar mild infiltrate. ) Tests in the medicine section of CPT®: reviewed and ordered Procedures

## 2019-01-01 ENCOUNTER — HOSPICE ADMISSION (OUTPATIENT)
Dept: HOSPICE | Facility: HOSPICE | Age: 84
End: 2019-01-01
Payer: MEDICARE

## 2019-01-01 ENCOUNTER — APPOINTMENT (OUTPATIENT)
Dept: GENERAL RADIOLOGY | Age: 84
DRG: 388 | End: 2019-01-01
Attending: INTERNAL MEDICINE
Payer: MEDICARE

## 2019-01-01 ENCOUNTER — APPOINTMENT (OUTPATIENT)
Dept: CT IMAGING | Age: 84
End: 2019-01-01
Attending: EMERGENCY MEDICINE
Payer: MEDICARE

## 2019-01-01 ENCOUNTER — PATIENT OUTREACH (OUTPATIENT)
Dept: CASE MANAGEMENT | Age: 84
End: 2019-01-01

## 2019-01-01 ENCOUNTER — APPOINTMENT (OUTPATIENT)
Dept: GENERAL RADIOLOGY | Age: 84
DRG: 388 | End: 2019-01-01
Attending: SURGERY
Payer: MEDICARE

## 2019-01-01 ENCOUNTER — APPOINTMENT (OUTPATIENT)
Dept: GENERAL RADIOLOGY | Age: 84
End: 2019-01-01
Attending: EMERGENCY MEDICINE
Payer: MEDICARE

## 2019-01-01 ENCOUNTER — HOSPITAL ENCOUNTER (INPATIENT)
Age: 84
LOS: 3 days | Discharge: HOSPICE | DRG: 388 | End: 2019-03-11
Attending: EMERGENCY MEDICINE | Admitting: INTERNAL MEDICINE
Payer: MEDICARE

## 2019-01-01 ENCOUNTER — APPOINTMENT (OUTPATIENT)
Dept: CT IMAGING | Age: 84
DRG: 388 | End: 2019-01-01
Attending: EMERGENCY MEDICINE
Payer: MEDICARE

## 2019-01-01 ENCOUNTER — HOSPITAL ENCOUNTER (INPATIENT)
Age: 84
LOS: 1 days | End: 2019-03-12
Attending: INTERNAL MEDICINE | Admitting: INTERNAL MEDICINE
Payer: MEDICARE

## 2019-01-01 ENCOUNTER — APPOINTMENT (OUTPATIENT)
Dept: GENERAL RADIOLOGY | Age: 84
DRG: 388 | End: 2019-01-01
Attending: EMERGENCY MEDICINE
Payer: MEDICARE

## 2019-01-01 ENCOUNTER — HOSPITAL ENCOUNTER (OUTPATIENT)
Age: 84
Setting detail: OBSERVATION
Discharge: OTHER INSTITUTION | End: 2019-02-16
Attending: EMERGENCY MEDICINE | Admitting: INTERNAL MEDICINE
Payer: MEDICARE

## 2019-01-01 VITALS
OXYGEN SATURATION: 95 % | BODY MASS INDEX: 22.51 KG/M2 | DIASTOLIC BLOOD PRESSURE: 62 MMHG | HEART RATE: 65 BPM | WEIGHT: 152 LBS | TEMPERATURE: 97.8 F | HEIGHT: 69 IN | RESPIRATION RATE: 18 BRPM | SYSTOLIC BLOOD PRESSURE: 120 MMHG

## 2019-01-01 VITALS
HEART RATE: 74 BPM | RESPIRATION RATE: 15 BRPM | HEIGHT: 62 IN | SYSTOLIC BLOOD PRESSURE: 133 MMHG | WEIGHT: 143 LBS | BODY MASS INDEX: 26.31 KG/M2 | OXYGEN SATURATION: 97 % | TEMPERATURE: 98.2 F | DIASTOLIC BLOOD PRESSURE: 70 MMHG

## 2019-01-01 VITALS
RESPIRATION RATE: 16 BRPM | DIASTOLIC BLOOD PRESSURE: 74 MMHG | SYSTOLIC BLOOD PRESSURE: 178 MMHG | TEMPERATURE: 95.4 F | HEART RATE: 88 BPM

## 2019-01-01 VITALS
WEIGHT: 143.2 LBS | OXYGEN SATURATION: 95 % | TEMPERATURE: 97.8 F | DIASTOLIC BLOOD PRESSURE: 65 MMHG | SYSTOLIC BLOOD PRESSURE: 154 MMHG | BODY MASS INDEX: 26.35 KG/M2 | HEART RATE: 68 BPM | RESPIRATION RATE: 18 BRPM | HEIGHT: 62 IN

## 2019-01-01 DIAGNOSIS — N28.9 RENAL INSUFFICIENCY: ICD-10-CM

## 2019-01-01 DIAGNOSIS — R10.32 ABDOMINAL PAIN, LLQ (LEFT LOWER QUADRANT): Primary | ICD-10-CM

## 2019-01-01 DIAGNOSIS — R11.2 NON-INTRACTABLE VOMITING WITH NAUSEA, UNSPECIFIED VOMITING TYPE: ICD-10-CM

## 2019-01-01 DIAGNOSIS — Z51.5 ENCOUNTER FOR PALLIATIVE CARE: ICD-10-CM

## 2019-01-01 DIAGNOSIS — K59.00 CONSTIPATION, UNSPECIFIED CONSTIPATION TYPE: ICD-10-CM

## 2019-01-01 DIAGNOSIS — G93.41 ACUTE METABOLIC ENCEPHALOPATHY: ICD-10-CM

## 2019-01-01 DIAGNOSIS — K56.600 PARTIAL SMALL BOWEL OBSTRUCTION (HCC): Primary | ICD-10-CM

## 2019-01-01 DIAGNOSIS — Z51.5 HOSPICE CARE: ICD-10-CM

## 2019-01-01 LAB
ALBUMIN SERPL-MCNC: 2.9 G/DL (ref 3.2–4.6)
ALBUMIN SERPL-MCNC: 3.8 G/DL (ref 3.2–4.6)
ALBUMIN SERPL-MCNC: 4.3 G/DL (ref 3.2–4.6)
ALBUMIN/GLOB SERPL: 1 {RATIO} (ref 1.2–3.5)
ALBUMIN/GLOB SERPL: 1 {RATIO} (ref 1.2–3.5)
ALBUMIN/GLOB SERPL: 1.1 {RATIO}
ALP SERPL-CCNC: 106 U/L (ref 50–136)
ALP SERPL-CCNC: 116 U/L (ref 50–136)
ALP SERPL-CCNC: 76 U/L (ref 50–136)
ALT SERPL-CCNC: 11 U/L (ref 12–65)
ALT SERPL-CCNC: 20 U/L (ref 12–65)
ALT SERPL-CCNC: 20 U/L (ref 12–65)
ANION GAP SERPL CALC-SCNC: 11 MMOL/L (ref 7–16)
ANION GAP SERPL CALC-SCNC: 6 MMOL/L (ref 7–16)
ANION GAP SERPL CALC-SCNC: 6 MMOL/L (ref 7–16)
ANION GAP SERPL CALC-SCNC: 8 MMOL/L
ANION GAP SERPL CALC-SCNC: 8 MMOL/L
ANION GAP SERPL CALC-SCNC: 9 MMOL/L
ANION GAP SERPL CALC-SCNC: 9 MMOL/L (ref 7–16)
ANION GAP SERPL CALC-SCNC: 9 MMOL/L (ref 7–16)
APTT PPP: 29 SEC (ref 24.7–39.8)
AST SERPL-CCNC: 15 U/L (ref 15–37)
AST SERPL-CCNC: 17 U/L (ref 15–37)
AST SERPL-CCNC: 18 U/L (ref 15–37)
ATRIAL RATE: 69 BPM
ATRIAL RATE: 74 BPM
BACTERIA SPEC CULT: NORMAL
BACTERIA SPEC CULT: NORMAL
BASOPHILS # BLD: 0 K/UL (ref 0–0.2)
BASOPHILS NFR BLD: 0 % (ref 0–2)
BASOPHILS NFR BLD: 1 % (ref 0–2)
BASOPHILS NFR BLD: 1 % (ref 0–2)
BILIRUB SERPL-MCNC: 0.6 MG/DL (ref 0.2–1.1)
BILIRUB SERPL-MCNC: 0.8 MG/DL (ref 0.2–1.1)
BILIRUB SERPL-MCNC: 1 MG/DL (ref 0.2–1.1)
BUN SERPL-MCNC: 15 MG/DL (ref 8–23)
BUN SERPL-MCNC: 19 MG/DL (ref 8–23)
BUN SERPL-MCNC: 22 MG/DL (ref 8–23)
BUN SERPL-MCNC: 23 MG/DL (ref 8–23)
BUN SERPL-MCNC: 27 MG/DL (ref 8–23)
BUN SERPL-MCNC: 34 MG/DL (ref 8–23)
BUN SERPL-MCNC: 43 MG/DL (ref 8–23)
BUN SERPL-MCNC: 44 MG/DL (ref 8–23)
CALCIUM SERPL-MCNC: 10.2 MG/DL (ref 8.3–10.4)
CALCIUM SERPL-MCNC: 7.2 MG/DL (ref 8.3–10.4)
CALCIUM SERPL-MCNC: 8 MG/DL (ref 8.3–10.4)
CALCIUM SERPL-MCNC: 8.4 MG/DL (ref 8.3–10.4)
CALCIUM SERPL-MCNC: 8.4 MG/DL (ref 8.3–10.4)
CALCIUM SERPL-MCNC: 8.8 MG/DL (ref 8.3–10.4)
CALCIUM SERPL-MCNC: 9.1 MG/DL (ref 8.3–10.4)
CALCIUM SERPL-MCNC: 9.1 MG/DL (ref 8.3–10.4)
CALCULATED P AXIS, ECG09: 69 DEGREES
CALCULATED P AXIS, ECG09: 72 DEGREES
CALCULATED R AXIS, ECG10: 20 DEGREES
CALCULATED R AXIS, ECG10: 43 DEGREES
CALCULATED T AXIS, ECG11: 66 DEGREES
CALCULATED T AXIS, ECG11: 75 DEGREES
CHLORIDE SERPL-SCNC: 103 MMOL/L (ref 98–107)
CHLORIDE SERPL-SCNC: 104 MMOL/L (ref 98–107)
CHLORIDE SERPL-SCNC: 105 MMOL/L (ref 98–107)
CHLORIDE SERPL-SCNC: 106 MMOL/L (ref 98–107)
CHLORIDE SERPL-SCNC: 108 MMOL/L (ref 98–107)
CHLORIDE SERPL-SCNC: 108 MMOL/L (ref 98–107)
CHLORIDE SERPL-SCNC: 112 MMOL/L (ref 98–107)
CHLORIDE SERPL-SCNC: 95 MMOL/L (ref 98–107)
CO2 SERPL-SCNC: 22 MMOL/L (ref 21–32)
CO2 SERPL-SCNC: 23 MMOL/L (ref 21–32)
CO2 SERPL-SCNC: 27 MMOL/L (ref 21–32)
CO2 SERPL-SCNC: 27 MMOL/L (ref 21–32)
CO2 SERPL-SCNC: 28 MMOL/L (ref 21–32)
CO2 SERPL-SCNC: 29 MMOL/L (ref 21–32)
CO2 SERPL-SCNC: 30 MMOL/L (ref 21–32)
CO2 SERPL-SCNC: 31 MMOL/L (ref 21–32)
CREAT SERPL-MCNC: 1.01 MG/DL (ref 0.8–1.5)
CREAT SERPL-MCNC: 1.24 MG/DL (ref 0.8–1.5)
CREAT SERPL-MCNC: 1.35 MG/DL (ref 0.8–1.5)
CREAT SERPL-MCNC: 1.36 MG/DL (ref 0.8–1.5)
CREAT SERPL-MCNC: 1.49 MG/DL (ref 0.8–1.5)
CREAT SERPL-MCNC: 2.87 MG/DL (ref 0.8–1.5)
CREAT SERPL-MCNC: 3 MG/DL (ref 0.8–1.5)
CREAT SERPL-MCNC: 3.41 MG/DL (ref 0.8–1.5)
CREAT UR-MCNC: 51 MG/DL
DIAGNOSIS, 93000: NORMAL
DIAGNOSIS, 93000: NORMAL
DIFFERENTIAL METHOD BLD: ABNORMAL
EOSINOPHIL # BLD: 0 K/UL (ref 0–0.8)
EOSINOPHIL # BLD: 0 K/UL (ref 0–0.8)
EOSINOPHIL # BLD: 0.1 K/UL (ref 0–0.8)
EOSINOPHIL NFR BLD: 0 % (ref 0.5–7.8)
EOSINOPHIL NFR BLD: 1 % (ref 0.5–7.8)
EOSINOPHIL NFR BLD: 1 % (ref 0.5–7.8)
EOSINOPHIL NFR BLD: 2 % (ref 0.5–7.8)
ERYTHROCYTE [DISTWIDTH] IN BLOOD BY AUTOMATED COUNT: 12.6 % (ref 11.9–14.6)
ERYTHROCYTE [DISTWIDTH] IN BLOOD BY AUTOMATED COUNT: 13 % (ref 11.9–14.6)
ERYTHROCYTE [DISTWIDTH] IN BLOOD BY AUTOMATED COUNT: 13.2 % (ref 11.9–14.6)
ERYTHROCYTE [DISTWIDTH] IN BLOOD BY AUTOMATED COUNT: 13.3 % (ref 11.9–14.6)
ERYTHROCYTE [DISTWIDTH] IN BLOOD BY AUTOMATED COUNT: 13.7 % (ref 11.9–14.6)
FOLATE SERPL-MCNC: 8.6 NG/ML (ref 3.1–17.5)
GLOBULIN SER CALC-MCNC: 2.9 G/DL (ref 2.3–3.5)
GLOBULIN SER CALC-MCNC: 3.6 G/DL (ref 2.3–3.5)
GLOBULIN SER CALC-MCNC: 4.2 G/DL (ref 2.3–3.5)
GLUCOSE SERPL-MCNC: 105 MG/DL (ref 65–100)
GLUCOSE SERPL-MCNC: 115 MG/DL (ref 65–100)
GLUCOSE SERPL-MCNC: 127 MG/DL (ref 65–100)
GLUCOSE SERPL-MCNC: 144 MG/DL (ref 65–100)
GLUCOSE SERPL-MCNC: 80 MG/DL (ref 65–100)
GLUCOSE SERPL-MCNC: 83 MG/DL (ref 65–100)
GLUCOSE SERPL-MCNC: 86 MG/DL (ref 65–100)
GLUCOSE SERPL-MCNC: 90 MG/DL (ref 65–100)
HCT VFR BLD AUTO: 29.3 % (ref 41.1–50.3)
HCT VFR BLD AUTO: 32.7 % (ref 41.1–50.3)
HCT VFR BLD AUTO: 33.6 % (ref 41.1–50.3)
HCT VFR BLD AUTO: 34.6 % (ref 41.1–50.3)
HCT VFR BLD AUTO: 36.6 % (ref 41.1–50.3)
HCT VFR BLD AUTO: 37.2 % (ref 41.1–50.3)
HCT VFR BLD AUTO: 39.1 % (ref 41.1–50.3)
HCT VFR BLD AUTO: 43.2 % (ref 41.1–50.3)
HGB BLD-MCNC: 10.6 G/DL (ref 13.6–17.2)
HGB BLD-MCNC: 11.2 G/DL (ref 13.6–17.2)
HGB BLD-MCNC: 11.2 G/DL (ref 13.6–17.2)
HGB BLD-MCNC: 12 G/DL (ref 13.6–17.2)
HGB BLD-MCNC: 12.5 G/DL (ref 13.6–17.2)
HGB BLD-MCNC: 13 G/DL (ref 13.6–17.2)
HGB BLD-MCNC: 14.6 G/DL (ref 13.6–17.2)
HGB BLD-MCNC: 9.7 G/DL (ref 13.6–17.2)
IMM GRANULOCYTES # BLD AUTO: 0 K/UL (ref 0–0.5)
IMM GRANULOCYTES # BLD: 0 K/UL (ref 0–0.5)
IMM GRANULOCYTES # BLD: 0 K/UL (ref 0–0.5)
IMM GRANULOCYTES NFR BLD AUTO: 0 % (ref 0–5)
IMM GRANULOCYTES NFR BLD AUTO: 1 % (ref 0–5)
INR PPP: 1.2
LACTATE BLD-SCNC: 1.29 MMOL/L (ref 0.5–1.9)
LACTATE BLD-SCNC: 1.93 MMOL/L (ref 0.5–1.9)
LACTATE SERPL-SCNC: 0.8 MMOL/L (ref 0.4–2)
LACTATE SERPL-SCNC: 1 MMOL/L (ref 0.4–2)
LACTATE SERPL-SCNC: 1.1 MMOL/L (ref 0.4–2)
LACTATE SERPL-SCNC: 1.4 MMOL/L (ref 0.4–2)
LACTATE SERPL-SCNC: 1.5 MMOL/L (ref 0.4–2)
LACTATE SERPL-SCNC: 1.9 MMOL/L (ref 0.4–2)
LIPASE SERPL-CCNC: 141 U/L (ref 73–393)
LIPASE SERPL-CCNC: 152 U/L (ref 73–393)
LYMPHOCYTES # BLD: 0.4 K/UL (ref 0.5–4.6)
LYMPHOCYTES # BLD: 0.6 K/UL (ref 0.5–4.6)
LYMPHOCYTES # BLD: 0.6 K/UL (ref 0.5–4.6)
LYMPHOCYTES # BLD: 0.7 K/UL (ref 0.5–4.6)
LYMPHOCYTES # BLD: 0.8 K/UL (ref 0.5–4.6)
LYMPHOCYTES # BLD: 0.9 K/UL (ref 0.5–4.6)
LYMPHOCYTES # BLD: 0.9 K/UL (ref 0.5–4.6)
LYMPHOCYTES NFR BLD: 11 % (ref 13–44)
LYMPHOCYTES NFR BLD: 16 % (ref 13–44)
LYMPHOCYTES NFR BLD: 18 % (ref 13–44)
LYMPHOCYTES NFR BLD: 18 % (ref 13–44)
LYMPHOCYTES NFR BLD: 20 % (ref 13–44)
LYMPHOCYTES NFR BLD: 21 % (ref 13–44)
LYMPHOCYTES NFR BLD: 25 % (ref 13–44)
MCH RBC QN AUTO: 33.7 PG (ref 26.1–32.9)
MCH RBC QN AUTO: 33.9 PG (ref 26.1–32.9)
MCH RBC QN AUTO: 34.1 PG (ref 26.1–32.9)
MCH RBC QN AUTO: 34.2 PG (ref 26.1–32.9)
MCH RBC QN AUTO: 34.5 PG (ref 26.1–32.9)
MCH RBC QN AUTO: 34.6 PG (ref 26.1–32.9)
MCHC RBC AUTO-ENTMCNC: 32.3 G/DL (ref 31.4–35)
MCHC RBC AUTO-ENTMCNC: 32.4 G/DL (ref 31.4–35)
MCHC RBC AUTO-ENTMCNC: 32.4 G/DL (ref 31.4–35)
MCHC RBC AUTO-ENTMCNC: 33.1 G/DL (ref 31.4–35)
MCHC RBC AUTO-ENTMCNC: 33.2 G/DL (ref 31.4–35)
MCHC RBC AUTO-ENTMCNC: 33.3 G/DL (ref 31.4–35)
MCHC RBC AUTO-ENTMCNC: 33.8 G/DL (ref 31.4–35)
MCHC RBC AUTO-ENTMCNC: 34.2 G/DL (ref 31.4–35)
MCV RBC AUTO: 101.1 FL (ref 79.6–97.8)
MCV RBC AUTO: 101.2 FL (ref 79.6–97.8)
MCV RBC AUTO: 101.2 FL (ref 79.6–97.8)
MCV RBC AUTO: 101.7 FL (ref 79.6–97.8)
MCV RBC AUTO: 101.8 FL (ref 79.6–97.8)
MCV RBC AUTO: 104.5 FL (ref 79.6–97.8)
MCV RBC AUTO: 105.5 FL (ref 79.6–97.8)
MCV RBC AUTO: 106.9 FL (ref 79.6–97.8)
MM INDURATION POC: 0 MM (ref 0–5)
MM INDURATION POC: 0 MM (ref 0–5)
MONOCYTES # BLD: 0.2 K/UL (ref 0.1–1.3)
MONOCYTES # BLD: 0.2 K/UL (ref 0.1–1.3)
MONOCYTES # BLD: 0.3 K/UL (ref 0.1–1.3)
MONOCYTES # BLD: 0.4 K/UL (ref 0.1–1.3)
MONOCYTES NFR BLD: 10 % (ref 4–12)
MONOCYTES NFR BLD: 11 % (ref 4–12)
MONOCYTES NFR BLD: 6 % (ref 4–12)
MONOCYTES NFR BLD: 6 % (ref 4–12)
MONOCYTES NFR BLD: 8 % (ref 4–12)
NEUTS SEG # BLD: 1.6 K/UL (ref 1.7–8.2)
NEUTS SEG # BLD: 2.2 K/UL (ref 1.7–8.2)
NEUTS SEG # BLD: 2.6 K/UL (ref 1.7–8.2)
NEUTS SEG # BLD: 2.7 K/UL (ref 1.7–8.2)
NEUTS SEG # BLD: 2.8 K/UL (ref 1.7–8.2)
NEUTS SEG # BLD: 3.1 K/UL (ref 1.7–8.2)
NEUTS SEG # BLD: 4.8 K/UL (ref 1.7–8.2)
NEUTS SEG NFR BLD: 64 % (ref 43–78)
NEUTS SEG NFR BLD: 70 % (ref 43–78)
NEUTS SEG NFR BLD: 70 % (ref 43–78)
NEUTS SEG NFR BLD: 71 % (ref 43–78)
NEUTS SEG NFR BLD: 71 % (ref 43–78)
NEUTS SEG NFR BLD: 72 % (ref 43–78)
NEUTS SEG NFR BLD: 83 % (ref 43–78)
NRBC # BLD: 0 K/UL (ref 0–0.2)
P-R INTERVAL, ECG05: 270 MS
P-R INTERVAL, ECG05: 274 MS
PLATELET # BLD AUTO: 154 K/UL (ref 150–450)
PLATELET # BLD AUTO: 167 K/UL (ref 150–450)
PLATELET # BLD AUTO: 169 K/UL (ref 150–450)
PLATELET # BLD AUTO: 198 K/UL (ref 150–450)
PLATELET # BLD AUTO: 205 K/UL (ref 150–450)
PLATELET # BLD AUTO: 215 K/UL (ref 150–450)
PLATELET # BLD AUTO: 237 K/UL (ref 150–450)
PLATELET # BLD AUTO: 275 K/UL (ref 150–450)
PMV BLD AUTO: 8.3 FL (ref 9.4–12.3)
PMV BLD AUTO: 8.5 FL (ref 9.4–12.3)
PMV BLD AUTO: 8.7 FL (ref 9.4–12.3)
PMV BLD AUTO: 8.7 FL (ref 9.4–12.3)
PMV BLD AUTO: 8.8 FL (ref 9.4–12.3)
PMV BLD AUTO: 9 FL (ref 9.4–12.3)
PMV BLD AUTO: 9 FL (ref 9.4–12.3)
PMV BLD AUTO: 9.3 FL (ref 9.4–12.3)
POTASSIUM SERPL-SCNC: 3.8 MMOL/L (ref 3.5–5.1)
POTASSIUM SERPL-SCNC: 3.9 MMOL/L (ref 3.5–5.1)
POTASSIUM SERPL-SCNC: 4 MMOL/L (ref 3.5–5.1)
POTASSIUM SERPL-SCNC: 4.5 MMOL/L (ref 3.5–5.1)
POTASSIUM SERPL-SCNC: 4.8 MMOL/L (ref 3.5–5.1)
POTASSIUM SERPL-SCNC: 4.9 MMOL/L (ref 3.5–5.1)
PPD POC: NORMAL NEGATIVE
PPD POC: NORMAL NEGATIVE
PROT SERPL-MCNC: 5.8 G/DL (ref 6.3–8.2)
PROT SERPL-MCNC: 7.4 G/DL
PROT SERPL-MCNC: 8.5 G/DL (ref 6.3–8.2)
PROTHROMBIN TIME: 15 SEC (ref 11.7–14.5)
Q-T INTERVAL, ECG07: 360 MS
Q-T INTERVAL, ECG07: 376 MS
QRS DURATION, ECG06: 84 MS
QRS DURATION, ECG06: 86 MS
QTC CALCULATION (BEZET), ECG08: 399 MS
QTC CALCULATION (BEZET), ECG08: 402 MS
RBC # BLD AUTO: 2.88 M/UL (ref 4.23–5.6)
RBC # BLD AUTO: 3.06 M/UL (ref 4.23–5.6)
RBC # BLD AUTO: 3.28 M/UL (ref 4.23–5.6)
RBC # BLD AUTO: 3.32 M/UL (ref 4.23–5.6)
RBC # BLD AUTO: 3.56 M/UL (ref 4.23–5.6)
RBC # BLD AUTO: 3.62 M/UL (ref 4.23–5.6)
RBC # BLD AUTO: 3.84 M/UL (ref 4.23–5.6)
RBC # BLD AUTO: 4.27 M/UL (ref 4.23–5.6)
RPR SER QL: NONREACTIVE
SERVICE CMNT-IMP: NORMAL
SERVICE CMNT-IMP: NORMAL
SODIUM SERPL-SCNC: 135 MMOL/L (ref 136–145)
SODIUM SERPL-SCNC: 139 MMOL/L (ref 136–145)
SODIUM SERPL-SCNC: 140 MMOL/L (ref 136–145)
SODIUM SERPL-SCNC: 141 MMOL/L (ref 136–145)
SODIUM SERPL-SCNC: 142 MMOL/L (ref 136–145)
SODIUM SERPL-SCNC: 143 MMOL/L (ref 136–145)
SODIUM UR-SCNC: 166 MMOL/L
TSH SERPL DL<=0.005 MIU/L-ACNC: 0.47 UIU/ML
VENTRICULAR RATE, ECG03: 69 BPM
VENTRICULAR RATE, ECG03: 74 BPM
VIT B12 SERPL-MCNC: 152 PG/ML (ref 193–986)
WBC # BLD AUTO: 2.3 K/UL (ref 4.3–11.1)
WBC # BLD AUTO: 3.5 K/UL (ref 4.3–11.1)
WBC # BLD AUTO: 3.6 K/UL (ref 4.3–11.1)
WBC # BLD AUTO: 3.9 K/UL (ref 4.3–11.1)
WBC # BLD AUTO: 4 K/UL (ref 4.3–11.1)
WBC # BLD AUTO: 4.4 K/UL (ref 4.3–11.1)
WBC # BLD AUTO: 5.7 K/UL (ref 4.3–11.1)
WBC # BLD AUTO: 7 K/UL (ref 4.3–11.1)

## 2019-01-01 PROCEDURE — G8980 MOBILITY D/C STATUS: HCPCS

## 2019-01-01 PROCEDURE — 85025 COMPLETE CBC W/AUTO DIFF WBC: CPT

## 2019-01-01 PROCEDURE — 36415 COLL VENOUS BLD VENIPUNCTURE: CPT

## 2019-01-01 PROCEDURE — 74011250637 HC RX REV CODE- 250/637: Performed by: INTERNAL MEDICINE

## 2019-01-01 PROCEDURE — 96374 THER/PROPH/DIAG INJ IV PUSH: CPT | Performed by: EMERGENCY MEDICINE

## 2019-01-01 PROCEDURE — 77030020263 HC SOL INJ SOD CL0.9% LFCR 1000ML

## 2019-01-01 PROCEDURE — 74011250636 HC RX REV CODE- 250/636: Performed by: INTERNAL MEDICINE

## 2019-01-01 PROCEDURE — 71045 X-RAY EXAM CHEST 1 VIEW: CPT

## 2019-01-01 PROCEDURE — 99218 HC RM OBSERVATION: CPT

## 2019-01-01 PROCEDURE — 74011000250 HC RX REV CODE- 250: Performed by: NURSE PRACTITIONER

## 2019-01-01 PROCEDURE — 70450 CT HEAD/BRAIN W/O DYE: CPT

## 2019-01-01 PROCEDURE — 96375 TX/PRO/DX INJ NEW DRUG ADDON: CPT | Performed by: EMERGENCY MEDICINE

## 2019-01-01 PROCEDURE — 83605 ASSAY OF LACTIC ACID: CPT

## 2019-01-01 PROCEDURE — 93005 ELECTROCARDIOGRAM TRACING: CPT | Performed by: EMERGENCY MEDICINE

## 2019-01-01 PROCEDURE — 0T9B70Z DRAINAGE OF BLADDER WITH DRAINAGE DEVICE, VIA NATURAL OR ARTIFICIAL OPENING: ICD-10-PCS | Performed by: UROLOGY

## 2019-01-01 PROCEDURE — C8929 TTE W OR WO FOL WCON,DOPPLER: HCPCS

## 2019-01-01 PROCEDURE — 80048 BASIC METABOLIC PNL TOTAL CA: CPT

## 2019-01-01 PROCEDURE — 97161 PT EVAL LOW COMPLEX 20 MIN: CPT

## 2019-01-01 PROCEDURE — 97530 THERAPEUTIC ACTIVITIES: CPT

## 2019-01-01 PROCEDURE — G8979 MOBILITY GOAL STATUS: HCPCS

## 2019-01-01 PROCEDURE — 74022 RADEX COMPL AQT ABD SERIES: CPT

## 2019-01-01 PROCEDURE — 97166 OT EVAL MOD COMPLEX 45 MIN: CPT

## 2019-01-01 PROCEDURE — 51702 INSERT TEMP BLADDER CATH: CPT | Performed by: EMERGENCY MEDICINE

## 2019-01-01 PROCEDURE — 77030011256 HC DRSG MEPILEX <16IN NO BORD MOLN -A

## 2019-01-01 PROCEDURE — 74011000250 HC RX REV CODE- 250: Performed by: INTERNAL MEDICINE

## 2019-01-01 PROCEDURE — C9113 INJ PANTOPRAZOLE SODIUM, VIA: HCPCS | Performed by: INTERNAL MEDICINE

## 2019-01-01 PROCEDURE — 74177 CT ABD & PELVIS W/CONTRAST: CPT

## 2019-01-01 PROCEDURE — 0656 HSPC GENERAL INPATIENT

## 2019-01-01 PROCEDURE — 74011000250 HC RX REV CODE- 250: Performed by: EMERGENCY MEDICINE

## 2019-01-01 PROCEDURE — 85610 PROTHROMBIN TIME: CPT

## 2019-01-01 PROCEDURE — G8997 SWALLOW GOAL STATUS: HCPCS

## 2019-01-01 PROCEDURE — 74011250636 HC RX REV CODE- 250/636: Performed by: EMERGENCY MEDICINE

## 2019-01-01 PROCEDURE — 96376 TX/PRO/DX INJ SAME DRUG ADON: CPT

## 2019-01-01 PROCEDURE — 86592 SYPHILIS TEST NON-TREP QUAL: CPT

## 2019-01-01 PROCEDURE — 74011250636 HC RX REV CODE- 250/636: Performed by: NURSE PRACTITIONER

## 2019-01-01 PROCEDURE — 80053 COMPREHEN METABOLIC PANEL: CPT

## 2019-01-01 PROCEDURE — 51798 US URINE CAPACITY MEASURE: CPT

## 2019-01-01 PROCEDURE — 74011636320 HC RX REV CODE- 636/320: Performed by: EMERGENCY MEDICINE

## 2019-01-01 PROCEDURE — 85027 COMPLETE CBC AUTOMATED: CPT

## 2019-01-01 PROCEDURE — 74011000258 HC RX REV CODE- 258: Performed by: EMERGENCY MEDICINE

## 2019-01-01 PROCEDURE — 76450000000

## 2019-01-01 PROCEDURE — 96361 HYDRATE IV INFUSION ADD-ON: CPT

## 2019-01-01 PROCEDURE — 97162 PT EVAL MOD COMPLEX 30 MIN: CPT

## 2019-01-01 PROCEDURE — 84300 ASSAY OF URINE SODIUM: CPT

## 2019-01-01 PROCEDURE — 82607 VITAMIN B-12: CPT

## 2019-01-01 PROCEDURE — 99284 EMERGENCY DEPT VISIT MOD MDM: CPT | Performed by: EMERGENCY MEDICINE

## 2019-01-01 PROCEDURE — 74011250636 HC RX REV CODE- 250/636

## 2019-01-01 PROCEDURE — 74011250637 HC RX REV CODE- 250/637: Performed by: HOSPITALIST

## 2019-01-01 PROCEDURE — 65270000029 HC RM PRIVATE

## 2019-01-01 PROCEDURE — 74011000302 HC RX REV CODE- 302: Performed by: INTERNAL MEDICINE

## 2019-01-01 PROCEDURE — 96376 TX/PRO/DX INJ SAME DRUG ADON: CPT | Performed by: EMERGENCY MEDICINE

## 2019-01-01 PROCEDURE — 99285 EMERGENCY DEPT VISIT HI MDM: CPT | Performed by: EMERGENCY MEDICINE

## 2019-01-01 PROCEDURE — 83690 ASSAY OF LIPASE: CPT

## 2019-01-01 PROCEDURE — 86580 TB INTRADERMAL TEST: CPT | Performed by: INTERNAL MEDICINE

## 2019-01-01 PROCEDURE — 74011250636 HC RX REV CODE- 250/636: Performed by: HOSPITALIST

## 2019-01-01 PROCEDURE — G8996 SWALLOW CURRENT STATUS: HCPCS

## 2019-01-01 PROCEDURE — 77030005518 HC CATH URETH FOL 2W BARD -B: Performed by: EMERGENCY MEDICINE

## 2019-01-01 PROCEDURE — 96361 HYDRATE IV INFUSION ADD-ON: CPT | Performed by: EMERGENCY MEDICINE

## 2019-01-01 PROCEDURE — 3336500001 HSPC ELECTION

## 2019-01-01 PROCEDURE — G8988 SELF CARE GOAL STATUS: HCPCS

## 2019-01-01 PROCEDURE — G8978 MOBILITY CURRENT STATUS: HCPCS

## 2019-01-01 PROCEDURE — 77030020255 HC SOL INJ LR 1000ML BG

## 2019-01-01 PROCEDURE — 92610 EVALUATE SWALLOWING FUNCTION: CPT

## 2019-01-01 PROCEDURE — 97535 SELF CARE MNGMENT TRAINING: CPT

## 2019-01-01 PROCEDURE — 82570 ASSAY OF URINE CREATININE: CPT

## 2019-01-01 PROCEDURE — 74176 CT ABD & PELVIS W/O CONTRAST: CPT

## 2019-01-01 PROCEDURE — 74018 RADEX ABDOMEN 1 VIEW: CPT

## 2019-01-01 PROCEDURE — 84443 ASSAY THYROID STIM HORMONE: CPT

## 2019-01-01 PROCEDURE — 65660000000 HC RM CCU STEPDOWN

## 2019-01-01 PROCEDURE — G8998 SWALLOW D/C STATUS: HCPCS

## 2019-01-01 PROCEDURE — 82746 ASSAY OF FOLIC ACID SERUM: CPT

## 2019-01-01 PROCEDURE — 77030027688 HC DRSG MEPILEX 16-48IN NO BORD MOLN -A

## 2019-01-01 PROCEDURE — G8989 SELF CARE D/C STATUS: HCPCS

## 2019-01-01 PROCEDURE — 97165 OT EVAL LOW COMPLEX 30 MIN: CPT

## 2019-01-01 PROCEDURE — G8987 SELF CARE CURRENT STATUS: HCPCS

## 2019-01-01 PROCEDURE — 77010033678 HC OXYGEN DAILY

## 2019-01-01 PROCEDURE — 99223 1ST HOSP IP/OBS HIGH 75: CPT | Performed by: NURSE PRACTITIONER

## 2019-01-01 PROCEDURE — 96375 TX/PRO/DX INJ NEW DRUG ADDON: CPT

## 2019-01-01 PROCEDURE — 85730 THROMBOPLASTIN TIME PARTIAL: CPT

## 2019-01-01 PROCEDURE — 97116 GAIT TRAINING THERAPY: CPT

## 2019-01-01 RX ORDER — BUPROPION HYDROCHLORIDE 150 MG/1
150 TABLET ORAL
Status: DISCONTINUED | OUTPATIENT
Start: 2019-01-01 | End: 2019-01-01

## 2019-01-01 RX ORDER — LORAZEPAM 2 MG/ML
1 INJECTION INTRAMUSCULAR
Status: DISCONTINUED | OUTPATIENT
Start: 2019-01-01 | End: 2019-03-13 | Stop reason: HOSPADM

## 2019-01-01 RX ORDER — POLYETHYLENE GLYCOL 3350 17 G/17G
17 POWDER, FOR SOLUTION ORAL DAILY
Status: DISCONTINUED | OUTPATIENT
Start: 2019-01-01 | End: 2019-01-01 | Stop reason: ALTCHOICE

## 2019-01-01 RX ORDER — MORPHINE SULFATE 2 MG/ML
1 INJECTION, SOLUTION INTRAMUSCULAR; INTRAVENOUS
Status: DISCONTINUED | OUTPATIENT
Start: 2019-01-01 | End: 2019-01-01 | Stop reason: HOSPADM

## 2019-01-01 RX ORDER — SODIUM CHLORIDE 0.9 % (FLUSH) 0.9 %
3 SYRINGE (ML) INJECTION EVERY 12 HOURS
Status: DISCONTINUED | OUTPATIENT
Start: 2019-01-01 | End: 2019-03-13 | Stop reason: HOSPADM

## 2019-01-01 RX ORDER — GLYCOPYRROLATE 0.2 MG/ML
0.2 INJECTION INTRAMUSCULAR; INTRAVENOUS
Status: DISCONTINUED | OUTPATIENT
Start: 2019-01-01 | End: 2019-03-13 | Stop reason: HOSPADM

## 2019-01-01 RX ORDER — HALOPERIDOL 5 MG/ML
2 INJECTION INTRAMUSCULAR
Status: DISCONTINUED | OUTPATIENT
Start: 2019-01-01 | End: 2019-03-13 | Stop reason: HOSPADM

## 2019-01-01 RX ORDER — MORPHINE SULFATE 4 MG/ML
2 INJECTION INTRAVENOUS ONCE
Status: COMPLETED | OUTPATIENT
Start: 2019-01-01 | End: 2019-01-01

## 2019-01-01 RX ORDER — RISPERIDONE 0.5 MG/1
0.5 TABLET, FILM COATED ORAL EVERY 12 HOURS
Status: DISCONTINUED | OUTPATIENT
Start: 2019-01-01 | End: 2019-01-01

## 2019-01-01 RX ORDER — LIDOCAINE HYDROCHLORIDE 20 MG/ML
JELLY TOPICAL ONCE
Status: COMPLETED | OUTPATIENT
Start: 2019-01-01 | End: 2019-01-01

## 2019-01-01 RX ORDER — CEFDINIR 300 MG/1
300 CAPSULE ORAL 2 TIMES DAILY
Qty: 10 CAP | Refills: 0 | Status: SHIPPED | OUTPATIENT
Start: 2019-01-01 | End: 2019-01-01

## 2019-01-01 RX ORDER — LORAZEPAM 2 MG/ML
1 INJECTION INTRAMUSCULAR
Qty: 1 VIAL | Refills: 0 | Status: SHIPPED
Start: 2019-01-01 | End: 2019-03-15

## 2019-01-01 RX ORDER — HEPARIN SODIUM 5000 [USP'U]/ML
5000 INJECTION, SOLUTION INTRAVENOUS; SUBCUTANEOUS EVERY 8 HOURS
Status: DISCONTINUED | OUTPATIENT
Start: 2019-01-01 | End: 2019-01-01

## 2019-01-01 RX ORDER — SODIUM CHLORIDE 9 MG/ML
150 INJECTION, SOLUTION INTRAVENOUS CONTINUOUS
Status: DISPENSED | OUTPATIENT
Start: 2019-01-01 | End: 2019-01-01

## 2019-01-01 RX ORDER — ONDANSETRON 2 MG/ML
4 INJECTION INTRAMUSCULAR; INTRAVENOUS
Status: DISCONTINUED | OUTPATIENT
Start: 2019-01-01 | End: 2019-01-01 | Stop reason: HOSPADM

## 2019-01-01 RX ORDER — MORPHINE SULFATE 2 MG/ML
2 INJECTION, SOLUTION INTRAMUSCULAR; INTRAVENOUS
Status: DISCONTINUED | OUTPATIENT
Start: 2019-01-01 | End: 2019-03-13 | Stop reason: HOSPADM

## 2019-01-01 RX ORDER — SODIUM CHLORIDE 9 MG/ML
75 INJECTION, SOLUTION INTRAVENOUS CONTINUOUS
Status: DISCONTINUED | OUTPATIENT
Start: 2019-01-01 | End: 2019-01-01 | Stop reason: HOSPADM

## 2019-01-01 RX ORDER — SODIUM CHLORIDE 9 MG/ML
75 INJECTION, SOLUTION INTRAVENOUS CONTINUOUS
Status: DISCONTINUED | OUTPATIENT
Start: 2019-01-01 | End: 2019-01-01

## 2019-01-01 RX ORDER — PANTOPRAZOLE SODIUM 40 MG/1
40 TABLET, DELAYED RELEASE ORAL
Status: DISCONTINUED | OUTPATIENT
Start: 2019-01-01 | End: 2019-01-01 | Stop reason: HOSPADM

## 2019-01-01 RX ORDER — POLYETHYLENE GLYCOL 3350 17 G/17G
17 POWDER, FOR SOLUTION ORAL DAILY
Status: DISCONTINUED | OUTPATIENT
Start: 2019-01-01 | End: 2019-01-01

## 2019-01-01 RX ORDER — POLYETHYLENE GLYCOL 3350 17 G/17G
17 POWDER, FOR SOLUTION ORAL 2 TIMES DAILY
Qty: 60 PACKET | Refills: 0 | Status: SHIPPED | OUTPATIENT
Start: 2019-01-01 | End: 2019-01-01

## 2019-01-01 RX ORDER — AMOXICILLIN 250 MG
1 CAPSULE ORAL
Status: COMPLETED | OUTPATIENT
Start: 2019-01-01 | End: 2019-01-01

## 2019-01-01 RX ORDER — POLYETHYLENE GLYCOL 3350 17 G/17G
17 POWDER, FOR SOLUTION ORAL 2 TIMES DAILY
Status: DISCONTINUED | OUTPATIENT
Start: 2019-01-01 | End: 2019-01-01

## 2019-01-01 RX ORDER — AZITHROMYCIN 500 MG/1
500 TABLET, FILM COATED ORAL SEE ADMIN INSTRUCTIONS
Qty: 1 TAB | Refills: 0 | Status: SHIPPED | OUTPATIENT
Start: 2019-01-01 | End: 2019-01-01

## 2019-01-01 RX ORDER — ACETAMINOPHEN 325 MG/1
650 TABLET ORAL
Qty: 60 TAB | Refills: 0 | Status: SHIPPED
Start: 2019-01-01

## 2019-01-01 RX ORDER — SODIUM CHLORIDE 0.9 % (FLUSH) 0.9 %
5-40 SYRINGE (ML) INJECTION EVERY 8 HOURS
Status: DISCONTINUED | OUTPATIENT
Start: 2019-01-01 | End: 2019-01-01

## 2019-01-01 RX ORDER — POLYETHYLENE GLYCOL 3350 17 G/17G
119 POWDER, FOR SOLUTION ORAL ONCE
Status: COMPLETED | OUTPATIENT
Start: 2019-01-01 | End: 2019-01-01

## 2019-01-01 RX ORDER — LORAZEPAM 2 MG/ML
1 INJECTION INTRAMUSCULAR
Status: DISCONTINUED | OUTPATIENT
Start: 2019-01-01 | End: 2019-01-01 | Stop reason: HOSPADM

## 2019-01-01 RX ORDER — SODIUM CHLORIDE 0.9 % (FLUSH) 0.9 %
3 SYRINGE (ML) INJECTION AS NEEDED
Status: DISCONTINUED | OUTPATIENT
Start: 2019-01-01 | End: 2019-03-13 | Stop reason: HOSPADM

## 2019-01-01 RX ORDER — HALOPERIDOL 5 MG/ML
INJECTION INTRAMUSCULAR
Status: COMPLETED
Start: 2019-01-01 | End: 2019-01-01

## 2019-01-01 RX ORDER — AMOXICILLIN 250 MG
1 CAPSULE ORAL DAILY
Qty: 60 TAB | Refills: 0 | Status: SHIPPED | OUTPATIENT
Start: 2019-01-01 | End: 2019-01-01

## 2019-01-01 RX ORDER — MAGNESIUM CITRATE
296 SOLUTION, ORAL ORAL
Status: COMPLETED | OUTPATIENT
Start: 2019-01-01 | End: 2019-01-01

## 2019-01-01 RX ORDER — KETOROLAC TROMETHAMINE 30 MG/ML
15 INJECTION, SOLUTION INTRAMUSCULAR; INTRAVENOUS
Status: COMPLETED | OUTPATIENT
Start: 2019-01-01 | End: 2019-01-01

## 2019-01-01 RX ORDER — GUAIFENESIN 100 MG/5ML
81 LIQUID (ML) ORAL DAILY
Status: DISCONTINUED | OUTPATIENT
Start: 2019-01-01 | End: 2019-01-01 | Stop reason: HOSPADM

## 2019-01-01 RX ORDER — AZITHROMYCIN 250 MG/1
500 TABLET, FILM COATED ORAL SEE ADMIN INSTRUCTIONS
Qty: 6 TAB | Refills: 0 | Status: SHIPPED | OUTPATIENT
Start: 2019-01-01 | End: 2019-01-01 | Stop reason: SDUPTHER

## 2019-01-01 RX ORDER — SODIUM CHLORIDE 0.9 % (FLUSH) 0.9 %
5-40 SYRINGE (ML) INJECTION EVERY 8 HOURS
Status: DISCONTINUED | OUTPATIENT
Start: 2019-01-01 | End: 2019-01-01 | Stop reason: HOSPADM

## 2019-01-01 RX ORDER — MORPHINE SULFATE 4 MG/ML
1 INJECTION INTRAVENOUS ONCE
Status: COMPLETED | OUTPATIENT
Start: 2019-01-01 | End: 2019-01-01

## 2019-01-01 RX ORDER — CYANOCOBALAMIN 1000 UG/ML
1000 INJECTION, SOLUTION INTRAMUSCULAR; SUBCUTANEOUS ONCE
Status: COMPLETED | OUTPATIENT
Start: 2019-01-01 | End: 2019-01-01

## 2019-01-01 RX ORDER — ACETAMINOPHEN 650 MG/1
650 SUPPOSITORY RECTAL
Status: DISCONTINUED | OUTPATIENT
Start: 2019-01-01 | End: 2019-03-13 | Stop reason: HOSPADM

## 2019-01-01 RX ORDER — BUPROPION HYDROCHLORIDE 150 MG/1
150 TABLET, EXTENDED RELEASE ORAL DAILY
Status: DISCONTINUED | OUTPATIENT
Start: 2019-01-01 | End: 2019-01-01

## 2019-01-01 RX ORDER — FACIAL-BODY WIPES
10 EACH TOPICAL
Qty: 30 SUPPOSITORY | Refills: 0 | Status: SHIPPED | OUTPATIENT
Start: 2019-01-01

## 2019-01-01 RX ORDER — NALOXONE HYDROCHLORIDE 0.4 MG/ML
0.4 INJECTION, SOLUTION INTRAMUSCULAR; INTRAVENOUS; SUBCUTANEOUS AS NEEDED
Qty: 1 ML | Refills: 0 | Status: SHIPPED
Start: 2019-01-01

## 2019-01-01 RX ORDER — LANOLIN ALCOHOL/MO/W.PET/CERES
500 CREAM (GRAM) TOPICAL DAILY
Status: DISCONTINUED | OUTPATIENT
Start: 2019-01-01 | End: 2019-01-01 | Stop reason: HOSPADM

## 2019-01-01 RX ORDER — ACETAMINOPHEN 325 MG/1
650 TABLET ORAL
Status: DISCONTINUED | OUTPATIENT
Start: 2019-01-01 | End: 2019-01-01 | Stop reason: HOSPADM

## 2019-01-01 RX ORDER — HALOPERIDOL 5 MG/ML
2 INJECTION INTRAMUSCULAR ONCE
Status: COMPLETED | OUTPATIENT
Start: 2019-01-01 | End: 2019-01-01

## 2019-01-01 RX ORDER — LANOLIN ALCOHOL/MO/W.PET/CERES
500 CREAM (GRAM) TOPICAL DAILY
Qty: 30 TAB | Refills: 1 | Status: SHIPPED | OUTPATIENT
Start: 2019-01-01 | End: 2019-01-01

## 2019-01-01 RX ORDER — ONDANSETRON 2 MG/ML
4 INJECTION INTRAMUSCULAR; INTRAVENOUS
Status: COMPLETED | OUTPATIENT
Start: 2019-01-01 | End: 2019-01-01

## 2019-01-01 RX ORDER — MORPHINE SULFATE 2 MG/ML
1 INJECTION, SOLUTION INTRAMUSCULAR; INTRAVENOUS
Qty: 24 ML | Refills: 0 | Status: SHIPPED
Start: 2019-01-01 | End: 2019-03-15

## 2019-01-01 RX ORDER — SODIUM CHLORIDE 0.9 % (FLUSH) 0.9 %
10 SYRINGE (ML) INJECTION
Status: COMPLETED | OUTPATIENT
Start: 2019-01-01 | End: 2019-01-01

## 2019-01-01 RX ORDER — BUPROPION HYDROCHLORIDE 150 MG/1
150 TABLET, EXTENDED RELEASE ORAL 2 TIMES DAILY
Status: DISCONTINUED | OUTPATIENT
Start: 2019-01-01 | End: 2019-01-01 | Stop reason: HOSPADM

## 2019-01-01 RX ORDER — HYDROCODONE BITARTRATE AND ACETAMINOPHEN 5; 325 MG/1; MG/1
1 TABLET ORAL
Status: DISCONTINUED | OUTPATIENT
Start: 2019-01-01 | End: 2019-01-01 | Stop reason: HOSPADM

## 2019-01-01 RX ORDER — SODIUM CHLORIDE 0.9 % (FLUSH) 0.9 %
5-40 SYRINGE (ML) INJECTION AS NEEDED
Status: DISCONTINUED | OUTPATIENT
Start: 2019-01-01 | End: 2019-01-01 | Stop reason: HOSPADM

## 2019-01-01 RX ORDER — ATORVASTATIN CALCIUM 10 MG/1
20 TABLET, FILM COATED ORAL
Status: DISCONTINUED | OUTPATIENT
Start: 2019-01-01 | End: 2019-01-01 | Stop reason: HOSPADM

## 2019-01-01 RX ORDER — HYDROCODONE BITARTRATE AND ACETAMINOPHEN 5; 325 MG/1; MG/1
1 TABLET ORAL
Qty: 24 TAB | Refills: 0 | Status: SHIPPED
Start: 2019-01-01 | End: 2019-03-14

## 2019-01-01 RX ORDER — MORPHINE SULFATE 4 MG/ML
1 INJECTION INTRAVENOUS
Status: DISCONTINUED | OUTPATIENT
Start: 2019-01-01 | End: 2019-01-01 | Stop reason: HOSPADM

## 2019-01-01 RX ORDER — NALOXONE HYDROCHLORIDE 0.4 MG/ML
0.4 INJECTION, SOLUTION INTRAMUSCULAR; INTRAVENOUS; SUBCUTANEOUS AS NEEDED
Status: DISCONTINUED | OUTPATIENT
Start: 2019-01-01 | End: 2019-01-01 | Stop reason: HOSPADM

## 2019-01-01 RX ADMIN — LIDOCAINE HYDROCHLORIDE: 20 JELLY TOPICAL at 16:55

## 2019-01-01 RX ADMIN — HALOPERIDOL LACTATE 2 MG: 5 INJECTION INTRAMUSCULAR at 12:20

## 2019-01-01 RX ADMIN — POLYETHYLENE GLYCOL 3350 17 G: 17 POWDER, FOR SOLUTION ORAL at 13:10

## 2019-01-01 RX ADMIN — SODIUM CHLORIDE 10 ML: 9 INJECTION, SOLUTION INTRAMUSCULAR; INTRAVENOUS; SUBCUTANEOUS at 05:38

## 2019-01-01 RX ADMIN — SODIUM CHLORIDE, PRESERVATIVE FREE 3 ML: 5 INJECTION INTRAVENOUS at 13:37

## 2019-01-01 RX ADMIN — Medication 10 ML: at 18:25

## 2019-01-01 RX ADMIN — HEPARIN SODIUM 5000 UNITS: 5000 INJECTION INTRAVENOUS; SUBCUTANEOUS at 02:14

## 2019-01-01 RX ADMIN — HALOPERIDOL LACTATE 2 MG: 5 INJECTION INTRAMUSCULAR at 20:09

## 2019-01-01 RX ADMIN — CYANOCOBALAMIN TAB 1000 MCG 500 MCG: 1000 TAB at 08:13

## 2019-01-01 RX ADMIN — BUPROPION HYDROCHLORIDE 150 MG: 150 TABLET, FILM COATED, EXTENDED RELEASE ORAL at 08:19

## 2019-01-01 RX ADMIN — SODIUM CHLORIDE, PRESERVATIVE FREE 3 ML: 5 INJECTION INTRAVENOUS at 13:47

## 2019-01-01 RX ADMIN — CYANOCOBALAMIN TAB 1000 MCG 500 MCG: 1000 TAB at 08:49

## 2019-01-01 RX ADMIN — SODIUM CHLORIDE 10 ML: 9 INJECTION, SOLUTION INTRAMUSCULAR; INTRAVENOUS; SUBCUTANEOUS at 05:39

## 2019-01-01 RX ADMIN — BUPROPION HYDROCHLORIDE 150 MG: 150 TABLET, FILM COATED, EXTENDED RELEASE ORAL at 08:49

## 2019-01-01 RX ADMIN — SODIUM CHLORIDE 100 ML/HR: 900 INJECTION, SOLUTION INTRAVENOUS at 17:37

## 2019-01-01 RX ADMIN — KETOROLAC TROMETHAMINE 15 MG: 30 INJECTION, SOLUTION INTRAMUSCULAR at 22:51

## 2019-01-01 RX ADMIN — SODIUM CHLORIDE 10 ML: 9 INJECTION, SOLUTION INTRAMUSCULAR; INTRAVENOUS; SUBCUTANEOUS at 02:15

## 2019-01-01 RX ADMIN — LORAZEPAM 1 MG: 2 INJECTION INTRAMUSCULAR; INTRAVENOUS at 04:12

## 2019-01-01 RX ADMIN — TUBERCULIN PURIFIED PROTEIN DERIVATIVE 5 UNITS: 5 INJECTION, SOLUTION INTRADERMAL at 18:22

## 2019-01-01 RX ADMIN — ATORVASTATIN CALCIUM 20 MG: 10 TABLET, FILM COATED ORAL at 21:48

## 2019-01-01 RX ADMIN — Medication 10 ML: at 05:03

## 2019-01-01 RX ADMIN — PANTOPRAZOLE SODIUM 40 MG: 40 TABLET, DELAYED RELEASE ORAL at 08:19

## 2019-01-01 RX ADMIN — HALOPERIDOL LACTATE 2 MG: 5 INJECTION INTRAMUSCULAR at 13:37

## 2019-01-01 RX ADMIN — SODIUM CHLORIDE 100 ML/HR: 900 INJECTION, SOLUTION INTRAVENOUS at 09:06

## 2019-01-01 RX ADMIN — DIATRIZOATE MEGLUMINE AND DIATRIZOATE SODIUM 15 ML: 660; 100 LIQUID ORAL; RECTAL at 13:47

## 2019-01-01 RX ADMIN — Medication 10 ML: at 22:24

## 2019-01-01 RX ADMIN — SODIUM CHLORIDE, PRESERVATIVE FREE 3 ML: 5 INJECTION INTRAVENOUS at 15:29

## 2019-01-01 RX ADMIN — HEPARIN SODIUM 5000 UNITS: 5000 INJECTION INTRAVENOUS; SUBCUTANEOUS at 17:15

## 2019-01-01 RX ADMIN — SODIUM CHLORIDE 125 ML/HR: 900 INJECTION, SOLUTION INTRAVENOUS at 00:06

## 2019-01-01 RX ADMIN — SENNOSIDES AND DOCUSATE SODIUM 1 TABLET: 8.6; 5 TABLET ORAL at 13:09

## 2019-01-01 RX ADMIN — ASPIRIN 81 MG 81 MG: 81 TABLET ORAL at 08:13

## 2019-01-01 RX ADMIN — MORPHINE SULFATE 2 MG: 2 INJECTION, SOLUTION INTRAMUSCULAR; INTRAVENOUS at 17:35

## 2019-01-01 RX ADMIN — SODIUM CHLORIDE 100 ML/HR: 900 INJECTION, SOLUTION INTRAVENOUS at 18:35

## 2019-01-01 RX ADMIN — ASPIRIN 81 MG 81 MG: 81 TABLET ORAL at 08:19

## 2019-01-01 RX ADMIN — SODIUM CHLORIDE 10 ML: 9 INJECTION, SOLUTION INTRAMUSCULAR; INTRAVENOUS; SUBCUTANEOUS at 13:28

## 2019-01-01 RX ADMIN — HEPARIN SODIUM 5000 UNITS: 5000 INJECTION INTRAVENOUS; SUBCUTANEOUS at 10:01

## 2019-01-01 RX ADMIN — HEPARIN SODIUM 5000 UNITS: 5000 INJECTION INTRAVENOUS; SUBCUTANEOUS at 09:01

## 2019-01-01 RX ADMIN — SODIUM CHLORIDE, PRESERVATIVE FREE 3 ML: 5 INJECTION INTRAVENOUS at 17:36

## 2019-01-01 RX ADMIN — ONDANSETRON 4 MG: 2 INJECTION INTRAMUSCULAR; INTRAVENOUS at 13:47

## 2019-01-01 RX ADMIN — MORPHINE SULFATE 2 MG: 2 INJECTION, SOLUTION INTRAMUSCULAR; INTRAVENOUS at 23:13

## 2019-01-01 RX ADMIN — HALOPERIDOL LACTATE 2 MG: 5 INJECTION INTRAMUSCULAR at 23:13

## 2019-01-01 RX ADMIN — CYANOCOBALAMIN 1000 MCG: 1000 INJECTION, SOLUTION INTRAMUSCULAR; SUBCUTANEOUS at 17:16

## 2019-01-01 RX ADMIN — BUPROPION HYDROCHLORIDE 150 MG: 150 TABLET, FILM COATED, EXTENDED RELEASE ORAL at 22:24

## 2019-01-01 RX ADMIN — HALOPERIDOL 2 MG: 5 INJECTION INTRAMUSCULAR at 13:03

## 2019-01-01 RX ADMIN — SODIUM CHLORIDE 1000 ML: 900 INJECTION, SOLUTION INTRAVENOUS at 13:47

## 2019-01-01 RX ADMIN — SODIUM CHLORIDE 40 MG: 9 INJECTION, SOLUTION INTRAMUSCULAR; INTRAVENOUS; SUBCUTANEOUS at 10:08

## 2019-01-01 RX ADMIN — MORPHINE SULFATE 2 MG: 2 INJECTION, SOLUTION INTRAMUSCULAR; INTRAVENOUS at 20:09

## 2019-01-01 RX ADMIN — SODIUM CHLORIDE, PRESERVATIVE FREE 3 ML: 5 INJECTION INTRAVENOUS at 21:51

## 2019-01-01 RX ADMIN — MORPHINE SULFATE 2 MG: 2 INJECTION, SOLUTION INTRAMUSCULAR; INTRAVENOUS at 13:47

## 2019-01-01 RX ADMIN — SODIUM CHLORIDE 75 ML/HR: 900 INJECTION, SOLUTION INTRAVENOUS at 13:12

## 2019-01-01 RX ADMIN — SODIUM CHLORIDE 10 ML: 9 INJECTION, SOLUTION INTRAMUSCULAR; INTRAVENOUS; SUBCUTANEOUS at 13:27

## 2019-01-01 RX ADMIN — Medication 10 ML: at 06:40

## 2019-01-01 RX ADMIN — LORAZEPAM 1 MG: 2 INJECTION INTRAMUSCULAR; INTRAVENOUS at 21:49

## 2019-01-01 RX ADMIN — IOPAMIDOL 100 ML: 755 INJECTION, SOLUTION INTRAVENOUS at 15:07

## 2019-01-01 RX ADMIN — SODIUM CHLORIDE 40 MG: 9 INJECTION, SOLUTION INTRAMUSCULAR; INTRAVENOUS; SUBCUTANEOUS at 09:02

## 2019-01-01 RX ADMIN — MORPHINE SULFATE 2 MG: 2 INJECTION, SOLUTION INTRAMUSCULAR; INTRAVENOUS at 21:49

## 2019-01-01 RX ADMIN — RISPERIDONE 0.5 MG: 0.5 TABLET ORAL at 22:15

## 2019-01-01 RX ADMIN — Medication 10 ML: at 21:57

## 2019-01-01 RX ADMIN — Medication 10 ML: at 22:15

## 2019-01-01 RX ADMIN — Medication 10 ML: at 13:42

## 2019-01-01 RX ADMIN — MAGESIUM CITRATE 296 ML: 1.75 LIQUID ORAL at 23:02

## 2019-01-01 RX ADMIN — CEFTRIAXONE SODIUM 2 G: 2 INJECTION, POWDER, FOR SOLUTION INTRAMUSCULAR; INTRAVENOUS at 13:27

## 2019-01-01 RX ADMIN — BUPROPION HYDROCHLORIDE 150 MG: 150 TABLET, FILM COATED, EXTENDED RELEASE ORAL at 09:02

## 2019-01-01 RX ADMIN — SODIUM CHLORIDE, PRESERVATIVE FREE 3 ML: 5 INJECTION INTRAVENOUS at 20:10

## 2019-01-01 RX ADMIN — RISPERIDONE 0.5 MG: 0.5 TABLET ORAL at 10:55

## 2019-01-01 RX ADMIN — MORPHINE SULFATE 2 MG: 4 INJECTION INTRAVENOUS at 13:33

## 2019-01-01 RX ADMIN — HALOPERIDOL LACTATE 2 MG: 5 INJECTION INTRAMUSCULAR at 11:44

## 2019-01-01 RX ADMIN — LORAZEPAM 1 MG: 2 INJECTION INTRAMUSCULAR; INTRAVENOUS at 14:55

## 2019-01-01 RX ADMIN — Medication 10 ML: at 21:50

## 2019-01-01 RX ADMIN — PANTOPRAZOLE SODIUM 40 MG: 40 TABLET, DELAYED RELEASE ORAL at 06:37

## 2019-01-01 RX ADMIN — SODIUM CHLORIDE 100 ML: 900 INJECTION, SOLUTION INTRAVENOUS at 15:07

## 2019-01-01 RX ADMIN — Medication 10 ML: at 05:26

## 2019-01-01 RX ADMIN — Medication 10 ML: at 05:05

## 2019-01-01 RX ADMIN — HEPARIN SODIUM 5000 UNITS: 5000 INJECTION INTRAVENOUS; SUBCUTANEOUS at 03:19

## 2019-01-01 RX ADMIN — BUPROPION HYDROCHLORIDE 150 MG: 150 TABLET, FILM COATED, EXTENDED RELEASE ORAL at 08:13

## 2019-01-01 RX ADMIN — Medication 10 ML: at 05:07

## 2019-01-01 RX ADMIN — SODIUM CHLORIDE 75 ML/HR: 900 INJECTION, SOLUTION INTRAVENOUS at 22:18

## 2019-01-01 RX ADMIN — PERFLUTREN 1 ML: 6.52 INJECTION, SUSPENSION INTRAVENOUS at 11:00

## 2019-01-01 RX ADMIN — CYANOCOBALAMIN TAB 1000 MCG 500 MCG: 1000 TAB at 09:01

## 2019-01-01 RX ADMIN — Medication 119 G: at 15:20

## 2019-01-01 RX ADMIN — PANTOPRAZOLE SODIUM 40 MG: 40 TABLET, DELAYED RELEASE ORAL at 06:53

## 2019-01-01 RX ADMIN — SIMVASTATIN 20 MG: 20 TABLET, FILM COATED ORAL at 20:17

## 2019-01-01 RX ADMIN — AZITHROMYCIN MONOHYDRATE 500 MG: 500 INJECTION, POWDER, LYOPHILIZED, FOR SOLUTION INTRAVENOUS at 13:17

## 2019-01-01 RX ADMIN — LORAZEPAM 1 MG: 2 INJECTION INTRAMUSCULAR; INTRAVENOUS at 15:28

## 2019-01-01 RX ADMIN — MORPHINE SULFATE 1 MG: 4 INJECTION INTRAVENOUS at 17:55

## 2019-01-01 RX ADMIN — HALOPERIDOL LACTATE 2 MG: 5 INJECTION INTRAMUSCULAR at 13:03

## 2019-01-01 RX ADMIN — SODIUM CHLORIDE, PRESERVATIVE FREE 3 ML: 5 INJECTION INTRAVENOUS at 11:44

## 2019-01-01 RX ADMIN — LORAZEPAM 1 MG: 2 INJECTION INTRAMUSCULAR; INTRAVENOUS at 04:18

## 2019-01-01 RX ADMIN — ASPIRIN 81 MG 81 MG: 81 TABLET ORAL at 09:02

## 2019-01-01 RX ADMIN — SODIUM CHLORIDE 40 MG: 9 INJECTION, SOLUTION INTRAMUSCULAR; INTRAVENOUS; SUBCUTANEOUS at 09:06

## 2019-01-01 RX ADMIN — PANTOPRAZOLE SODIUM 40 MG: 40 TABLET, DELAYED RELEASE ORAL at 08:49

## 2019-01-01 RX ADMIN — ASPIRIN 81 MG 81 MG: 81 TABLET ORAL at 08:49

## 2019-01-01 RX ADMIN — BUPROPION HYDROCHLORIDE 150 MG: 150 TABLET, FILM COATED, EXTENDED RELEASE ORAL at 22:51

## 2019-01-01 RX ADMIN — Medication 10 ML: at 21:08

## 2019-01-01 RX ADMIN — HALOPERIDOL LACTATE 2 MG: 5 INJECTION INTRAMUSCULAR at 17:36

## 2019-01-01 RX ADMIN — Medication 10 ML: at 16:36

## 2019-01-01 RX ADMIN — ATORVASTATIN CALCIUM 20 MG: 10 TABLET, FILM COATED ORAL at 22:24

## 2019-01-01 RX ADMIN — MORPHINE SULFATE 1 MG: 4 INJECTION INTRAVENOUS at 21:42

## 2019-01-01 RX ADMIN — SODIUM CHLORIDE, PRESERVATIVE FREE 3 ML: 5 INJECTION INTRAVENOUS at 08:59

## 2019-01-01 RX ADMIN — MORPHINE SULFATE 2 MG: 2 INJECTION, SOLUTION INTRAMUSCULAR; INTRAVENOUS at 15:28

## 2019-01-01 RX ADMIN — SODIUM CHLORIDE 100 ML/HR: 900 INJECTION, SOLUTION INTRAVENOUS at 18:25

## 2019-01-01 RX ADMIN — Medication 10 ML: at 15:07

## 2019-01-01 NOTE — PROGRESS NOTES
Late entry due to hands on patient care. Patient in/out cath for 800 ml of clear richard urine. Patient scream out several times during procedure. Felt no resistant while in/out cathetering patient.

## 2019-01-01 NOTE — PROGRESS NOTES
01/01/19 1520 Dual Skin Pressure Injury Assessment Dual Skin Pressure Injury Assessment WDL Second Care Provider (Based on Facility Policy) Bari Knight RN

## 2019-01-01 NOTE — PROGRESS NOTES
TRANSFER - IN REPORT: 
 
Verbal report received from MARYLIN Valentin on Mynor Martinez  being received from ICU for routine progression of care Report consisted of patients Situation, Background, Assessment and  
Recommendations(SBAR). Information from the following report(s) SBAR, Kardex, ED Summary, Intake/Output, MAR and Recent Results was reviewed with the receiving nurse. Opportunity for questions and clarification was provided. Assessment completed upon patients arrival to unit and care assumed.

## 2019-01-01 NOTE — PROGRESS NOTES
Late entry due to hands on patient care. Patient has not voided. Bladder scan completed. Greater than 557 ml. Will in/out cath. Procedure explain to patient. Patient states \"do what you have to do. \" Patient with alter mental status.

## 2019-01-01 NOTE — PROGRESS NOTES
Verbal order from Dr. Margarito Moran that patient may transport to MRI without nurse. Orders to obtain an urinalysis

## 2019-01-01 NOTE — PROGRESS NOTES
MRI consent form obtained from Trudell Meckel over the phone. 2 nurse verification performed with Bekah Maradiaga RN.

## 2019-01-01 NOTE — PROGRESS NOTES
Problem: Self Care Deficits Care Plan (Adult) Goal: *Acute Goals and Plan of Care (Insert Text) 1. Patient will perform grooming with independence. 2. Patient will perform Upper body dressing with modified independence. 3. Patient will perform lower body dressing with modified independence. 4. Patient will perform upper and lower body modified independence. 5. Patient will perform toilet transfers with SBA. 6. Patient will perform shower transfer with SBA. 7. Patient will participate in 30 + minutes of ADL/ therapeutic exercise/therapeutic activity with min rest breaks to increase activity tolerance for self care. 8. Patient will perform ADL functional mobility in room with SBA. Goals to be achieved in 7 days. OCCUPATIONAL THERAPY: Initial Assessment 1/1/2019OBSERVATION: Hospital Day: 2 [place] / Product Type: Medicare /  
  
NAME/AGE/GENDER: Mynor Martinez is a 80 y.o. male PRIMARY DIAGNOSIS:  Acute kidney injury ([address] Acute kidney injury ([address] Acute kidney injury ([address] ICD-10: Treatment Diagnosis:  
 · Other lack of cordination (R27.8) · Difficulty in walking, Not elsewhere classified (R26.2) Precautions/Allergies: 
   Patient has no known allergies. ASSESSMENT:  
Mr. Camelia Levy admitted with above diagnosis. Pt presents with confusion, decreased balance, strength and self care. Pt would benefit from skilled OT to increase independence. This section established at most recent assessment PROBLEM LIST (Impairments causing functional limitations): 1. Decreased Strength 2. Decreased ADL/Functional Activities 3. Decreased Transfer Abilities 4. Decreased Ambulation Ability/Technique 5. Decreased Balance 6. Decreased Activity Tolerance INTERVENTIONS PLANNED: (Benefits and precautions of occupational therapy have been discussed with the patient.) 1. Activities of daily living training 2. Adaptive equipment training 3. Balance training 4. Clothing management 5. Therapeutic activity 6. Therapeutic exercise TREATMENT PLAN: Frequency/Duration: Follow patient 3x/week to address above goals. Rehabilitation Potential For Stated Goals: Good RECOMMENDED REHABILITATION/EQUIPMENT: (at time of discharge pending progress): Due to the probability of continued deficits (see above) this patient will likely need continued skilled occupational therapy after discharge. Equipment:  
? None at this time OCCUPATIONAL PROFILE AND HISTORY:  
History of Present Injury/Illness (Reason for Referral): 
81 yo CM with past history of HTN, HLD, osteoporosis, pulmonary embolism (not on anticoagulation), and GERD presents from independent living facility [facility]) after observed GLF while patient was walking back to his room earlier today. Patient grabbed onto his door and felt like he was falling down, was caught by a bystander, patient describes that he felt \"frozen. \" He felt lightheaded but did not pass out. Of note, patient was hospitalized for a similarly occurring episode in February 2018 with negative inpatient stroke workup. He denies chest pain, headache, shortness of breath, abdominal pain, or diarrhea. Family at bedside states that he has been getting more confused lately and has gotten lost trying to go to breakfast \"and then he gets very anxious. \"  
 
 
Past Medical History/Comorbidities:  
Mr. Shonna Matthews  has a past medical history of Diverticulitis, Essential (primary) hypertension, GERD (gastroesophageal reflux disease), Hyperlipidemia, Memory loss, Osteoporosis, Pulmonary embolism ([address] Varicella, and Volvulus of colon ([address] Mr. Shonna Matthews  has a past surgical history that includes hx lap cholecystectomy; hx hernia repair; and hx tonsil and adenoidectomy. Social History/Living Environment:  
  
Prior Level of Function/Work/Activity: 
Lives at [facility]; independent with ADLs and used walker for ambulation Number of Personal Factors/Comorbidities that affect the Plan of Care: Brief history (0):  LOW COMPLEXITY ASSESSMENT OF OCCUPATIONAL PERFORMANCE[de-identified]  
Activities of Daily Living:  
Basic ADLs (From Assessment) Complex ADLs (From Assessment) Feeding: Setup Oral Facial Hygiene/Grooming: Setup Bathing: Contact guard assistance Upper Body Dressing: Setup Lower Body Dressing: Setup Toileting: Setup Grooming/Bathing/Dressing Activities of Daily Living Cognitive Retraining Safety/Judgement: Fall prevention;Decreased awareness of need for safety Functional Transfers Bathroom Mobility: Contact guard assistance Toilet Transfer : Contact guard assistance Shower Transfer: Contact guard assistance Bed/Mat Mobility Sit to Stand: Contact guard assistance Bed to Chair: Contact guard assistance Most Recent Physical Functioning:  
Gross Assessment: 
AROM: Generally decreased, functional(BUE) Strength: Generally decreased, functional(BUE) Coordination: Generally decreased, functional(BUE) Tone: Normal 
Sensation: Intact Posture: 
  
Balance: 
Sitting: Intact Standing: With support;Pull to stand Bed Mobility: 
  
Wheelchair Mobility: 
  
Transfers: 
Sit to Stand: Contact guard assistance Stand to Sit: Contact guard assistance Bed to Chair: Contact guard assistance Patient Vitals for the past 6 hrs: 
 BP SpO2 Pulse 01/01/19 0600 96/42 98 % 67  
01/01/19 0700 102/42  75  
01/01/19 0705 117/43 98 % 73  
01/01/19 0800 108/41  67  
01/01/19 0900 136/60 100 % 78  
01/01/19 1000 118/51 97 % 81 Mental Status Neurologic State: Alert, Confused Orientation Level: Oriented to person, Disoriented to time, Disoriented to place, Disoriented to situation Cognition: Follows commands Perception: Appears intact Perseveration: No perseveration noted Safety/Judgement: Fall prevention, Decreased awareness of need for safety Physical Skills Involved: 
1. Balance 2. Strength 3. Activity Tolerance Cognitive Skills Affected (resulting in the inability to perform in a timely and safe manner): 1. Perception 2. Short Term Recall 3. Long Term Memory Psychosocial Skills Affected: 1. Habits/Routines Number of elements that affect the Plan of Care: 5+:  HIGH COMPLEXITY CLINICAL DECISION MAKING:  
[facility] AM-PAC 6 Clicks Daily Activity Inpatient Short Form How much help from another person does the patient currently need. .. Total A Lot A Little None 1. Putting on and taking off regular lower body clothing? [] 1   [] 2   [x] 3   [] 4  
2. Bathing (including washing, rinsing, drying)? [] 1   [] 2   [x] 3   [] 4  
3. Toileting, which includes using toilet, bedpan or urinal?   [] 1   [] 2   [] 3   [x] 4  
4. Putting on and taking off regular upper body clothing? [] 1   [] 2   [] 3   [x] 4  
5. Taking care of personal grooming such as brushing teeth? [] 1   [] 2   [] 3   [x] 4  
6. Eating meals? [] 1   [] 2   [] 3   [x] 4  
© 2007, Trustees of [facility], under license to [employer]. All rights reserved Score:  Initial: 22 Most Recent: X (Date: -- ) Interpretation of Tool:  Represents activities that are increasingly more difficult (i.e. Bed mobility, Transfers, Gait). Score 24 23 22-20 19-15 14-10 9-7 6 Modifier CH CI CJ CK CL CM CN   
 
? Self Care:  
  - CURRENT STATUS: CJ - 20%-39% impaired, limited or restricted  - GOAL STATUS: CI - 1%-19% impaired, limited or restricted  - D/C STATUS:  ---------------To be determined--------------- [place] / Product Type: Medicare /   
 
Medical Necessity:    
· Patient is expected to demonstrate progress in strength, balance, coordination and functional technique to increase independence with self care and functional mobility. Reason for Services/Other Comments: · Patient continues to require skilled intervention due to decrease self care and functional mobility. Use of outcome tool(s) and clinical judgement create a POC that gives a: LOW COMPLEXITY  
 
 
 
TREATMENT:  
(In addition to Assessment/Re-Assessment sessions the following treatments were rendered) Pre-treatment Symptoms/Complaints: Tolerated ambulating in hallway Pain: Initial:  
Pain Intensity 1: 0  Post Session:  0 Assessment/Reassessment only, no treatment provided today Braces/Orthotics/Lines/Etc:  
· ICU monitors · O2 Device: Room air Treatment/Session Assessment:   
· Response to Treatment:  Tolerated well · Interdisciplinary Collaboration:  
o Physical Therapist 
o Registered Nurse · After treatment position/precautions:  
o Up in chair 
o Bed alarm/tab alert on 
o Bed/Chair-wheels locked 
o Call light within reach 
o RN notified 
o LEs reclined · Compliance with Program/Exercises: Compliant all of the time. · Recommendations/Intent for next treatment session: \"Next visit will focus on advancements to more challenging activities and reduction in assistance provided\". Total Treatment Duration: OT Patient Time In/Time Out Time In: 0900 Time Out: 4701 Abdoul Cano, OT

## 2019-01-01 NOTE — PROGRESS NOTES
Hospitalist Progress Note 2019 Admit Date: 2018  9:55 AM  
NAME: Jayla Rojas :  1920 DOS:              19 MRN:  [id number] Attending: Ted Cm MD 
PCP:  Cate Conteh MD 
Treatment Team: Attending Provider: Pinkie Goldberg, DO; Care Manager: Kathy Garcia 
 
DNR SUBJECTIVE: As previously documented: 79 yo CM with past history of HTN, HLD, osteoporosis, pulmonary embolism (not on anticoagulation), and GERD presented from [facility] living facility [facility] after feeling\" frozen\". Of note, patient was hospitalized for a similarly occurring episode in 2018 with negative inpatient stroke workup. In the ED CT head negative. CXR showing right basilar infiltrate. Given empiric Rocephin and ceftriaxone and fluids. SCr of 1.9 (baseline of 1.1 to 1.2). In the floor stroke work up negative. Cr back to normal ranges. 19    Jayla Rojas is pleasantly confused. Patient stated he is feeling \"ok\". Patient denies SOB or weakness. Family reported he had some baseline confusion. 10+ ROS reviewed and negative except for positive in HPI. No Known Allergies Current Facility-Administered Medications Medication Dose Route Frequency  sodium chloride (NS) flush 5-10 mL  5-10 mL IntraVENous Q8H  
 sodium chloride (NS) flush 5-10 mL  5-10 mL IntraVENous PRN  
 cefTRIAXone (ROCEPHIN) 2 g in 0.9% sodium chloride (MBP/ADV) 50 mL  2 g IntraVENous Q24H  
 azithromycin (ZITHROMAX) 500 mg in 0.9% sodium chloride (MBP/ADV) 250 mL  500 mg IntraVENous Q24H  
 sodium chloride (NS) flush 5-10 mL  5-10 mL IntraVENous Q8H  
 sodium chloride (NS) flush 5-10 mL  5-10 mL IntraVENous PRN  
 acetaminophen (TYLENOL) tablet 650 mg  650 mg Oral Q4H PRN  
 ondansetron (ZOFRAN) injection 4 mg  4 mg IntraVENous Q4H PRN  
 bisacodyl (DULCOLAX) tablet 5 mg  5 mg Oral DAILY PRN  
 heparin (porcine) injection 5,000 Units  5,000 Units SubCUTAneous Q8H  
  aspirin chewable tablet 81 mg  81 mg Oral DAILY  buPROPion SR (WELLBUTRIN SR) tablet 150 mg  150 mg Oral DAILY  simvastatin (ZOCOR) tablet 20 mg  20 mg Oral QHS  pantoprazole (PROTONIX) tablet 40 mg  40 mg Oral ACB Immunization History Administered Date(s) Administered  Influenza High Dose Vaccine PF 2017, 10/30/2018  Influenza Vaccine 10/01/2016  Pneumococcal Conjugate (PCV-13) 10/01/2015  Pneumococcal Polysaccharide (PPSV-23) 10/01/2016  Tdap 2017 Objective:  
 
Patient Vitals for the past 24 hrs: 
 Temp Pulse Resp BP SpO2  
19 1313  75 30 121/67 99 % 19 1234 97.7 °F (36.5 °C)      
19 1000  81 16 118/51 97 % 19 0900  78 18 136/60 100 % 19 0800  67 18 108/41   
19 0705 98.1 °F (36.7 °C) 73 12 117/43 98 % 19 0700  75  102/42   
19 0600  67 16 96/42 98 % 19 0500  71 20 105/45 98 % 19 0400  63 8 100/42 98 % 19 0300 98.6 °F (37 °C) 63 19 (!) 90/39 98 % 19 0200  (!) 58 8 (!) 104/39 99 % 19 0102  67 28 107/41 95 % 19 0000  65 8 (!) 102/36 98 % 18 2300 98.4 °F (36.9 °C) 67 20 114/52 99 % 18 2200  62 30 114/45   
18 2158  67 (!) 33 125/50   
18 2030  67 10 111/49 98 % 18 2000  75 25 103/44 95 % 18 1930 99.2 °F (37.3 °C) 68 8 96/43 97 % 18 1906  69 22 (!) 103/37 96 % 18 1857  69 24 102/46 97 % 18 1800    108/43   
18 1759  68 23  98 % 18 1747 98.3 °F (36.8 °C) (!) 59 28 98/42 100 % 18 1745    98/42   
18 1743  (!) 59 15 (!) 92/39 99 % 18 1700    101/46 96 % 18 1630    101/51 97 % 18 1530    108/53 96 % Temp (24hrs), Av.4 °F (36.9 °C), Min:97.7 °F (36.5 °C), Max:99.2 °F (37.3 °C) Oxygen Therapy O2 Sat (%): 99 % (19) Pulse via Oximetry: 70 beats per minute (19) O2 Device: Room air (01/01/19 0705) O2 Flow Rate (L/min): 2 l/min (01/01/19 0300) Oxygen Therapy O2 Sat (%): 99 % (01/01/19 1313) Pulse via Oximetry: 70 beats per minute (01/01/19 1313) O2 Device: Room air (01/01/19 0705) O2 Flow Rate (L/min): 2 l/min (01/01/19 0300) Physical Exam: 
General:         Alert, cooperative, no distress HEENT:               NCAT. No obvious deformity. Nares normal. No drainage Lungs: No wheezing/rhonchi/rales Cardiovascular:   RRR. No m/r/g. No pedal edema b/l. +2 PT/DT pulses b/l. Abdomen:       S/nt/nd. Bowel sounds normal. .  
Skin:         No rashes or lesions. Not Jaundiced Neurologic:    CN II- XII  WNL. No gross focal deficit. Psychiatric:         Good mood. Normal affect. DIAGNOSTIC STUDIES Data Review:  
Recent Results (from the past 24 hour(s)) URINALYSIS W/ RFLX MICROSCOPIC Collection Time: 12/31/18 11:14 PM  
Result Value Ref Range Color YELLOW Appearance CLEAR Specific gravity 1.017 1.001 - 1.023    
 pH (UA) 6.0 5.0 - 9.0 Protein NEGATIVE  NEG mg/dL Glucose NEGATIVE  mg/dL Ketone NEGATIVE  NEG mg/dL Bilirubin NEGATIVE  NEG Blood NEGATIVE  NEG Urobilinogen 1.0 0.2 - 1.0 EU/dL Nitrites NEGATIVE  NEG Leukocyte Esterase NEGATIVE  NEG    
METABOLIC PANEL, BASIC Collection Time: 01/01/19  2:56 AM  
Result Value Ref Range Sodium 142 136 - 145 mmol/L Potassium 4.0 3.5 - 5.1 mmol/L Chloride 112 (H) 98 - 107 mmol/L  
 CO2 22 21 - 32 mmol/L Anion gap 8 mmol/L Glucose 86 65 - 100 mg/dL BUN 23 8 - 23 MG/DL Creatinine 1.24 0.8 - 1.5 MG/DL  
 GFR est AA >60 >60 ml/min/1.73m2 GFR est non-AA 57 ml/min/1.73m2 Calcium 7.2 (L) 8.3 - 10.4 MG/DL  
CBC WITH AUTOMATED DIFF Collection Time: 01/01/19  2:56 AM  
Result Value Ref Range WBC 3.5 (L) 4.3 - 11.1 K/uL  
 RBC 2.88 (L) 4.23 - 5.6 M/uL HGB 9.7 (L) 13.6 - 17.2 g/dL HCT 29.3 (L) 41.1 - 50.3 % .7 (H) 79.6 - 97.8 FL  
 MCH 33.7 (H) 26.1 - 32.9 PG  
 MCHC 33.1 31.4 - 35.0 g/dL  
 RDW 13.2 11.9 - 14.6 % PLATELET 689 714 - 926 K/uL MPV 8.3 (L) 9.4 - 12.3 FL ABSOLUTE NRBC 0.00 0.0 - 0.2 K/uL  
 DF AUTOMATED NEUTROPHILS 64 43 - 78 % LYMPHOCYTES 25 13 - 44 % MONOCYTES 8 4.0 - 12.0 % EOSINOPHILS 2 0.5 - 7.8 % BASOPHILS 1 0.0 - 2.0 % IMMATURE GRANULOCYTES 0 0.0 - 5.0 %  
 ABS. NEUTROPHILS 2.2 1.7 - 8.2 K/UL  
 ABS. LYMPHOCYTES 0.9 0.5 - 4.6 K/UL  
 ABS. MONOCYTES 0.3 0.1 - 1.3 K/UL  
 ABS. EOSINOPHILS 0.1 0.0 - 0.8 K/UL  
 ABS. BASOPHILS 0.0 0.0 - 0.2 K/UL  
 ABS. IMM. GRANS. 0.0 0.0 - 0.5 K/UL  
VITAMIN B12 Collection Time: 01/01/19  2:56 AM  
Result Value Ref Range Vitamin B12 152 (L) 193 - 986 pg/mL FOLATE Collection Time: 01/01/19  2:56 AM  
Result Value Ref Range Folate 8.6 3.1 - 17.5 ng/mL TSH 3RD GENERATION Collection Time: 01/01/19  2:56 AM  
Result Value Ref Range TSH 0.469 uIU/mL CBC WITH AUTOMATED DIFF Collection Time: 01/01/19  7:48 AM  
Result Value Ref Range WBC 4.0 (L) 4.3 - 11.1 K/uL  
 RBC 3.32 (L) 4.23 - 5.6 M/uL  
 HGB 11.2 (L) 13.6 - 17.2 g/dL HCT 33.6 (L) 41.1 - 50.3 % .2 (H) 79.6 - 97.8 FL  
 MCH 33.7 (H) 26.1 - 32.9 PG  
 MCHC 33.3 31.4 - 35.0 g/dL  
 RDW 13.2 11.9 - 14.6 % PLATELET 127 006 - 328 K/uL MPV 8.7 (L) 9.4 - 12.3 FL ABSOLUTE NRBC 0.00 0.0 - 0.2 K/uL  
 DF AUTOMATED NEUTROPHILS 71 43 - 78 % LYMPHOCYTES 21 13 - 44 % MONOCYTES 6 4.0 - 12.0 % EOSINOPHILS 2 0.5 - 7.8 % BASOPHILS 0 0.0 - 2.0 % IMMATURE GRANULOCYTES 0 0.0 - 5.0 %  
 ABS. NEUTROPHILS 2.8 1.7 - 8.2 K/UL  
 ABS. LYMPHOCYTES 0.8 0.5 - 4.6 K/UL  
 ABS. MONOCYTES 0.2 0.1 - 1.3 K/UL  
 ABS. EOSINOPHILS 0.1 0.0 - 0.8 K/UL  
 ABS. BASOPHILS 0.0 0.0 - 0.2 K/UL  
 ABS. IMM. GRANS. 0.0 0.0 - 0.5 K/UL All Micro Results Procedure Component Value Units Date/Time CULTURE, BLOOD [[id number]] Collected:  12/31/18 0590 Order Status:  Completed Specimen:  Whole Blood Updated:  01/01/19 1210 Special Requests: --     
  LEFT Antecubital 
  
  Culture result: NO GROWTH AFTER 21 HOURS     
 CULTURE, BLOOD [[id number]] Collected:  12/31/18 1408 Order Status:  Completed Specimen:  Blood Updated:  01/01/19 1210 Special Requests: --     
  LEFT 
FOREARM Culture result: NO GROWTH AFTER 21 HOURS INFLUENZA A & B AG (RAPID TEST) [[id number]] Collected:  12/31/18 1245 Order Status:  Completed Specimen:  Nasopharyngeal from Nasal washing Updated:  12/31/18 1342 Influenza A Ag NEGATIVE Comment: NEGATIVE FOR THE PRESENCE OF INFLUENZA A ANTIGEN 
INFECTION DUE TO INFLUENZA A CANNOT BE RULED OUT. BECAUSE THE ANTIGEN PRESENT IN THE SAMPLE MAY BE BELOW 
THE DETECTION LIMIT OF THE TEST. A NEGATIVE TEST IS PRESUMPTIVE AND IT IS RECOMMENDED THAT THESE RESULTS BE CONFIRMED BY VIRAL CULTURE OR AN FDA-CLEARED INFLUENZA A AND B MOLECULAR ASSAY. Influenza B Ag NEGATIVE Comment: NEGATIVE FOR THE PRESENCE OF INFLUENZA B ANTIGEN 
INFECTION DUE TO INFLUENZA B CANNOT BE RULED OUT. BECAUSE THE ANTIGEN PRESENT IN THE SAMPLE MAY BE BELOW 
THE DETECTION LIMIT OF THE TEST. A NEGATIVE TEST IS PRESUMPTIVE AND IT IS RECOMMENDED THAT THESE RESULTS BE CONFIRMED BY VIRAL CULTURE OR AN FDA-CLEARED INFLUENZA A AND B MOLECULAR ASSAY. Source NASOPHARYNGEAL Imaging Sidra Purl /Studies: CXR Results  (Last 48 hours) 12/31/18 1245  XR CHEST PORT Final result Impression:  IMPRESSION: Right basilar mild infiltrate. Narrative:  Chest portable CLINICAL INDICATION: Acute dizziness and weakness, confusion, vomiting COMPARISON: 2/24/2018 TECHNIQUE: single AP portable view chest at 12:40 PM upright FINDINGS: There is mildly increased groundglass opacity in the right base. There  
is no evidence of a dense consolidation, pneumothorax, pleural effusion or pulmonary edema. The mediastinal and hilar contours are stable and likely normal  
given technique. CT Results  (Last 48 hours) 12/31/18 1209  CT HEAD WO CONT Final result Impression:  Impression: No acute intracranial abnormality. Narrative:  Noncontrast head CT Clinical Indication: Acute dizziness, difficulty walking, confusion, vomiting. Technique: Noncontrast axial images were obtained through the brain. All CT  
scans at this location are performed using dose modulation techniques as  
appropriate including the following: Automated exposure control, adjustment of  
the MA and/or kV according to patient's size, or use of iterative reconstruction  
technique. Comparison: 4/25/2018 CT, 2/25/2018 MRI Findings: There is no acute intracranial hemorrhage, hydrocephalus, intra-axial  
mass, or mass-effect. Chronic generalized age commensurate atrophy and white  
matter hypodensities are again seen. There is no CT evidence of acute large  
artery territorial infarction or abnormal extra-axial fluid collection. The mastoid air cells and paranasal sinuses are clear where imaged. No displaced skull fractures are present. Mri Brain Wo Cont Result Date: 12/31/2018 IMPRESSION: 1. No acute intracranial findings. Specifically, no evidence for acute infarction is seen. Only chronic appearing changes are seen as described above. Duplex Carotid Bilateral 
 
Result Date: 1/1/2019 IMPRESSION: Moderate bilateral atherosclerotic disease however no Doppler evidence of hemodynamically significant stenosis. Extensive atherosclerotic plaque could serve as a nidus for platelet aggregation with subsequent embolization. No results found for this visit on 12/31/18. Labs and Studies from previous 24 hours have been personally reviewed by myself   
ASSESSMENT Active Hospital Problems Diagnosis Date Noted  Acute kidney injury ([facility] [address] 12/31/2018  CAP (community acquired pneumonia) 12/31/2018  Memory change Hospital Problems as of 1/1/2019 Date Reviewed: 11/19/2018 Codes Class Noted - Resolved POA * (Principal) Acute kidney injury ([facility] [address] ICD-10-CM: N17.9 ICD-9-CM: 584.9  12/31/2018 - Present Unknown CAP (community acquired pneumonia) ICD-10-CM: J18.9 ICD-9-CM: 084  12/31/2018 - Present Unknown Memory change ICD-10-CM: R41.3 ICD-9-CM: 780.93  Unknown - Present Unknown A/P: 
 
-Memory change ? Secondary to dehydration vs delir Likely back to baseline MRI brain and carotids US with no acute disease Echo pending PT evaluation 
 
-PANKAJ Resolved 
 
-CAP 
BC NGTD Cont rocephin and Zithromax day #1 
 
-Vitamin b12 deficiency Start replacement. DVT Prophylaxis: heparin CODE Status: DNR Plan of Care Discussed with: patient/family. Care team. 
 
 
Shea Torres MD 
01/01/19

## 2019-01-01 NOTE — INTERDISCIPLINARY ROUNDS
Interdisciplinary team rounds were held 1/1/2019 with the following team members:Care Management, Nursing and Physician. Plan of care discussed. See clinical pathway and/or care plan for interventions and desired outcomes.

## 2019-01-01 NOTE — PROGRESS NOTES
TRANSFER - OUT REPORT: 
 
Verbal report given to Renu Lee RN on Corene Dann  being transferred to [facility](unit) for routine progression of care Report consisted of patients Situation, Background, Assessment and  
Recommendations(SBAR). Information from the following report(s) SBAR, Kardex, ED Summary, Intake/Output, MAR, Recent Results and Med Rec Status was reviewed with the receiving nurse. Lines:  
Peripheral IV 01/01/19 Left Forearm (Active) Site Assessment Clean, dry, & intact 1/1/2019  1:48 PM  
Phlebitis Assessment 0 1/1/2019  1:48 PM  
Infiltration Assessment 0 1/1/2019  1:48 PM  
Dressing Status Clean, dry, & intact 1/1/2019  1:48 PM  
Dressing Type Transparent;Tape 1/1/2019  1:48 PM  
Hub Color/Line Status Pink; Infusing;Patent 1/1/2019  1:48 PM  
Alcohol Cap Used No 1/1/2019  1:48 PM  
  
 
Opportunity for questions and clarification was provided. Patient transported with: 
 Monitor, Tech

## 2019-01-01 NOTE — PROGRESS NOTES
Patient states he does not have the need to urinate. Bladder scan for 293 ml. Will inform oncoming nurse.

## 2019-01-01 NOTE — PROGRESS NOTES
Admission assessment complete. Pt resting in bed. Alert to person. Disoriented to place/time/situation. Respirations present, even, unlabored. Lung sounds clear to auscultation. HR regular, S1&S2 auscultated. Tele box in place. Bed in lowest position, call light within reach, side rails x3, bed alarm on. Will continue to monitor throughout the shift.

## 2019-01-01 NOTE — PROGRESS NOTES
Problem: Mobility Impaired (Adult and Pediatric) Goal: *Therapy Goal (Edit Goal, Insert Text) STG: 
(1.)Mr. Kellie Sparrow will move from supine to sit and sit to supine  with INDEPENDENT within 1-2 treatment day(s). (2.)Mr. Kellie Sparrow will transfer from bed to chair and chair to bed with INDEPENDENT using the least restrictive device within 1-2 treatment day(s). (3.)Mr. Kellie Sparrow will ambulate with INDEPENDENT for 100 feet with the least restrictive device within 1-2 treatment day(s). LTG: 
(1.)Mr. Kellie Sparrow will ambulate with INDEPENDENT for 150-250 feet with the least restrictive device within 3-7 treatment day(s). ________________________________________________________________________________________________ Outcome: Progressing Towards Goal 
 
PHYSICAL THERAPY: Initial Assessment 1/1/2019OBSERVATION: Hospital Day: 2 [place] / Product Type: Medicare /  
  
NAME/AGE/GENDER: Jorge Marques is a 80 y.o. male PRIMARY DIAGNOSIS: Acute kidney injury ([address] Acute kidney injury ([facility] [address] Acute kidney injury ([facility] [address] ICD-10: Treatment Diagnosis: · Difficulty in walking, Not elsewhere classified (R26.2) · Other abnormalities of gait and mobility (R26.89) Precaution/Allergies: 
Patient has no known allergies. ASSESSMENT:  
Mr. Kellie Sparrow presents with decreased independence with functional mobility. Pt performed sit to stand. Pt ambulated in hallway. Left pt up in bedside chair with needs in reach. Pt appears confused, which appears to be pt's baseline. This section established at most recent assessment PROBLEM LIST (Impairments causing functional limitations): 1. Decreased Strength 2. Decreased Transfer Abilities 3. Decreased Ambulation Ability/Technique 4. Decreased Balance 5. Increased Pain 6. Decreased Activity Tolerance 7. Decreased Flexibility/Joint Mobility  INTERVENTIONS PLANNED: (Benefits and precautions of physical therapy have been discussed with the patient.) 1. Bed Mobility 2. Gait Training 3. Therapeutic Activites 4. Transfer Training TREATMENT PLAN: Frequency/Duration: daily for duration of hospital stay Rehabilitation Potential For Stated Goals: Good RECOMMENDED REHABILITATION/EQUIPMENT: (at time of discharge pending progress): Due to the probability of continued deficits (see above) this patient will likely need continued skilled physical therapy after discharge. Equipment:  
? None at this time HISTORY:  
History of Present Injury/Illness (Reason for Referral): Pt with decreased mobility, secondary to above diagnosis. Past Medical History/Comorbidities:  
Mr. Toby Bautista  has a past medical history of Diverticulitis, Essential (primary) hypertension, GERD (gastroesophageal reflux disease), Hyperlipidemia, Memory loss, Osteoporosis, Pulmonary embolism ([address] Varicella, and Volvulus of colon ([facility] [address] Mr. Toby Bautista  has a past surgical history that includes hx lap cholecystectomy; hx hernia repair; and hx tonsil and adenoidectomy. Social History/Living Environment:  
Home Environment: Assisted living Care [facility]: [facility] One[facility] Story Residence: One story Living Alone: No 
Support Systems: Child(munira), Family member(s), Assisted living Patient Expects to be Discharged to[de-identified] Assisted living Current DME Used/Available at Home: None Prior Level of Function/Work/Activity: 
[place] with ambulation. Number of Personal Factors/Comorbidities that affect the Plan of Care: 0: LOW COMPLEXITY EXAMINATION:  
Most Recent Physical Functioning:  
Gross Assessment: 
AROM: Generally decreased, functional 
Strength: Generally decreased, functional 
Coordination: Generally decreased, functional 
Tone: Normal 
Sensation: Intact Posture: 
Posture (WDL): Within defined limits Balance: 
Sitting: Intact Standing: Pull to stand; With support Bed Mobility: 
  
Wheelchair Mobility: 
  
Transfers: Sit to Stand: Contact guard assistance Stand to Sit: Contact guard assistance Gait: 
  
Gait Abnormalities: Other(unsteady gait) Distance (ft): 125 Feet (ft) Ambulation - Level of Assistance: Contact guard assistance Body Structures Involved: 1. Bones 2. Joints 3. Muscles 4. Ligaments Body Functions Affected: 1. Neuromusculoskeletal 
2. Movement Related Activities and Participation Affected: 1. Mobility Number of elements that affect the Plan of Care: 4+: HIGH COMPLEXITY CLINICAL PRESENTATION:  
Presentation: Stable and uncomplicated: LOW COMPLEXITY CLINICAL DECISION MAKING:  
[facility] AM-PAC 6 Clicks Basic Mobility Inpatient Short Form How much difficulty does the patient currently have. .. Unable A Lot A Little None 1. Turning over in bed (including adjusting bedclothes, sheets and blankets)? [] 1   [] 2   [x] 3   [] 4  
2. Sitting down on and standing up from a chair with arms ( e.g., wheelchair, bedside commode, etc.)   [] 1   [] 2   [x] 3   [] 4  
3. Moving from lying on back to sitting on the side of the bed? [] 1   [] 2   [x] 3   [] 4 How much help from another person does the patient currently need. .. Total A Lot A Little None 4. Moving to and from a bed to a chair (including a wheelchair)? [] 1   [] 2   [x] 3   [] 4  
5. Need to walk in hospital room? [] 1   [] 2   [x] 3   [] 4  
6. Climbing 3-5 steps with a railing? [] 1   [] 2   [x] 3   [] 4  
© 2007, Trustees of [facility], under license to [employer]. All rights reserved Score:  Initial: 18 Most Recent: X (Date: -- ) Interpretation of Tool:  Represents activities that are increasingly more difficult (i.e. Bed mobility, Transfers, Gait). Score 24 23 22-20 19-15 14-10 9-7 6 Modifier CH CI CJ CK CL CM CN   
 
? Mobility - Walking and Moving Around:  
  - CURRENT STATUS: CK - 40%-59% impaired, limited or restricted  - GOAL STATUS: CK - 40%-59% impaired, limited or restricted  - D/C STATUS:  ---------------To be determined--------------- [place] / [place] / Product Type: Medicare /   
 
Medical Necessity:    
· Skilled intervention continues to be required due to decreased mobility ability. Reason for Services/Other Comments: 
· Patient continues to require skilled intervention due to decreased mobility ability. Use of outcome tool(s) and clinical judgement create a POC that gives a: Clear prediction of patient's progress: LOW COMPLEXITY  
  
 
 
 
TREATMENT:  
(In addition to Assessment/Re-Assessment sessions the following treatments were rendered) Pre-treatment Symptoms/Complaints:   
Pain: Initial:  
   Post Session:  No complaints Assessment/Reassessment only, no treatment provided today Braces/Orthotics/Lines/Etc:  
· O2 Device: Room air Treatment/Session Assessment:   
· Response to Treatment:  Pt agreeable to ambulate. · Interdisciplinary Collaboration:  
o Physical Therapist 
o Occupational Therapist 
o Registered Nurse · After treatment position/precautions:  
o Up in chair 
o Bed/Chair-wheels locked 
o Bed in low position 
o Call light within reach 
o RN notified · Compliance with Program/Exercises: Compliant most of the time · Recommendations/Intent for next treatment session: \"Next visit will focus on advancements to more challenging activities and reduction in assistance provided\". Total Treatment Duration: PT Patient Time In/Time Out Time In: 0845 Time Out: 0900 Nichole Balderrama PT

## 2019-01-01 NOTE — PROGRESS NOTES
Observation Speech language pathology: bedside swallow note: Initial Assessment and Discharge NAME/AGE/GENDER: Syl Ovalles is a 80 y.o. male DATE: 1/1/2019 PRIMARY DIAGNOSIS: Acute kidney injury ([facility] [address] ICD-10: Treatment Diagnosis: R13.11 oral phase dysphagia INTERDISCIPLINARY COLLABORATION: Registered Nurse PRECAUTIONS/ALLERGIES: Patient has no known allergies. ASSESSMENT:Based on the objective data described below, Mr. Lan Barker presents with no overt signs/sx of aspiration with lunch meal of regular/thin. Recommend continued diet. MRI negative for CVA. No further ST indicated. : 
· PO:  Regular · Liquids:  regular thin MEDICATIONS: 
· With liquid · Whole in puree SUBJECTIVE:  
alert History of Present Injury/Illness: Mr. Lan Barker  has a past medical history of Diverticulitis, Essential (primary) hypertension, GERD (gastroesophageal reflux disease), Hyperlipidemia, Memory loss, Osteoporosis, Pulmonary embolism ([address] Varicella, and Volvulus of colon ([facility] [address] He also  has a past surgical history that includes hx lap cholecystectomy; hx hernia repair; and hx tonsil and adenoidectomy. Present Symptoms: 
Pain Scale 1: Visual 
Pain Intensity 1: 0 Current Medications: No current facility-administered medications on file prior to encounter. Current Outpatient Medications on File Prior to Encounter Medication Sig Dispense Refill  amLODIPine (NORVASC) 5 mg tablet Take 1 Tab by mouth daily. 30 Tab 3  
 buPROPion XL (WELLBUTRIN XL) 150 mg tablet Take 1 Tab by mouth every morning. 30 Tab 3  
 lovastatin (MEVACOR) 40 mg tablet Take 1 Tab by mouth nightly. 30 Tab 3  
 omeprazole (PRILOSEC) 20 mg capsule Take 1 Cap by mouth daily. 30 Cap 3  
 aspirin 81 mg chewable tablet Take 81 mg by mouth daily. Current Dietary Status:   
 Regular/thin OBJECTIVE:  
Respiratory Status:  Room air  2 l/min Oral Motor Structure/Speech:  Oral-Motor Structure/Motor Speech Labial: No impairment Oral Hygiene: adequate Lingual: No impairment Cognitive and Communication Status: 
Neurologic State: Alert Orientation Level: Appropriate for age Cognition: Follows commands Perception: Appears intact Perseveration: No perseveration noted Safety/Judgement: Fall prevention BEDSIDE SWALLOW EVALUATIONOral Assessment: 
Oral Assessment Labial: No impairment Oral Hygiene: adequate Lingual: No impairment P.O. Trials: 
Patient Position: upright in bed The patient was given teaspoon amounts of the following:  
Consistency Presented: Thin liquid;Mixed consistency; Solid How Presented: Self-fed/presented;Straw ORAL PHASE: 
Bolus Acceptance: No impairment Bolus Formation/Control: No impairment Propulsion: No impairment Oral Residue: None PHARYNGEAL PHASE: 
Initiation of Swallow: No impairment Aspiration Signs/Symptoms: None Vocal Quality: No impairment Pharyngeal Phase Characteristics: No impairment, issues, or problems OTHER OBSERVATIONS: 
Rate/bite size: WNL Endurance: WNL Comments: Tool Used: Dysphagia Outcome and Severity Scale (SHAMIKA) Score Comments Normal Diet  [x] 7 With no strategies or extra time needed Functional Swallow  [] 6 May have mild oral or pharyngeal delay Mild Dysphagia 
  [] 5 Which may require one diet consistency restricted (those who demonstrate penetration which is entirely cleared on MBS would be included) Mild-Moderate Dysphagia  [] 4 With 1-2 diet consistencies restricted Moderate Dysphagia  [] 3 With 2 or more diet consistencies restricted Moderately Severe Dysphagia  [] 2 With partial PO strategies (trials with ST only) Severe Dysphagia  [] 1 With inability to tolerate any PO safely Score:  Initial: 7 Most Recent: X (Date: -- ) Interpretation of Tool: The Dysphagia Outcome and Severity Scale (SHAMIKA) is a simple, easy-to-use, 7-point scale developed to systematically rate the functional severity of dysphagia based on objective assessment and make recommendations for diet level, independence level, and type of nutrition. Score 7 6 5 4 3 2 1 Modifier CH CI CJ CK CL CM CN ? Swallowing:  
  - CURRENT STATUS: CH - 0% impaired, limited or restricted  - GOAL STATUS:  CH - 0% impaired, limited or restricted  - D/C STATUS:  CH - 0% impaired, limited or restricted [place] / [place] / Product Type: Medicare /  
 
TREATMENT:  
 (In addition to Assessment/Re-Assessment sessions the following treatments were rendered) Assessment/Reassessment only, no treatment provided today MODALITIES:  
   
 
ORAL MOTOR  EXERCISES: 
   
 
LARYNGEAL / PHARYNGEAL EXERCISES: 
   
 
__________________________________________________________________________________________________ Safety: After treatment position/precautions: 
· Call light within reach · RN notified · Upright in Bed No further ST indicated Total Treatment Duration: 
Time In: 1230 Time Out: 1409  Gillian Whitney BERNARD/TRAVIS/SLP

## 2019-01-01 NOTE — PROGRESS NOTES
Late entry due to hands on patient care. Patient sleeping but easy to arouse. Oriented to name only. Follow some simple commands. Denies pain. Sinus with 1st degree AV block. Heart rate 60. Respiration even and unlabored. Oxygen saturation 93 % on room  Unable to do admission database. No family present. Allevyn peeled back. Sacral/coccyx pink and blanchable. Bed in low/lock position. Bed alarm on.

## 2019-01-02 NOTE — PROGRESS NOTES
Discharge instructions were reviewed with the patient's family. Opportunity for questions given. Patient's family verbalized understanding of discharge and follow up instructions, as well as S/S to report to MD or return to ER for. PIV was removed. Prescriptions provided. Patient will D/C to home.

## 2019-01-02 NOTE — PROGRESS NOTES
Spiritual Care initial visit made by [facility] [employer]. Support given. Card left. CHASE Bedoya Div / Bereavement Coordinator

## 2019-01-02 NOTE — PROGRESS NOTES
Problem: Falls - Risk of 
Goal: *Absence of Falls Document Cornelious Booty Fall Risk and appropriate interventions in the flowsheet. Outcome: Progressing Towards Goal 
Fall Risk Interventions: 
Mobility Interventions: Bed/chair exit alarm Mentation Interventions: Bed/chair exit alarm Medication Interventions: Bed/chair exit alarm Elimination Interventions: Call light in reach Problem: Pressure Injury - Risk of 
Goal: *Prevention of pressure injury Document Ankit Scale and appropriate interventions in the flowsheet. Outcome: Progressing Towards Goal 
Pressure Injury Interventions: 
Sensory Interventions: Assess changes in LOC Moisture Interventions: Absorbent underpads, Apply protective barrier, creams and emollients Activity Interventions: Pressure redistribution bed/mattress(bed type) Mobility Interventions: HOB 30 degrees or less Nutrition Interventions: Offer support with meals,snacks and hydration Friction and Shear Interventions: HOB 30 degrees or less

## 2019-01-02 NOTE — DISCHARGE SUMMARY
Hospitalist Discharge Summary Admit Date:  2018  9:55 AM  
Name:  Graciela Munson Age:  80 y.o. 
:  1920 MRN:  [id number] PCP:  Otf Vallecillo MD 
Treatment Team: Attending Provider: Carlos Enrique Larson DO; Care Manager: Anjana Serna Utilization Review: Vivek Hameed RN 
 
Problem List for this Hospitalization: 
Hospital Problems as of 2019 Date Reviewed: 2018 Codes Class Noted - Resolved POA * (Principal) Acute kidney injury ([facility] [address] ICD-10-CM: N17.9 ICD-9-CM: 584.9  2018 - Present Unknown CAP (community acquired pneumonia) ICD-10-CM: J18.9 ICD-9-CM: 086  2018 - Present Unknown Memory change ICD-10-CM: R41.3 ICD-9-CM: 780.93  Unknown - Present Unknown Admission HPI from 2018:   
\" Please refer to HPI for more details \". Hospital Course: 
 
79 yo CM with past history of HTN, HLD, osteoporosis, pulmonary embolism (not on anticoagulation), and GERD presented from independent living facility [facility]) after feeling\" frozen\". Of note, patient was hospitalized for a similarly occurring episode in 2018 with negative inpatient stroke workup. In the ED CT head negative. CXR showing right basilar infiltrate. Given empiric Rocephin and ceftriaxone and fluids. SCr of 1.9 (baseline of 1.1 to 1.2). In the floor stroke work up negative. Cr back to normal ranges. Patient was able to walking with PT in the nogueira ways. Patient symptoms likely secondary to acute metabolic encephalopathy due to pneumonia, vitamin b12 def and acute kidney injury. I discussed with patient's son who arrange for sitters at home. Amlodipine will be held as blood pressure was stable off medication while inpatient. Follow up instructions below. Plan was discussed with patient/family/careteam.  All questions answered.   Patient was stable at time of discharge and was instructed to call or return if there are any concerns or recurrence of symptoms. Diagnostic Imaging/Tests: All Micro Results Procedure Component Value Units Date/Time CULTURE, BLOOD [[id number]] Collected:  12/31/18 1408 Order Status:  Completed Specimen:  Whole Blood Updated:  01/02/19 0915 Special Requests: --     
  LEFT Antecubital 
  
  Culture result: NO GROWTH 2 DAYS     
 CULTURE, BLOOD [[id number]] Collected:  12/31/18 1408 Order Status:  Completed Specimen:  Blood Updated:  01/02/19 1015 Special Requests: --     
  LEFT 
FOREARM Culture result: NO GROWTH 2 DAYS INFLUENZA A & B AG (RAPID TEST) [[id number]] Collected:  12/31/18 1245 Order Status:  Completed Specimen:  Nasopharyngeal from Nasal washing Updated:  12/31/18 1342 Influenza A Ag NEGATIVE Comment: NEGATIVE FOR THE PRESENCE OF INFLUENZA A ANTIGEN 
INFECTION DUE TO INFLUENZA A CANNOT BE RULED OUT. BECAUSE THE ANTIGEN PRESENT IN THE SAMPLE MAY BE BELOW 
THE DETECTION LIMIT OF THE TEST. A NEGATIVE TEST IS PRESUMPTIVE AND IT IS RECOMMENDED THAT THESE RESULTS BE CONFIRMED BY VIRAL CULTURE OR AN FDA-CLEARED INFLUENZA A AND B MOLECULAR ASSAY. Influenza B Ag NEGATIVE Comment: NEGATIVE FOR THE PRESENCE OF INFLUENZA B ANTIGEN 
INFECTION DUE TO INFLUENZA B CANNOT BE RULED OUT. BECAUSE THE ANTIGEN PRESENT IN THE SAMPLE MAY BE BELOW 
THE DETECTION LIMIT OF THE TEST. A NEGATIVE TEST IS PRESUMPTIVE AND IT IS RECOMMENDED THAT THESE RESULTS BE CONFIRMED BY VIRAL CULTURE OR AN FDA-CLEARED INFLUENZA A AND B MOLECULAR ASSAY. Source NASOPHARYNGEAL Labs: Results:  
   
BMP, Mg, Phos Recent Labs 01/01/19 
0256 12/31/18 
1223  137  
K 4.0 4.7 * 102 CO2 22 25 AGAP 8 10 BUN 23 29* CREA 1.24 1.98* CA 7.2* 9.5 GLU 86 129* CBC Recent Labs 01/01/19 
9945 01/01/19 
0256 12/31/18 
1223 WBC 4.0* 3.5* 5.7  
RBC 3.32* 2.88* 4.35  
HGB 11.2* 9.7* 14.3 HCT 33.6* 29.3* 43.7  167 284 GRANS 71 64 85* LYMPH 21 25 7*  
EOS 2 2 0* MONOS 6 8 7 BASOS 0 1 1 IG 0 0 0 ANEU 2.8 2.2 4.9 ABL 0.8 0.9 0.4* SKYLER 0.1 0.1 0.0 ABM 0.2 0.3 0.4 ABB 0.0 0.0 0.0 AIG 0.0 0.0 0.0 LFT Recent Labs 12/31/18 
1223 SGOT 20 ALT 26   
TP 7.6 ALB 4.3  
GLOB 3.3 AGRAT 1.3 Cardiac Testing Lab Results Component Value Date/Time Troponin-I, Qt. <0.02 (L) 12/31/2018 12:23 PM  
  
Coagulation Tests Lab Results Component Value Date/Time INR 1.0 09/06/2017 04:14 PM  
 INR 1.0 09/06/2017 03:55 PM  
  
A1c Lab Results Component Value Date/Time Hemoglobin A1c 5.1 02/25/2018 04:12 AM  
  
Lipid Panel Lab Results Component Value Date/Time Cholesterol, total 147 11/19/2018 12:02 PM  
 HDL Cholesterol 66 11/19/2018 12:02 PM  
 LDL, calculated 70 11/19/2018 12:02 PM  
 VLDL, calculated 11 11/19/2018 12:02 PM  
 Triglyceride 56 11/19/2018 12:02 PM  
 CHOL/HDL Ratio 2.3 02/25/2018 04:12 AM  
  
Thyroid Panel Lab Results Component Value Date/Time TSH 0.469 01/01/2019 02:56 AM  
 TSH 0.392 02/25/2018 04:12 AM  
    
Most Recent UA Lab Results Component Value Date/Time Color YELLOW 12/31/2018 11:14 PM  
 Appearance CLEAR 12/31/2018 11:14 PM  
 Specific gravity 1.017 12/31/2018 11:14 PM  
 pH (UA) 6.0 12/31/2018 11:14 PM  
 Protein NEGATIVE  12/31/2018 11:14 PM  
 Glucose NEGATIVE  12/31/2018 11:14 PM  
 Ketone NEGATIVE  12/31/2018 11:14 PM  
 Bilirubin NEGATIVE  12/31/2018 11:14 PM  
 Blood NEGATIVE  12/31/2018 11:14 PM  
 Urobilinogen 1.0 12/31/2018 11:14 PM  
 Nitrites NEGATIVE  12/31/2018 11:14 PM  
 Leukocyte Esterase NEGATIVE  12/31/2018 11:14 PM  
  
 
No Known Allergies Immunization History Administered Date(s) Administered  Influenza High Dose Vaccine PF 09/06/2017, 10/30/2018  Influenza Vaccine 10/01/2016  Pneumococcal Conjugate (PCV-13) 10/01/2015  Pneumococcal Polysaccharide (PPSV-23) 10/01/2016  Tdap 09/06/2017 All Labs from Last 24 Hrs: 
No results found for this or any previous visit (from the past 24 hour(s)). Discharge Exam: 
Patient Vitals for the past 24 hrs: 
 Temp Pulse Resp BP SpO2  
01/02/19 0810 97.8 °F (36.6 °C) 65 18 120/62 95 % 01/02/19 0349 97.4 °F (36.3 °C) 71 18 145/62 92 % 01/01/19 2329 98.2 °F (36.8 °C) 77 17 100/48 93 % 01/01/19 1938 97.7 °F (36.5 °C) 73 20 141/50 96 % 01/01/19 1606 97.8 °F (36.6 °C) 62 18 99/67 97 % 01/01/19 1458 96 °F (35.6 °C) 68 18 109/52 96 % 01/01/19 1413  72 23 (!) 109/38   
01/01/19 1313  75 30 121/67 99 % 01/01/19 1234 97.7 °F (36.5 °C)      
01/01/19 1200  (!) 57 17 (!) 96/30 98 % Oxygen Therapy O2 Sat (%): 95 % (01/02/19 0810) Pulse via Oximetry: 70 beats per minute (01/01/19 1313) O2 Device: Room air (01/02/19 0708) O2 Flow Rate (L/min): 2 l/min (01/01/19 0300) Intake/Output Summary (Last 24 hours) at 1/2/2019 1128 Last data filed at 1/2/2019 3435 Gross per 24 hour Intake 600 ml Output  Net 600 ml Patient is pleasantly confused lying in the bed. Patient stated is tolerating diet with no issues. Denies SOB or any other complaints. Family at bedside all questions answered. General:    Well nourished. Alert. No distress. Eyes:   Normal sclera. Extraocular movements intact. ENT:  Normocephalic, atraumatic. Moist mucous membranes CV:   Regular rate and rhythm. No murmur, rub, or gallop. Lungs:  Clear to auscultation bilaterally. Abdomen: Soft, nontender, nondistended. Bowel sounds normal.  
Extremities: Warm and dry. No cyanosis or edema. Neurologic: No gross focal deficits Skin:     No rashes or jaundice. No wounds. Psych:  Normal mood and affect. Discharge Info:  
Current Discharge Medication List  
  
START taking these medications Details  
cyanocobalamin (VITAMIN B12) 500 mcg tablet Take 1 Tab by mouth daily. Qty: 30 Tab, Refills: 1 cefdinir (OMNICEF) 300 mg capsule Take 1 Cap by mouth two (2) times a day for 5 days. Qty: 10 Cap, Refills: 0  
  
azithromycin (ZITHROMAX) 500 mg tab Take 1 Tab by mouth See Admin Instructions for 1 day. Qty: 1 Tab, Refills: 0 CONTINUE these medications which have NOT CHANGED Details buPROPion XL (WELLBUTRIN XL) 150 mg tablet Take 1 Tab by mouth every morning. Qty: 30 Tab, Refills: 3 Associated Diagnoses: Dysthymia (or depressive neurosis) lovastatin (MEVACOR) 40 mg tablet Take 1 Tab by mouth nightly. Qty: 30 Tab, Refills: 3 Associated Diagnoses: Hyperlipidemia, unspecified hyperlipidemia type  
  
omeprazole (PRILOSEC) 20 mg capsule Take 1 Cap by mouth daily. Qty: 30 Cap, Refills: 3 Associated Diagnoses: Gastroesophageal reflux disease, esophagitis presence not specified  
  
aspirin 81 mg chewable tablet Take 81 mg by mouth daily. Associated Diagnoses: Other pulmonary embolism without acute cor pulmonale, unspecified chronicity ([facility] [address] STOP taking these medications  
  
 amLODIPine (NORVASC) 5 mg tablet Comments:  
Reason for Stopping:   
   
  
 
 
 
Disposition: home Activity: PT/OT per Home Health. Fall precautions Diet: Regular Diet Follow-up Information Follow up With Specialties Details Why Contact Info Yanci Morocho MD Internal Medicine   [address] [place] [id number] [phone] Signed: Mykel Farley MD

## 2019-01-02 NOTE — PROGRESS NOTES
Report received from [facility]. PM assessment completed. Pt is alert to name only. Normal unlabored respirations present on RA. IV site is CDI and saline locked. Tele monitor is on. Bed alarm is on with bed low and locked and call light in reach. Will continue to monitor.

## 2019-01-02 NOTE — PHYSICIAN ADVISORY
Letter of Determination: Upgrade from Observation to Inpatient Status This patient was originally hospitalized as Outpatient Status with Observation Services on 12/31/2018 for acute kidney injury. This patient now meets for Inpatient Admission in accordance with CMS regulation Section 43 .3. Specifically, patient's stay is now over Two Midnights and was medically necessary. The patient's stay was medically necessary based on extreme advanced age, serum creatinine of 1.98 mg/dl, lactic acid of 1.98 mmol/L, and vital signs with blood pressure to 90/39 mmHg, and respiratory rate of 39 breaths per minute. Consistent with CMS guidelines, patient meets for inpatient status. It is our recommendation that this patient's hospitalization status should be upgraded from OBSERVATION to INPATIENT status.  
  
The final decision regarding the patient's hospitalization status depends on the attending physician's judgement. Jama Krishnamurthy MD, ALEM, Physician Advisor [id number] [facility].

## 2019-01-02 NOTE — PROGRESS NOTES
Problem: Mobility Impaired (Adult and Pediatric) Goal: *Therapy Goal (Edit Goal, Insert Text) STG: 
(1.)Mr. Lan Barker will move from supine to sit and sit to supine  with INDEPENDENT within 1-2 treatment day(s). (2.)Mr. Lan Barker will transfer from bed to chair and chair to bed with INDEPENDENT using the least restrictive device within 1-2 treatment day(s). (3.)Mr. Lan Barker will ambulate with INDEPENDENT for 100 feet with the least restrictive device within 1-2 treatment day(s). LTG: 
(1.)Mr. Lan Barker will ambulate with INDEPENDENT for 150-250 feet with the least restrictive device within 3-7 treatment day(s). ________________________________________________________________________________________________ Outcome: Progressing Towards Goal 
 
PHYSICAL THERAPY: Daily Note, Treatment Day: 1st, AM 1/2/2019INPATIENT: Hospital Day: 3 [place] / Product Type: Medicare /  
  
NAME/AGE/GENDER: Syl Ovalles is a 80 y.o. male PRIMARY DIAGNOSIS: Acute kidney injury ([facility] [address] CAP (community acquired pneumonia) Memory change Acute kidney injury ([facility] [address] Acute kidney injury ([facility] [address] ICD-10: Treatment Diagnosis: · Difficulty in walking, Not elsewhere classified (R26.2) · Other abnormalities of gait and mobility (R26.89) Precaution/Allergies: 
Patient has no known allergies. ASSESSMENT:  
Mr. Lan Barker presents with decreased independence with functional mobility. Pt performed sit to stand. Pt ambulated in hallway. Left pt up in bedside chair with needs in reach. Pt appears confused, which appears to be pt's baseline. 1/2--Pt performed supine to sit. Pt stood and ambulated in hallway. Pt returned supine. Needs in reach. Pt's family present. This section established at most recent assessment PROBLEM LIST (Impairments causing functional limitations): 1. Decreased Strength 2. Decreased Transfer Abilities 3. Decreased Ambulation Ability/Technique 4. Decreased Balance 5. Increased Pain 6. Decreased Activity Tolerance 7. Decreased Flexibility/Joint Mobility INTERVENTIONS PLANNED: (Benefits and precautions of physical therapy have been discussed with the patient.) 1. Bed Mobility 2. Gait Training 3. Therapeutic Activites 4. Transfer Training TREATMENT PLAN: Frequency/Duration: daily for duration of hospital stay Rehabilitation Potential For Stated Goals: Good RECOMMENDED REHABILITATION/EQUIPMENT: (at time of discharge pending progress): Due to the probability of continued deficits (see above) this patient will likely need continued skilled physical therapy after discharge. Equipment:  
? None at this time HISTORY:  
History of Present Injury/Illness (Reason for Referral): Pt with decreased mobility, secondary to above diagnosis. Past Medical History/Comorbidities:  
Mr. Violet Correa  has a past medical history of Diverticulitis, Essential (primary) hypertension, GERD (gastroesophageal reflux disease), Hyperlipidemia, Memory loss, Osteoporosis, Pulmonary embolism ([address] Varicella, and Volvulus of colon ([facility] [address] Mr. Violet Correa  has a past surgical history that includes hx lap cholecystectomy; hx hernia repair; and hx tonsil and adenoidectomy. Social History/Living Environment:  
Home Environment: Assisted living Care [facility] Residence: One story Living Alone: No 
Support Systems: Child(munira), Family member(s), Assisted living Patient Expects to be Discharged to[de-identified] Assisted living Current DME Used/Available at Home: None Prior Level of Function/Work/Activity: 
[place] with ambulation. Number of Personal Factors/Comorbidities that affect the Plan of Care: 0: LOW COMPLEXITY EXAMINATION:  
Most Recent Physical Functioning:  
Gross Assessment: 
AROM: Generally decreased, functional 
Strength: Generally decreased, functional 
Coordination: Generally decreased, functional 
Tone: Normal 
Sensation: Intact Posture: 
Posture (WDL): Within defined limits Balance: 
Sitting: Intact Standing: Intact Bed Mobility: 
Supine to Sit: Stand-by assistance Sit to Supine: Stand-by assistance Wheelchair Mobility: 
  
Transfers: 
Sit to Stand: Contact guard assistance Stand to Sit: Contact guard assistance Gait: 
  
Distance (ft): 650 Feet (ft) Ambulation - Level of Assistance: Stand-by assistance Body Structures Involved: 1. Bones 2. Joints 3. Muscles 4. Ligaments Body Functions Affected: 1. Neuromusculoskeletal 
2. Movement Related Activities and Participation Affected: 1. Mobility Number of elements that affect the Plan of Care: 4+: HIGH COMPLEXITY CLINICAL PRESENTATION:  
Presentation: Stable and uncomplicated: LOW COMPLEXITY CLINICAL DECISION MAKING:  
[facility] 6 Clicks Basic Mobility Inpatient Short Form How much difficulty does the patient currently have. .. Unable A Lot A Little None 1. Turning over in bed (including adjusting bedclothes, sheets and blankets)? [] 1   [] 2   [x] 3   [] 4  
2. Sitting down on and standing up from a chair with arms ( e.g., wheelchair, bedside commode, etc.)   [] 1   [] 2   [x] 3   [] 4  
3. Moving from lying on back to sitting on the side of the bed? [] 1   [] 2   [x] 3   [] 4 How much help from another person does the patient currently need. .. Total A Lot A Little None 4. Moving to and from a bed to a chair (including a wheelchair)? [] 1   [] 2   [x] 3   [] 4  
5. Need to walk in hospital room? [] 1   [] 2   [x] 3   [] 4  
6. Climbing 3-5 steps with a railing? [] 1   [] 2   [x] 3   [] 4  
© 2007, Trustees of [facility], under license to [employer]. All rights reserved Score:  Initial: 18 Most Recent: X (Date: -- ) Interpretation of Tool:  Represents activities that are increasingly more difficult (i.e. Bed mobility, Transfers, Gait). Score 24 23 22-20 19-15 14-10 9-7 6  Modifier CH CI CJ CK CL CM CN   
 
 ? Mobility - Walking and Moving Around:  
  - CURRENT STATUS: CK - 40%-59% impaired, limited or restricted  - GOAL STATUS: CK - 40%-59% impaired, limited or restricted  - D/C STATUS:  ---------------To be determined--------------- [place] / [place] / Product Type: Medicare /   
 
Medical Necessity:    
· Skilled intervention continues to be required due to decreased mobility ability. Reason for Services/Other Comments: 
· Patient continues to require skilled intervention due to decreased mobility ability. Use of outcome tool(s) and clinical judgement create a POC that gives a: Clear prediction of patient's progress: LOW COMPLEXITY  
  
 
 
 
TREATMENT:  
(In addition to Assessment/Re-Assessment sessions the following treatments were rendered) Pre-treatment Symptoms/Complaints:   
Pain: Initial:  
Pain Intensity 1: 0  Post Session:  No complaints Gait Training (  15 minutes):  Gait training to improve and/or restore physical functioning as related to mobility. Ambulated 650 Feet (ft) with Stand-by assistance. Therapeutic Activity: (    15 minutes): Therapeutic activities including ambulation to improve mobility. Braces/Orthotics/Lines/Etc:  
· O2 Device: Room air Treatment/Session Assessment:   
· Response to Treatment:  Pt agreeable to ambulate in hallway. · Interdisciplinary Collaboration:  
o Physical Therapist 
o Registered Nurse · After treatment position/precautions:  
o Supine in bed 
o Bed/Chair-wheels locked 
o Bed in low position 
o Call light within reach 
o RN notified · Compliance with Program/Exercises: Compliant most of the time · Recommendations/Intent for next treatment session: \"Next visit will focus on advancements to more challenging activities and reduction in assistance provided\". Total Treatment Duration: PT Patient Time In/Time Out Time In: 3693 Time Out: 1100 Zain Mckeon PT

## 2019-01-02 NOTE — DISCHARGE INSTRUCTIONS
Disposition: home  Activity: PT/OT per Home Health. Fall precautions  Diet: Regular Diet        DISCHARGE SUMMARY from Nurse    PATIENT INSTRUCTIONS:    After general anesthesia or intravenous sedation, for 24 hours or while taking prescription Narcotics:  · Limit your activities  · Do not drive and operate hazardous machinery  · Do not make important personal or business decisions  · Do  not drink alcoholic beverages  · If you have not urinated within 8 hours after discharge, please contact your surgeon on call. Report the following to your surgeon:  · Excessive pain, swelling, redness or odor of or around the surgical area  · Temperature over 100.5  · Nausea and vomiting lasting longer than 4 hours or if unable to take medications  · Any signs of decreased circulation or nerve impairment to extremity: change in color, persistent  numbness, tingling, coldness or increase pain  · Any questions    What to do at Home:  Recommended activity: Activity as tolerated, follow up with PCP next week    If you experience any of the following symptoms worsening confusion, fever, chills, nausea/vomiting, any other concerns return to Er and, please follow up with PCP. *  Please give a list of your current medications to your Primary Care Provider. *  Please update this list whenever your medications are discontinued, doses are      changed, or new medications (including over-the-counter products) are added. *  Please carry medication information at all times in case of emergency situations. These are general instructions for a healthy lifestyle:    No smoking/ No tobacco products/ Avoid exposure to second hand smoke  Surgeon General's Warning:  Quitting smoking now greatly reduces serious risk to your health.     Obesity, smoking, and sedentary lifestyle greatly increases your risk for illness    A healthy diet, regular physical exercise & weight monitoring are important for maintaining a healthy lifestyle    You may be retaining fluid if you have a history of heart failure or if you experience any of the following symptoms:  Weight gain of 3 pounds or more overnight or 5 pounds in a week, increased swelling in our hands or feet or shortness of breath while lying flat in bed. Please call your doctor as soon as you notice any of these symptoms; do not wait until your next office visit. Recognize signs and symptoms of STROKE:    F-face looks uneven    A-arms unable to move or move unevenly    S-speech slurred or non-existent    T-time-call 911 as soon as signs and symptoms begin-DO NOT go       Back to bed or wait to see if you get better-TIME IS BRAIN. Warning Signs of HEART ATTACK     Call 911 if you have these symptoms:   Chest discomfort. Most heart attacks involve discomfort in the center of the chest that lasts more than a few minutes, or that goes away and comes back. It can feel like uncomfortable pressure, squeezing, fullness, or pain.  Discomfort in other areas of the upper body. Symptoms can include pain or discomfort in one or both arms, the back, neck, jaw, or stomach.  Shortness of breath with or without chest discomfort.  Other signs may include breaking out in a cold sweat, nausea, or lightheadedness. Don't wait more than five minutes to call 911 - MINUTES MATTER! Fast action can save your life. Calling 911 is almost always the fastest way to get lifesaving treatment. Emergency Medical Services staff can begin treatment when they arrive -- up to an hour sooner than if someone gets to the hospital by car. The discharge information has been reviewed with the patient. The patient verbalized understanding. Discharge medications reviewed with the patient and appropriate educational materials and side effects teaching were provided.   ___________________________________________________________________________________________________________________________________         Acute Kidney Injury in Children: Care Instructions  Your Care Instructions  Acute kidney injury (PANKAJ) is a sudden decrease in kidney function. This can happen over a period of hours, days or, in some cases, weeks. PANKAJ used to be called acute renal failure. But kidney failure doesn't always happen with PANKAJ. Common causes of PANKAJ are dehydration, blood loss, and medicines. When PANKAJ happens, the kidneys have trouble removing waste and excess fluids from the body. The waste and fluids build up and become harmful. Kidney function may go back to normal if the cause is treated. Your child's chance of a full recovery depends on what caused the PANKAJ. It also depends on what other medical problems your child has. A machine may help your child's kidneys remove waste and fluids for a short time. This is called dialysis. Follow-up care is a key part of your child's treatment and safety. Be sure to make and go to all appointments, and call your doctor if your child is having problems. It's also a good idea to know your child's test results and keep a list of the medicines your child takes. How can you care for your child at home? · Talk to your doctor about how much fluid your child should drink. · Be sure your child eats a healthy diet. Talk to your doctor or a dietitian about what type of diet may be best for your child. Your child may need to limit sodium, potassium, and phosphorus. · If your child needs dialysis, follow the instructions and schedule for dialysis that your doctor gives you. · Be safe with medicines. Read and follow all instructions on the label. Review all of your child's medicines with your doctor. Do not give your child any medicines, including nonsteroidal anti-inflammatory drugs (NSAIDs) such as ibuprofen (Advil, Motrin), unless your doctor says it is safe to do so. · Make sure that anyone who treats your child for any health problem knows that your child has had PANKAJ. When should you call for help?   Call 911 anytime you think your child may need emergency care. For example, call if:    · Your child passes out (loses consciousness).    Call your doctor now or seek immediate medical care if:    · Your child has new or worse nausea and vomiting.     · Your child has much less urine than normal or has no urine.     · Your child is feeling confused or cannot think clearly.     · Your child has new or more blood in his or her urine.     · Your child has new swelling.     · Your child is dizzy or lightheaded, or feels like he or she may faint.    Watch closely for changes in your child's health, and be sure to contact your doctor if:    · Your child does not get better as expected. Where can you learn more? Go to http://red-alena.info/. Enter T559 in the search box to learn more about \"Acute Kidney Injury in Children: Care Instructions. \"  Current as of: March 15, 2018  Content Version: 11.8  © 4553-7767 [employer]. Care instructions adapted under license by [employer] (which disclaims liability or warranty for this information). If you have questions about a medical condition or this instruction, always ask your healthcare professional. [place] [address] any warranty or liability for your use of this information.

## 2019-01-02 NOTE — PROGRESS NOTES
Starr reviewed chart and met with pt's family members. Pt is a 80 yr old gentleman adm on 12/31 due to feeling like he was going to pass out. Pt lives at [facility] and has for about a yr and a half. Son informed pt used all his Medicare paid visits with PT recently. Pt ambulated 125' with PT here. Family hires someone to [facility] in on him-literally two 15 min visits\" daily. She makes sure he has taken his meds and asks if he needs anything. Starr talked with Md and then with family who are concerned about his confusion. Starr suggestion was to increase the amount of time pt is supervised. Starr encouraged family to stay with pt as much as possible. Son informed they would increase the care he is getting. Family mentioned his speech is better since he's received ST here in hospital. Md feels pt is medically stable. Family made aware of possible discharge today. Danielle Russell

## 2019-01-02 NOTE — PROGRESS NOTES
Problem: Self Care Deficits Care Plan (Adult) Goal: *Acute Goals and Plan of Care (Insert Text) 1. Patient will perform grooming with independence. Met 1/2/2019 2. Patient will perform Upper body dressing with modified independence. Progressing 1/2/2019 3. Patient will perform lower body dressing with modified independence. Progressing 1/2/2019 4. Patient will perform upper and lower body modified independence. 5. Patient will perform toilet transfers with SBA. progressing 1/2/2019 6. Patient will perform shower transfer with SBA. 7. Patient will participate in 30 + minutes of ADL/ therapeutic exercise/therapeutic activity with min rest breaks to increase activity tolerance for self care. 8. Patient will perform ADL functional mobility in room with SBA. Goals to be achieved in 7 days. OCCUPATIONAL THERAPY: Daily Note, Treatment Day: 1st and AM 1/2/2019INPATIENT: Hospital Day: 3 [place] / Product Type: Medicare /  
  
NAME/AGE/GENDER: Mark Acevedo is a 80 y.o. male PRIMARY DIAGNOSIS:  Acute kidney injury ([facility] [address] CAP (community acquired pneumonia) Memory change Acute kidney injury ([facility] [address] Acute kidney injury ([facility] [address] ICD-10: Treatment Diagnosis:  
 · Other lack of cordination (R27.8) · Difficulty in walking, Not elsewhere classified (R26.2) Precautions/Allergies: 
   Patient has no known allergies. ASSESSMENT:  
Mr. Yoandy Shine sitting up in recliner on contact, posey pad intact. Pt agreeable to OT treatment. Pt completed bathing, toileting, grooming and functional mobility this am. See chart below for assist. Pt ambulated in hallway with CGA and RW. Pt progressing well. Pt pleasant but confused. Pt left sitting in recliner with posey pad intact. Continue OT. This section established at most recent assessment PROBLEM LIST (Impairments causing functional limitations): 1. Decreased Strength 2. Decreased ADL/Functional Activities 3. Decreased Transfer Abilities 4. Decreased Ambulation Ability/Technique 5. Decreased Balance 6. Decreased Activity Tolerance INTERVENTIONS PLANNED: (Benefits and precautions of occupational therapy have been discussed with the patient.) 1. Activities of daily living training 2. Adaptive equipment training 3. Balance training 4. Clothing management 5. Therapeutic activity 6. Therapeutic exercise TREATMENT PLAN: Frequency/Duration: Follow patient 3x/week to address above goals. Rehabilitation Potential For Stated Goals: Good RECOMMENDED REHABILITATION/EQUIPMENT: (at time of discharge pending progress): Due to the probability of continued deficits (see above) this patient will likely need continued skilled occupational therapy after discharge. Equipment:  
? None at this time OCCUPATIONAL PROFILE AND HISTORY:  
History of Present Injury/Illness (Reason for Referral): 
79 yo CM with past history of HTN, HLD, osteoporosis, pulmonary embolism (not on anticoagulation), and GERD presents from independent living facility [facility]) after observed GLF while patient was walking back to his room earlier today. Patient grabbed onto his door and felt like he was falling down, was caught by a bystander, patient describes that he felt \"frozen. \" He felt lightheaded but did not pass out. Of note, patient was hospitalized for a similarly occurring episode in February 2018 with negative inpatient stroke workup. He denies chest pain, headache, shortness of breath, abdominal pain, or diarrhea. Family at bedside states that he has been getting more confused lately and has gotten lost trying to go to breakfast \"and then he gets very anxious. \"  
 
 
Past Medical History/Comorbidities:  
Mr. Caryl Levin  has a past medical history of Diverticulitis, Essential (primary) hypertension, GERD (gastroesophageal reflux disease), Hyperlipidemia, Memory loss, Osteoporosis, Pulmonary embolism ([address] Varicella, and Volvulus of colon ([facility] [address] Mr. Lan Barker  has a past surgical history that includes hx lap cholecystectomy; hx hernia repair; and hx tonsil and adenoidectomy. Social History/Living Environment:  
Home Environment: Assisted living Care Facility Name: [facility] One/Two Story Residence: One story Living Alone: No 
Support Systems: Child(munira), Family member(s), Assisted living Patient Expects to be Discharged to[de-identified] Assisted living Current DME Used/Available at Home: None Prior Level of Function/Work/Activity: 
Lives at [facility]; independent with ADLs and used walker for ambulation Number of Personal Factors/Comorbidities that affect the Plan of Care: Brief history (0):  LOW COMPLEXITY ASSESSMENT OF OCCUPATIONAL PERFORMANCE[de-identified]  
Activities of Daily Living:  
Basic ADLs (From Assessment) Complex ADLs (From Assessment) Feeding: Setup Oral Facial Hygiene/Grooming: Setup Bathing: Contact guard assistance Upper Body Dressing: Setup Lower Body Dressing: Setup Toileting: Setup Grooming/Bathing/Dressing Activities of Daily Living Grooming Grooming Assistance: Supervision/set up Washing Face: Supervision/set-up Cues: Verbal cues provided; Tactile cues provided Cognitive Retraining Safety/Judgement: Fall prevention Upper Body Bathing Bathing Assistance: Supervision/set-up Position Performed: Seated in chair Cues: Verbal cues provided; Tactile cues provided Feeding Feeding Assistance: Supervision/set-up Lower Body Bathing Perineal  : Minimum assistance Position Performed: Seated in chair;Standing Cues: Verbal cues provided; Tactile cues provided Adaptive Equipment: Mosetta Terral Lower Body : Minimum assistance Position Performed: Bending forward method;Seated in chair Cues: Verbal cues provided; Tactile cues provided Toileting Toileting Assistance: Supervision/set up Upper Body Dressing Assistance Dressing Assistance: Minimum assistance Hospital Gown: Minimum  assistance Functional Transfers Bathroom Mobility: Contact guard assistance Toilet Transfer : Contact guard assistance Adaptive Equipment: Manuel Wolf (comment) Lower Body Dressing Assistance Dressing Assistance: Minimum assistance Underpants: Minimum assistance Socks: Compensatory technique training( socks) Bed/Mat Mobility Sit to Stand: Contact guard assistance Bed to Chair: Contact guard assistance Most Recent Physical Functioning:  
Gross Assessment: 
AROM: Generally decreased, functional(BUE) Strength: Generally decreased, functional(BUE) Coordination: Generally decreased, functional(BUE) Tone: Normal 
Sensation: Intact Posture: 
Posture (WDL): Within defined limits Balance: 
Sitting: Intact Standing: With support Bed Mobility: 
  
Wheelchair Mobility: 
  
Transfers: 
Sit to Stand: Contact guard assistance Bed to Chair: Contact guard assistance Patient Vitals for the past 6 hrs: 
 BP BP Patient Position SpO2 Pulse 01/02/19 0810 120/62 At rest 95 % 65 Mental Status Neurologic State: Alert, Confused Orientation Level: Oriented to person Cognition: Follows commands Perception: Appears intact Perseveration: No perseveration noted Safety/Judgement: Fall prevention Physical Skills Involved: 
1. Balance 2. Strength 3. Activity Tolerance Cognitive Skills Affected (resulting in the inability to perform in a timely and safe manner): 1. Perception 2. Short Term Recall 3. Long Term Memory Psychosocial Skills Affected: 1. Habits/Routines Number of elements that affect the Plan of Care: 5+:  HIGH COMPLEXITY CLINICAL DECISION MAKING:  
[employer] AM-PAC 6 Clicks Daily Activity Inpatient Short Form How much help from another person does the patient currently need. .. Total A Lot A Little None 1. Putting on and taking off regular lower body clothing?    [] 1   [] 2   [x] 3   [] 4  
 2.  Bathing (including washing, rinsing, drying)? [] 1   [] 2   [x] 3   [] 4  
3. Toileting, which includes using toilet, bedpan or urinal?   [] 1   [] 2   [] 3   [x] 4  
4. Putting on and taking off regular upper body clothing? [] 1   [] 2   [] 3   [x] 4  
5. Taking care of personal grooming such as brushing teeth? [] 1   [] 2   [] 3   [x] 4  
6. Eating meals? [] 1   [] 2   [] 3   [x] 4  
© 2007, Trustees of [facility] [employer], under license to [employer]. All rights reserved Score:  Initial: 22 Most Recent: X (Date: -- ) Interpretation of Tool:  Represents activities that are increasingly more difficult (i.e. Bed mobility, Transfers, Gait). Score 24 23 22-20 19-15 14-10 9-7 6 Modifier CH CI CJ CK CL CM CN   
 
? Self Care:  
  - CURRENT STATUS: CJ - 20%-39% impaired, limited or restricted  - GOAL STATUS: CI - 1%-19% impaired, limited or restricted  - D/C STATUS:  CJ - 20%-39% impaired, limited or restricted [place] / [place] / Product Type: Medicare /   
 
Medical Necessity:    
· Patient is expected to demonstrate progress in strength, balance, coordination and functional technique to increase independence with self care and functional mobility. Reason for Services/Other Comments: 
· Patient continues to require skilled intervention due to decrease self care and functional mobility. Use of outcome tool(s) and clinical judgement create a POC that gives a: LOW COMPLEXITY  
 
 
 
TREATMENT:  
(In addition to Assessment/Re-Assessment sessions the following treatments were rendered) Pre-treatment Symptoms/Complaints: Tolerated ambulating in hallway Pain: Initial:  
Pain Intensity 1: 0  Post Session:  0 Self Care: (40): Procedure(s) (per grid) utilized to improve and/or restore self-care/home management as related to dressing, bathing, toileting and grooming.  Required minimal verbal and   cueing to facilitate activities of daily living skills and compensatory activities. Braces/Orthotics/Lines/Etc:  
· O2 Device: Room air Treatment/Session Assessment:   
· Response to Treatment:  Tolerated well · Interdisciplinary Collaboration:  
o Registered Nurse · After treatment position/precautions:  
o Up in chair 
o Bed alarm/tab alert on 
o Bed/Chair-wheels locked 
o Call light within reach 
o RN notified · Compliance with Program/Exercises: Compliant all of the time. · Recommendations/Intent for next treatment session: \"Next visit will focus on advancements to more challenging activities and reduction in assistance provided\". Total Treatment Duration: OT Patient Time In/Time Out Time In: 0745 Time Out: 0825 Rachael Frank OT

## 2019-01-02 NOTE — PROGRESS NOTES
Shift assessment complete. Pt resting in chair. Pt confused alert to person only. Respirations present, even, unlabored. Lung sounds clear to auscultation. HR regular, S1&S2 auscultated. Tele box in place. Call light within reach, chair wheels locked, chair alarm on. Will continue to monitor throughout the shift.

## 2019-01-03 NOTE — PROGRESS NOTES
This note will not be viewable in [address] Date/Time of Call: 
 01/03/19 236pm  
What was the patient hospitalized for? PANKAJ Consent for RENUKA GARG Call Does the patient understand his/her diagnosis and/or treatment and what happened during the hospitalization? Patients daughter in law agrees to call Yes Did the patient receive discharge instructions? Yes  
CM Assessed Risk for Readmission:  
 
 
Patient stated Risk for Readmission:  
 Patient is a moderate risk for readmission per Case Management assessment Patients daughter in law states that she is not concerned about readmission at this time. [facility] Ask patient if they understand these. Do they have any questions? DC instructions reviewed Were home services ordered (nursing, PT, OT, ST, etc.)? No   
If so, has the first visit occurred? If not, why? (Assist with coordination of services if necessary. ) 
 NA Was any DME ordered? No   
If so, has it been received? If not, why?  (Assist patient in obtaining DME orders &/or equipment if necessary. ) NA Complete a review of all medications (new, continued and discontinued meds per the D/C instructions and medication tab in [facility]). Reviewed medications with patients daughter in law. Were all new prescriptions filled? If not, why?  (Assist patient in obtaining medications if necessary  escalate for CCM &/or SW if ongoing issues are verbalized by pt or anticipated) Yes Does the patient understand the purpose and dosing instructions for all medications? (If patient has questions, provide explanation and education.) Patients daughter verbalizes understanding of all of the patients medications. Does the patient have any problems in performing ADLs? (If patient is unable to perform ADLs  what is the limiting factor(s)?   Do they have a support system that can assist? If no support system is present, discuss possible assistance that they may be able to obtain. Escalate for CCM/SW if ongoing issues are verbalized by pt or anticipated) Patient lives independently in his apartment and has a caregiver stop by for 15 minutes 2 times daily Does the patient have all follow-up appointments scheduled? 7 day f/up with PCP?  
(f/up with PCP may be w/in 14 days if patient has a f/up with their specialist w/in 7 days) 7-14 day f/up with specialist?  
(or per discharge instructions) If f/up has not been made  what actions has the care coordinator made to accomplish this? Has transportation been arranged? yes  
 
 
01/08/19 @ 11am 
 
 
 
NA 
 
 
NA Patients family will transport patient to his appointments Any other questions or concerns expressed by the patient? Patients daughter in law denies any questions or concerns at this time. Care Coordinator contact information provided should anything arise that the patient needs assistance with. Schedule next appointment with RENUKA GARG Coordinator or refer to RN Case Manager/ per the workflow guidelines. When is care coordinators next follow-up call scheduled? If referred for CCM  what RN care manager was the referral assigned? Within 30 days Within 30 days NA   
NAHUN Call Completed By: Carissa Cardoso CMA Care Coordinator

## 2019-01-08 PROBLEM — N17.9 ACUTE KIDNEY INJURY (HCC): Status: RESOLVED | Noted: 2018-01-01 | Resolved: 2019-01-01

## 2019-01-08 PROBLEM — J18.9 CAP (COMMUNITY ACQUIRED PNEUMONIA): Status: RESOLVED | Noted: 2018-01-01 | Resolved: 2019-01-01

## 2019-01-24 NOTE — PROGRESS NOTES
This note will not be viewable in [address] Called and spoke with patient's daughter-in-law Eliu Dave for 30 day FU. She states that the patient is doing pretty good. He has seen Dr. Serafin Jacobo for his FU. He has his medications and transportation. She denies any questions or concerns at this time. Episode closed at this time as no further needs are identified.

## 2019-02-14 PROBLEM — R10.9 ABDOMINAL PAIN: Status: ACTIVE | Noted: 2019-01-01

## 2019-02-14 PROBLEM — K59.00 CONSTIPATION: Status: ACTIVE | Noted: 2019-01-01

## 2019-02-14 NOTE — H&P
Hospitalist H&P Note Admit Date:  2019 12:52 PM  
Name:  Eileen Shea Age:  80 y.o. 
:  1920 MRN:  [id number] PCP:  Maris Baeza MD 
 
 
HPI/Subjective:  
Mr. Nesha Mosley is a 81 y/o WM with a h/o HTN, GERD, HLD who presents today with severe abdominal pain that started earlier today. Son and DIL are present and provide the history. He called his son today and said he had abdominal pain and didn't feel up to going to breakfast. He is apparently a stoic individual and rarely complaints, so this was alarming to them. His pain then progressed and became so severe to the point where he had difficulty moving so he was brought to the ED. Patient says his last BM was a few days ago. No urinary troubles lately and no h/o BPH or other prostate issues. Lactic acid is normal at 1.29; sCr is 1.36 (baseline 1-1.3). He has diffuse abdominal tenderness. CT a/p with contrast shows mildly enlared prostate, distended bladder, constipation and aortic atherosclerosis but no evidence of bowel obstruction/infraction, kidney stones or pyelonephritis. Surgery was consulted given his diffuse tenderness. Gary catheter was just placed and he has already put out 600cc. 10 systems reviewed and negative except as noted in HPI. Past Medical History:  
Diagnosis Date  Acute kidney injury ([address] 2018  CAP (community acquired pneumonia) 2018  Diverticulitis  Essential (primary) hypertension  GERD (gastroesophageal reflux disease)  Hyperlipidemia  Memory loss  Osteoporosis  Pulmonary embolism ([address]   
 only one episode with simultaneous DVT. Patient and family deny hypercoaguable work up  Varicella  Volvulus of colon ([address] Past Surgical History:  
Procedure Laterality Date  HX HERNIA REPAIR    
 HX LAP CHOLECYSTECTOMY  HX TONSIL AND ADENOIDECTOMY No Known Allergies Social History Tobacco Use  Smoking status: Former Smoker Last attempt to quit: 1980 Years since quittin.1  Smokeless tobacco: Never Used Substance Use Topics  Alcohol use: No  
  Alcohol/week: 0.0 oz Frequency: Never Comment: daily Family History Problem Relation Age of Onset  Diabetes Mother Immunization History Administered Date(s) Administered  Influenza High Dose Vaccine PF 2017, 10/30/2018  Influenza Vaccine 10/01/2016  Pneumococcal Conjugate (PCV-13) 10/01/2015  Pneumococcal Polysaccharide (PPSV-23) 10/01/2016  Tdap 2017 PTA Medications: 
Prior to Admission Medications Prescriptions Last Dose Informant Patient Reported? Taking?  
aspirin 81 mg chewable tablet   Yes Yes Sig: Take 81 mg by mouth daily. buPROPion XL (WELLBUTRIN XL) 150 mg tablet   No Yes Sig: Take 1 Tab by mouth every morning. cyanocobalamin (VITAMIN B12) 500 mcg tablet   No Yes Sig: Take 1 Tab by mouth daily. lovastatin (MEVACOR) 40 mg tablet   No Yes Sig: Take 1 Tab by mouth nightly. omeprazole (PRILOSEC) 20 mg capsule   No Yes Sig: Take 1 Cap by mouth daily. Facility-Administered Medications: None Objective:  
 
Patient Vitals for the past 24 hrs: 
 Temp Pulse Resp BP SpO2  
19 1727  78 18 161/72 98 % 19 1258 98.4 °F (36.9 °C) 60 17 142/49 96 % Oxygen Therapy O2 Sat (%): 98 % (19 1727) O2 Device: Room air (19 172) Intake/Output Summary (Last 24 hours) at 2019 1748 Last data filed at 2019 1008 Gross per 24 hour Intake  Output 700 ml Net -700 ml *Note that automatically entered I/Os may not be accurate; dependent on patient compliance with collection and accurate  by assistants. Physical Exam: 
General:    Well nourished. Alert. In pain. Eyes:   Normal sclera. Extraocular movements intact. HENT:  Normocephalic, atraumatic. Moist mucous membranes CV: RRR. No m/r/g. Peripheral pulses 2+. Capillary refill <2s. Lungs:  CTAB. No wheezing, rhonchi, or rales. Abdomen: Diffuse tenderness to palpation, more so LLQ. No bruising, rash, erythema. Extremities: Warm and dry. No cyanosis or edema. No rebound. Neurologic: CN II-XII grossly intact. Sensation intact. Skin:     No rashes or jaundice. Normal coloration Psych:  Normal mood and affect. I reviewed the labs, imaging, EKGs, telemetry, and other studies done this admission. Data Review:  
Recent Results (from the past 24 hour(s)) CBC WITH AUTOMATED DIFF Collection Time: 02/14/19  1:23 PM  
Result Value Ref Range WBC 3.6 (L) 4.3 - 11.1 K/uL  
 RBC 3.84 (L) 4.23 - 5.6 M/uL  
 HGB 13.0 (L) 13.6 - 17.2 g/dL HCT 39.1 (L) 41.1 - 50.3 % .8 (H) 79.6 - 97.8 FL  
 MCH 33.9 (H) 26.1 - 32.9 PG  
 MCHC 33.2 31.4 - 35.0 g/dL  
 RDW 13.0 11.9 - 14.6 % PLATELET 197 750 - 267 K/uL MPV 8.8 (L) 9.4 - 12.3 FL ABSOLUTE NRBC 0.00 0.0 - 0.2 K/uL  
 DF AUTOMATED NEUTROPHILS 72 43 - 78 % LYMPHOCYTES 18 13 - 44 % MONOCYTES 8 4.0 - 12.0 % EOSINOPHILS 1 0.5 - 7.8 % BASOPHILS 1 0.0 - 2.0 % IMMATURE GRANULOCYTES 0 0.0 - 5.0 %  
 ABS. NEUTROPHILS 2.6 1.7 - 8.2 K/UL  
 ABS. LYMPHOCYTES 0.7 0.5 - 4.6 K/UL  
 ABS. MONOCYTES 0.3 0.1 - 1.3 K/UL  
 ABS. EOSINOPHILS 0.0 0.0 - 0.8 K/UL  
 ABS. BASOPHILS 0.0 0.0 - 0.2 K/UL  
 ABS. IMM. GRANS. 0.0 0.0 - 0.5 K/UL METABOLIC PANEL, COMPREHENSIVE Collection Time: 02/14/19  1:23 PM  
Result Value Ref Range Sodium 139 136 - 145 mmol/L Potassium 4.0 3.5 - 5.1 mmol/L Chloride 104 98 - 107 mmol/L  
 CO2 27 21 - 32 mmol/L Anion gap 8 mmol/L Glucose 115 (H) 65 - 100 mg/dL BUN 19 8 - 23 MG/DL Creatinine 1.36 0.8 - 1.5 MG/DL  
 GFR est AA >60 >60 ml/min/1.73m2 GFR est non-AA 51 ml/min/1.73m2 Calcium 9.1 8.3 - 10.4 MG/DL  Bilirubin, total 0.8 0.2 - 1.1 MG/DL  
 ALT (SGPT) 20 12 - 65 U/L  
 AST (SGOT) 18 15 - 37 U/L  
 Alk. phosphatase 106 50 - 136 U/L Protein, total 7.4 g/dL Albumin 3.8 3.2 - 4.6 g/dL Globulin 3.6 (H) 2.3 - 3.5 g/dL A-G Ratio 1.1 PROTHROMBIN TIME + INR Collection Time: 02/14/19  1:23 PM  
Result Value Ref Range Prothrombin time 15.0 (H) 11.7 - 14.5 sec INR 1.2 PTT Collection Time: 02/14/19  1:23 PM  
Result Value Ref Range aPTT 29.0 24.7 - 39.8 SEC  
LIPASE Collection Time: 02/14/19  1:23 PM  
Result Value Ref Range Lipase 152 73 - 393 U/L  
POC LACTIC ACID Collection Time: 02/14/19  1:38 PM  
Result Value Ref Range Lactic Acid (POC) 1.29 0.5 - 1.9 mmol/L All Micro Results None Current Facility-Administered Medications Medication Dose Route Frequency  [START ON 2/15/2019] aspirin chewable tablet 81 mg  81 mg Oral DAILY  buPROPion [facility]) tablet 150 mg  150 mg Oral BID  [START ON 2/15/2019] cyanocobalamin tablet 500 mcg  500 mcg Oral DAILY  atorvastatin (LIPITOR) tablet 20 mg  20 mg Oral QHS  [START ON 2/15/2019] pantoprazole (PROTONIX) tablet 40 mg  40 mg Oral ACB  sodium chloride (NS) flush 5-40 mL  5-40 mL IntraVENous Q8H  
 sodium chloride (NS) flush 5-40 mL  5-40 mL IntraVENous PRN  
 acetaminophen (TYLENOL) tablet 650 mg  650 mg Oral Q4H PRN  
 ondansetron (ZOFRAN) injection 4 mg  4 mg IntraVENous Q4H PRN  
 morphine injection 1 mg  1 mg IntraVENous ONCE  
 morphine injection 1 mg  1 mg IntraVENous Q4H PRN  
 0.9% sodium chloride infusion  75 mL/hr IntraVENous CONTINUOUS Current Outpatient Medications Medication Sig  
 buPROPion XL (WELLBUTRIN XL) 150 mg tablet Take 1 Tab by mouth every morning.  lovastatin (MEVACOR) 40 mg tablet Take 1 Tab by mouth nightly.  omeprazole (PRILOSEC) 20 mg capsule Take 1 Cap by mouth daily.  cyanocobalamin (VITAMIN B12) 500 mcg tablet Take 1 Tab by mouth daily.  aspirin 81 mg chewable tablet Take 81 mg by mouth daily. Other Studies: Ct Abd Pelv W Cont Result Date: 2/14/2019 CT ABDOMEN AND PELVIS WITH CONTRAST. HISTORY: Severe left lower quadrant pain. COMPARISON: None. TECHNIQUE: 5 mm axial scans from above the diaphragms to the pubic symphysis following oral and 100 cc intravenous contrast without acute complication. Intravenous contrast was given to increase the sensitivity to acute inflammation. Radiation dose reduction techniques were used for this study. Our CT scanners use one or more of the following: Automated exposure control, adjustment of the mA and or kV according to patient size, iterative reconstruction. FINDINGS: Abdomen: The lung bases and straight minimal atelectasis. The liver and spleen demonstrates tiny calcifications in the spleen and probable subcentimeter cyst right lobe the liver. . No calcified gallstones. The biliary tree is not dilated. The pancreas is atrophic. No free fluid, acute inflammatory changes or adenopathy. Bowel loops are not dilated. Moderate stool throughout the tendon colon. The kidneys demonstrate appropriate renal cysts. No radiopaque renal calculi. No hydronephrosis. The adrenal glands are normal size. Aorta is normal caliber and calcified. Pelvis: No free fluid or acute inflammatory changes. The urinary bladder unremarkable. Prostate is mildly enlarged. IMPRESSION:  No acute abnormality. Moderate stool throughout the redundant colon. There is aortic atherosclerosis. Assessment and Plan:  
 
Hospital Problems as of 2/14/2019 Date Reviewed: 1/28/2019 Codes Class Noted - Resolved POA Abdominal pain ICD-10-CM: R10.9 ICD-9-CM: 789.00  2/14/2019 - Present Yes * (Principal) Constipation ICD-10-CM: K59.00 ICD-9-CM: 564.00  2/14/2019 - Present Yes Essential (primary) hypertension ICD-10-CM: I10 
ICD-9-CM: 401.9  Unknown - Present Yes Hyperlipidemia ICD-10-CM: E78.5 ICD-9-CM: 272.4  Unknown - Present Yes GERD (gastroesophageal reflux disease) ICD-10-CM: K21.9 ICD-9-CM: 530.81  Unknown - Present Yes Plan: # Constipation with abdominal pain - Surgical recs appreciated - Enema now and one more tonight. Q8 if no response. - If pain persists after bowel clean out may need repeat surgical eval. 
 - Gary cath # HTN 
 - home meds # GERD 
 - PPI # HLD 
 - statin Discharge planning:  Admit to obs. Plan to discharge back to nursing home. DVT ppx: SCDs Code status:  Full Estimated LOS:  Greater than 2 midnights Risk:  high Signed: 
Samy Limon MD

## 2019-02-14 NOTE — PROGRESS NOTES
Patient handed off to me pending CT Abd which was significant only for large constipation. On exam patient is severely tender and unable to get up or even walk due to pain (no leg weakness, pulses 5/5 at [facility]), other considerations were kidney stone however no hydro or visualized stone; Dissection however again normal pulses and strength and non visualized on CT; ischemic bowel however again ct without evidence of this and lactic acid WNL. Given then severity of pain and inability to care for himself due to this pain I Discussed patient with hospitalist and surgery with decision to admit surgery for bowel clean out overnight and surgery to consult for further suggestion and evaluation. Patient and family in agreement with plan.

## 2019-02-14 NOTE — ED TRIAGE NOTES
Pt in via [facility] c/o lower back pain when sitting down to eat. Denies injury. States painful to walk.

## 2019-02-14 NOTE — CONSULTS
Surgery Consult Patient: Daniel Cabrera 80 y.o. male Consulting Physician:  ER/hospitalist 
 
Chief Complaint: abdominal pain Subjective:  
  
Daniel Cabrera is a 80 y.o. male who surgery has been asked to see for evaluation of abdominal pain. This is described as severe. He presented to the ER with left sided back pain but was found on exam to have severe LLQ pain, and some component of diffuse tenderness. Onset was several hours ago. Symptoms have been uncontrolled. Family with him reports no recent problems with constipation, diarrhea, rectal bleeding, abdominal pain, weight loss. Pt is currently fixated on his perceived need to urinate, despite catheter in place. Past Medical History:  
Diagnosis Date  Acute kidney injury ([address] 2018  CAP (community acquired pneumonia) 2018  Diverticulitis  Essential (primary) hypertension  GERD (gastroesophageal reflux disease)  Hyperlipidemia  Memory loss  Osteoporosis  Pulmonary embolism ([address]   
 only one episode with simultaneous DVT. Patient and family deny hypercoaguable work up  Varicella  Volvulus of [address] Past Surgical History:  
Procedure Laterality Date  HX HERNIA REPAIR    
 HX LAP CHOLECYSTECTOMY  HX TONSIL AND ADENOIDECTOMY Family History Problem Relation Age of Onset  Diabetes Mother Social History Socioeconomic History  Marital status:  Spouse name: Not on file  Number of children: Not on file  Years of education: Not on file  Highest education level: Not on file Occupational History  Occupation: Industrial Management Tobacco Use  Smoking status: Former Smoker Last attempt to quit: 1980 Years since quittin.1  Smokeless tobacco: Never Used Substance and Sexual Activity  Alcohol use: No  
  Alcohol/week: 0.0 oz Frequency: Never Comment: daily Current Facility-Administered Medications Medication Dose Route Frequency  polyethylene glycol (MIRALAX) packet 17 g  17 g Oral BID Current Outpatient Medications Medication Sig  
 buPROPion XL (WELLBUTRIN XL) 150 mg tablet Take 1 Tab by mouth every morning.  lovastatin (MEVACOR) 40 mg tablet Take 1 Tab by mouth nightly.  omeprazole (PRILOSEC) 20 mg capsule Take 1 Cap by mouth daily.  cyanocobalamin (VITAMIN B12) 500 mcg tablet Take 1 Tab by mouth daily.  aspirin 81 mg chewable tablet Take 81 mg by mouth daily. No Known Allergies Review of Systems: 
Review of systems not obtained due to patient factors. Objective:  
 
  
Patient Vitals for the past 8 hrs: 
 BP Temp Pulse Resp SpO2 Height Weight  
19 1258 142/49 98.4 °F (36.9 °C) 60 17 96 % 5' 2.2\" (1.58 m) 145 lb (65.8 kg) Temp (24hrs), Av.4 °F (36.9 °C), Min:98.4 °F (36.9 °C), Max:98.4 °F (36.9 °C) Physical Exam: 
GENERAL: alert, distracted, mild distress, appears stated age, EYE: negative findings: anicteric sclera, THROAT & NECK: mucous membranes dry, LUNG: clear to auscultation bilaterally, HEART: regular, ABDOMEN: soft, mildly protuberant but not distended. Significant LLQ tenderness with fullness. No rebound, EXTREMITIES:  No gross deformity, NEUROLOGIC: ONEIL x 4, PSYCHIATRIC: distracted, difficult to re-orient away from fixation on urination Labs:  
Recent Results (from the past 24 hour(s)) CBC WITH AUTOMATED DIFF Collection Time: 19  1:23 PM  
Result Value Ref Range WBC 3.6 (L) 4.3 - 11.1 K/uL  
 RBC 3.84 (L) 4.23 - 5.6 M/uL  
 HGB 13.0 (L) 13.6 - 17.2 g/dL HCT 39.1 (L) 41.1 - 50.3 % .8 (H) 79.6 - 97.8 FL  
 MCH 33.9 (H) 26.1 - 32.9 PG  
 MCHC 33.2 31.4 - 35.0 g/dL  
 RDW 13.0 11.9 - 14.6 % PLATELET 014 408 - 784 K/uL MPV 8.8 (L) 9.4 - 12.3 FL ABSOLUTE NRBC 0.00 0.0 - 0.2 K/uL  
 DF AUTOMATED NEUTROPHILS 72 43 - 78 % LYMPHOCYTES 18 13 - 44 % MONOCYTES 8 4.0 - 12.0 % EOSINOPHILS 1 0.5 - 7.8 % BASOPHILS 1 0.0 - 2.0 % IMMATURE GRANULOCYTES 0 0.0 - 5.0 %  
 ABS. NEUTROPHILS 2.6 1.7 - 8.2 K/UL  
 ABS. LYMPHOCYTES 0.7 0.5 - 4.6 K/UL  
 ABS. MONOCYTES 0.3 0.1 - 1.3 K/UL  
 ABS. EOSINOPHILS 0.0 0.0 - 0.8 K/UL  
 ABS. BASOPHILS 0.0 0.0 - 0.2 K/UL  
 ABS. IMM. GRANS. 0.0 0.0 - 0.5 K/UL METABOLIC PANEL, COMPREHENSIVE Collection Time: 02/14/19  1:23 PM  
Result Value Ref Range Sodium 139 136 - 145 mmol/L Potassium 4.0 3.5 - 5.1 mmol/L Chloride 104 98 - 107 mmol/L  
 CO2 27 21 - 32 mmol/L Anion gap 8 mmol/L Glucose 115 (H) 65 - 100 mg/dL BUN 19 8 - 23 MG/DL Creatinine 1.36 0.8 - 1.5 MG/DL  
 GFR est AA >60 >60 ml/min/1.73m2 GFR est non-AA 51 ml/min/1.73m2 Calcium 9.1 8.3 - 10.4 MG/DL Bilirubin, total 0.8 0.2 - 1.1 MG/DL  
 ALT (SGPT) 20 12 - 65 U/L  
 AST (SGOT) 18 15 - 37 U/L Alk. phosphatase 106 50 - 136 U/L Protein, total 7.4 g/dL Albumin 3.8 3.2 - 4.6 g/dL Globulin 3.6 (H) 2.3 - 3.5 g/dL A-G Ratio 1.1 PROTHROMBIN TIME + INR Collection Time: 02/14/19  1:23 PM  
Result Value Ref Range Prothrombin time 15.0 (H) 11.7 - 14.5 sec INR 1.2 PTT Collection Time: 02/14/19  1:23 PM  
Result Value Ref Range aPTT 29.0 24.7 - 39.8 SEC  
LIPASE Collection Time: 02/14/19  1:23 PM  
Result Value Ref Range Lipase 152 73 - 393 U/L  
POC LACTIC ACID Collection Time: 02/14/19  1:38 PM  
Result Value Ref Range Lactic Acid (POC) 1.29 0.5 - 1.9 mmol/L Data Review CT Results (most recent): 
Results from [facility] Encounter encounter on 02/14/19 CT ABD PELV W CONT Narrative CT ABDOMEN AND PELVIS WITH CONTRAST. HISTORY: Severe left lower quadrant pain. COMPARISON: None. TECHNIQUE: 5 mm axial scans from above the diaphragms to the pubic symphysis 
following oral and 100 cc intravenous contrast without acute complication. Intravenous contrast was given to increase the sensitivity to acute 
inflammation. Radiation dose reduction techniques were used for this study. Our CT scanners use one or more of the following: Automated exposure control, 
adjustment of the mA and or kV according to patient size, iterative 
reconstruction. FINDINGS:  
Abdomen: The lung bases and straight minimal atelectasis. The liver and spleen 
demonstrates tiny calcifications in the spleen and probable subcentimeter cyst 
right lobe the liver. . No calcified gallstones. The biliary tree is not dilated. The pancreas is atrophic. No free fluid, acute inflammatory changes or 
adenopathy. Bowel loops are not dilated. Moderate stool throughout the tendon 
colon. The kidneys demonstrate appropriate renal cysts. No radiopaque renal 
calculi. No hydronephrosis. The adrenal glands are normal size. Aorta is normal 
caliber and calcified. Pelvis: No free fluid or acute inflammatory changes. The urinary bladder 
unremarkable. Prostate is mildly enlarged. Impression IMPRESSION:  No acute abnormality. Moderate stool throughout the redundant 
colon. There is aortic atherosclerosis. Assessment:  
 
ssevere constipation. No evidence of diverticulitis, obstruction, or ischemia. Plan:  
 
Recommend pain control, bowel cleanout- would start with enema(s) then could use oral agents once evacuation begins (even so far as to do a formal colonoscopy prep). If pain persists after bowel cleanout, we would be happy to see him again. Signed By: Delena Alpers, MD   
 February 14, 2019

## 2019-02-14 NOTE — ED PROVIDER NOTES
The history is provided by the patient and a relative. Abdominal Pain This is a new problem. The current episode started 3 to 5 hours ago. The problem occurs hourly. The problem has been gradually worsening. The pain is associated with an unknown factor. The pain is located in the LLQ. The quality of the pain is sharp and shooting. The pain is at a severity of 8/10. The pain is moderate. Pertinent negatives include no anorexia, no fever, no belching, no diarrhea, no flatus, no hematochezia, no melena, no nausea, no vomiting, no constipation, no dysuria, no frequency, no hematuria, no headaches, no arthralgias, no myalgias, no trauma, no chest pain, no testicular pain and no back pain. The pain is worsened by palpation. The pain is relieved by certain positions and recumbency. Past workup includes no CT scan, no ultrasound, no surgery, no esophagogastroduodenoscopy, no UGI, no colonoscopy and no barium enema. His past medical history is significant for diverticulitis. His past medical history does not include PUD, gallstones, GERD, ulcerative colitis, Crohn's disease, irritable bowel syndrome, cancer, UTI, pancreatitis, ectopic pregnancy, ovarian cysts, atrial fibrillation, DM, kidney stones or small bowel obstruction. The patient's surgical history non-contributory. Past Medical History:  
Diagnosis Date  Acute kidney injury ([address] 12/31/2018  CAP (community acquired pneumonia) 12/31/2018  Diverticulitis  Essential (primary) hypertension  GERD (gastroesophageal reflux disease)  Hyperlipidemia  Memory loss  Osteoporosis  Pulmonary embolism ([address]   
 only one episode with simultaneous DVT. Patient and family deny hypercoaguable work up  Varicella  Volvulus of colon ([address] Past Surgical History:  
Procedure Laterality Date  HX HERNIA REPAIR    
 HX LAP CHOLECYSTECTOMY  HX TONSIL AND ADENOIDECTOMY Family History: Problem Relation Age of Onset  Diabetes Mother Social History Socioeconomic History  Marital status:  Spouse name: Not on file  Number of children: Not on file  Years of education: Not on file  Highest education level: Not on file Social Needs  Financial resource strain: Not on file  Food insecurity - worry: Not on file  Food insecurity - inability: Not on file  Transportation needs - medical: Not on file  Transportation needs - non-medical: Not on file Occupational History  Occupation: Industrial Management Tobacco Use  Smoking status: Former Smoker Last attempt to quit: 1980 Years since quittin.1  Smokeless tobacco: Never Used Substance and Sexual Activity  Alcohol use: No  
  Alcohol/week: 0.0 oz Frequency: Never Comment: daily  Drug use: Not on file  Sexual activity: Not on file Other Topics Concern  Not on file Social History Narrative  Not on file ALLERGIES: Patient has no known allergies. Review of Systems Constitutional: Negative for fever. Cardiovascular: Negative for chest pain. Gastrointestinal: Positive for abdominal pain. Negative for anorexia, constipation, diarrhea, flatus, hematochezia, melena, nausea and vomiting. Genitourinary: Negative for dysuria, frequency, hematuria and testicular pain. Musculoskeletal: Negative for arthralgias, back pain and myalgias. Neurological: Negative for headaches. All other systems reviewed and are negative. Vitals:  
 19 1258 BP: 142/49 Pulse: 60 Resp: 17 Temp: 98.4 °F (36.9 °C) SpO2: 96% Weight: 65.8 kg (145 lb) Height: 5' 2.2\" (1.58 m) Physical Exam  
Constitutional: He is oriented to person, place, and time. He appears well-developed and well-nourished. HENT:  
Head: Normocephalic and atraumatic. Eyes: Conjunctivae and EOM are normal. Pupils are equal, round, and reactive to light. Neck: Normal range of motion. Neck supple. Cardiovascular: Normal rate, regular rhythm, normal heart sounds and intact distal pulses. Pulmonary/Chest: Effort normal and breath sounds normal.  
Abdominal: Bowel sounds are normal.  
Exquisitely tender in the left lower quadrant, hypoactive bowel sounds Musculoskeletal: Normal range of motion. He exhibits no deformity. Neurological: He is alert and oriented to person, place, and time. No cranial nerve deficit. Skin: Skin is warm and dry. No rash noted. Specifically inspected the area for rash and there is none Psychiatric: He has a normal mood and affect. His behavior is normal.  
Nursing note and vitals reviewed. MDM Number of Diagnoses or Management Options Abdominal pain, LLQ (left lower quadrant):  
Constipation, unspecified constipation type:  
Diagnosis management comments: 59-year-old male presenting for exquisitely tender left lower quadrant abdominal pain. Concern for diverticulitis, volvulus, kidney stone, ischemic bowel, incarcerated hernia Amount and/or Complexity of Data Reviewed Clinical lab tests: ordered and reviewed (No significant abnormality of the patient's laboratory values) Tests in the radiology section of CPT®: ordered and reviewed Tests in the medicine section of CPT®: ordered and reviewed Decide to obtain previous medical records or to obtain history from someone other than the patient: yes Discuss the patient with other providers: yes (Discussed the case in transitional care to Dr. Precious Simon who will follow-up the patient's CT and determine final disposition) Risk of Complications, Morbidity, and/or Mortality Presenting problems: moderate Diagnostic procedures: moderate Management options: moderate Patient Progress Patient progress: stable ED Course as of Feb 16 0743 u Feb 14, 2019  
1402 Patient's CBC appears stable. Lactic acid is normal which is reassuring  [JS] 1415 Reviewed his CMP which is essentially unremarkable. [JS] ED Course User Index [JS] Vita Nevarez MD  
 
 
Procedures

## 2019-02-15 NOTE — PROGRESS NOTES
Care Management Interventions Mode of Transport at Discharge: Self Transition of Care Consult (CM Consult): Discharge Planning Current Support Network: Lives Alone Confirm Follow Up Transport: Family Plan discussed with Pt/Family/Caregiver: Yes Discharge Location Discharge Placement: Other: 
 
 
Sw reviewed chart and talked with pt's son and daughter in law. Pt is a 80 yr old gentleman who lives in an indep apt at [facility]. Pt is familiar to Sw as have followed on previous adm. Sw suggested last adm for family to increase pt's sitter who at the time only checked on him one time per day. Family has increased this to twice a day for a couple hours. Pt is indep within his space and functions well when there. [address] provides medication reminders, checking to make sure he goes to meals and other basic duties. Family aware of plan to clean out his severely full bowel and then likely d/c in 1-2 days. They state he will return to [facility]. Sw to cont to follow and assist. Quynh Jurist informed pt very confused and attempting to get up constantly. Pt sleeping during Sw discussion as he has been on a bed in the ER hallway for an entire day. Chaka Chase

## 2019-02-15 NOTE — PROGRESS NOTES
Admission assessment completed via doc flow sheet. Patient is alert and oriented x1. Respirations even and unlabored on room air. Lung sounds CTA bilaterally. Heart sounds S1, S2 auscultated and regular. Abdomen distended and tender. Bowel sounds active to all 4 quadrants. Patient c/o pain abd pain and med given at this time. See MAR. Bed is locked and in low position. . Bed rails x 3. Staff at bedside to keep pt from getting out of bed. Patient is encouraged to call for assistance. Call light within reach.

## 2019-02-15 NOTE — PROGRESS NOTES
Pt resting in bed and is alert and oriented to self. He is on room air. RR even and unlabored. IVF infusing. Call light in reach and pt instructed to call for assistance if needed. Will monitor. Bed alarm on.

## 2019-02-15 NOTE — PROGRESS NOTES
Soap maricruz enema completed per MD order. Before enema patient had a large soft and loose BM. He was only able to retain 25cc of enema.

## 2019-02-15 NOTE — PROGRESS NOTES
Spiritual Care Visit, initial visit. Could not visit at this time; Staff was attending patient. Visit by Maddie Gonzalez, Staff .  Jono., Delmis.LESLEY., B.A.

## 2019-02-15 NOTE — PROGRESS NOTES
Problem: Interdisciplinary Rounds Goal: Interdisciplinary Rounds Interdisciplinary team rounds were held 2/15/2019 with the following team members:Care Management, Nursing, Nutrition, Pharmacy, Physical Therapy and Physician and the patient. Plan of care discussed. See clinical pathway and/or care plan for interventions and desired outcomes.

## 2019-02-15 NOTE — PROGRESS NOTES
Hospitalist Progress Note Admit Date:  2019 12:52 PM  
Name:  Nyasia Corporal Age:  80 y.o. 
:  1920 MRN:  [id number] PCP:  Abi Reyes MD 
Treatment Team: Attending Provider: Albertina Summers MD; Utilization Review: Lida Huff RN 
 
HPI/Subjective:  
81 y/o WM admitted on  with severe abdominal pain 2/2 constipation. 2/15: Enema given last night in ED with resultant small BM. Gary still draining. He looks much more comfortable today. He denies abdominal pain currently. No N/V. He is resting comfortably in bed, on admission he was not. Family not present. ROS otherwise negative. Objective:  
 
Patient Vitals for the past 24 hrs: 
 Temp Pulse Resp BP SpO2  
02/15/19 0805 97.4 °F (36.3 °C) 73 24 172/77 98 % 02/15/19 0301 97.1 °F (36.2 °C) 65 18 157/51 96 % 02/15/19 0029 98.1 °F (36.7 °C) 60 18 165/83 94 % 19 2201 98.3 °F (36.8 °C) 65 18 (!) 171/93 98 % 19 98.1 °F (36.7 °C) 80  181/78 97 % 19 1727  78 18 161/72 98 % 19 1258 98.4 °F (36.9 °C) 60 17 142/49 96 % Oxygen Therapy O2 Sat (%): 98 % (02/15/19 0805) Pulse via Oximetry: 72 beats per minute (19) O2 Device: Room air (19) Intake/Output Summary (Last 24 hours) at 2/15/2019 0900 Last data filed at 2/15/2019 0301 Gross per 24 hour Intake  Output 1200 ml Net -1200 ml *Note that automatically entered I/Os may not be accurate; dependent on patient compliance with collection and accurate  by techs. General:    Well nourished. Alert. CV:   RRR. No murmur, rub, or gallop. Lungs:   CTAB. No wheezing, rhonchi, or rales. Abdomen:   Soft, nontender to soft palpation. Decreased BS all quadrants. Extremities: Warm and dry. No cyanosis or edema. Skin:     No rashes or jaundice. Neuro:  No gross focal deficits Data Review: 
I have reviewed all labs, meds, and studies from the last 24 hours: Recent Results (from the past 24 hour(s)) CBC WITH AUTOMATED DIFF Collection Time: 02/14/19  1:23 PM  
Result Value Ref Range WBC 3.6 (L) 4.3 - 11.1 K/uL  
 RBC 3.84 (L) 4.23 - 5.6 M/uL  
 HGB 13.0 (L) 13.6 - 17.2 g/dL HCT 39.1 (L) 41.1 - 50.3 % .8 (H) 79.6 - 97.8 FL  
 MCH 33.9 (H) 26.1 - 32.9 PG  
 MCHC 33.2 31.4 - 35.0 g/dL  
 RDW 13.0 11.9 - 14.6 % PLATELET 320 692 - 795 K/uL MPV 8.8 (L) 9.4 - 12.3 FL ABSOLUTE NRBC 0.00 0.0 - 0.2 K/uL  
 DF AUTOMATED NEUTROPHILS 72 43 - 78 % LYMPHOCYTES 18 13 - 44 % MONOCYTES 8 4.0 - 12.0 % EOSINOPHILS 1 0.5 - 7.8 % BASOPHILS 1 0.0 - 2.0 % IMMATURE GRANULOCYTES 0 0.0 - 5.0 %  
 ABS. NEUTROPHILS 2.6 1.7 - 8.2 K/UL  
 ABS. LYMPHOCYTES 0.7 0.5 - 4.6 K/UL  
 ABS. MONOCYTES 0.3 0.1 - 1.3 K/UL  
 ABS. EOSINOPHILS 0.0 0.0 - 0.8 K/UL  
 ABS. BASOPHILS 0.0 0.0 - 0.2 K/UL  
 ABS. IMM. GRANS. 0.0 0.0 - 0.5 K/UL METABOLIC PANEL, COMPREHENSIVE Collection Time: 02/14/19  1:23 PM  
Result Value Ref Range Sodium 139 136 - 145 mmol/L Potassium 4.0 3.5 - 5.1 mmol/L Chloride 104 98 - 107 mmol/L  
 CO2 27 21 - 32 mmol/L Anion gap 8 mmol/L Glucose 115 (H) 65 - 100 mg/dL BUN 19 8 - 23 MG/DL Creatinine 1.36 0.8 - 1.5 MG/DL  
 GFR est AA >60 >60 ml/min/1.73m2 GFR est non-AA 51 ml/min/1.73m2 Calcium 9.1 8.3 - 10.4 MG/DL Bilirubin, total 0.8 0.2 - 1.1 MG/DL  
 ALT (SGPT) 20 12 - 65 U/L  
 AST (SGOT) 18 15 - 37 U/L Alk. phosphatase 106 50 - 136 U/L Protein, total 7.4 g/dL Albumin 3.8 3.2 - 4.6 g/dL Globulin 3.6 (H) 2.3 - 3.5 g/dL A-G Ratio 1.1 PROTHROMBIN TIME + INR Collection Time: 02/14/19  1:23 PM  
Result Value Ref Range Prothrombin time 15.0 (H) 11.7 - 14.5 sec INR 1.2 PTT Collection Time: 02/14/19  1:23 PM  
Result Value Ref Range aPTT 29.0 24.7 - 39.8 SEC  
LIPASE Collection Time: 02/14/19  1:23 PM  
Result Value Ref Range  Lipase 152 73 - 393 U/L  
POC LACTIC ACID  
 Collection Time: 02/14/19  1:38 PM  
Result Value Ref Range Lactic Acid (POC) 1.29 0.5 - 1.9 mmol/L METABOLIC PANEL, BASIC Collection Time: 02/15/19  6:26 AM  
Result Value Ref Range Sodium 143 136 - 145 mmol/L Potassium 4.0 3.5 - 5.1 mmol/L Chloride 105 98 - 107 mmol/L  
 CO2 29 21 - 32 mmol/L Anion gap 9 mmol/L Glucose 127 (H) 65 - 100 mg/dL BUN 22 8 - 23 MG/DL Creatinine 1.35 0.8 - 1.5 MG/DL  
 GFR est AA >60 >60 ml/min/1.73m2 GFR est non-AA 52 ml/min/1.73m2 Calcium 9.1 8.3 - 10.4 MG/DL  
CBC W/O DIFF Collection Time: 02/15/19  6:26 AM  
Result Value Ref Range WBC 7.0 4.3 - 11.1 K/uL  
 RBC 3.62 (L) 4.23 - 5.6 M/uL  
 HGB 12.5 (L) 13.6 - 17.2 g/dL HCT 36.6 (L) 41.1 - 50.3 % .1 (H) 79.6 - 97.8 FL  
 MCH 34.5 (H) 26.1 - 32.9 PG  
 MCHC 34.2 31.4 - 35.0 g/dL  
 RDW 13.2 11.9 - 14.6 % PLATELET 034 176 - 867 K/uL MPV 8.5 (L) 9.4 - 12.3 FL ABSOLUTE NRBC 0.00 0.0 - 0.2 K/uL All Micro Results None No results found for this visit on 02/14/19. Current Meds: 
Current Facility-Administered Medications Medication Dose Route Frequency  aspirin chewable tablet 81 mg  81 mg Oral DAILY  buPROPion SR [facility]) tablet 150 mg  150 mg Oral BID  cyanocobalamin tablet 500 mcg  500 mcg Oral DAILY  atorvastatin (LIPITOR) tablet 20 mg  20 mg Oral QHS  pantoprazole (PROTONIX) tablet 40 mg  40 mg Oral ACB  sodium chloride (NS) flush 5-40 mL  5-40 mL IntraVENous Q8H  
 sodium chloride (NS) flush 5-40 mL  5-40 mL IntraVENous PRN  
 acetaminophen (TYLENOL) tablet 650 mg  650 mg Oral Q4H PRN  
 ondansetron (ZOFRAN) injection 4 mg  4 mg IntraVENous Q4H PRN  
 morphine injection 1 mg  1 mg IntraVENous Q4H PRN  
 0.9% sodium chloride infusion  75 mL/hr IntraVENous CONTINUOUS Other Studies (last 24 hours): 
Ct Abd Pelv W Cont Result Date: 2/14/2019 CT ABDOMEN AND PELVIS WITH CONTRAST.  HISTORY: Severe left lower quadrant pain. COMPARISON: None. TECHNIQUE: 5 mm axial scans from above the diaphragms to the pubic symphysis following oral and 100 cc intravenous contrast without acute complication. Intravenous contrast was given to increase the sensitivity to acute inflammation. Radiation dose reduction techniques were used for this study. Our CT scanners use one or more of the following: Automated exposure control, adjustment of the mA and or kV according to patient size, iterative reconstruction. FINDINGS: Abdomen: The lung bases and straight minimal atelectasis. The liver and spleen demonstrates tiny calcifications in the spleen and probable subcentimeter cyst right lobe the liver. . No calcified gallstones. The biliary tree is not dilated. The pancreas is atrophic. No free fluid, acute inflammatory changes or adenopathy. Bowel loops are not dilated. Moderate stool throughout the tendon colon. The kidneys demonstrate appropriate renal cysts. No radiopaque renal calculi. No hydronephrosis. The adrenal glands are normal size. Aorta is normal caliber and calcified. Pelvis: No free fluid or acute inflammatory changes. The urinary bladder unremarkable. Prostate is mildly enlarged. IMPRESSION:  No acute abnormality. Moderate stool throughout the redundant colon. There is aortic atherosclerosis. Assessment and Plan:  
 
Hospital Problems as of 2/15/2019 Date Reviewed: 1/28/2019 Codes Class Noted - Resolved POA Abdominal pain ICD-10-CM: R10.9 ICD-9-CM: 789.00  2/14/2019 - Present Yes * (Principal) Constipation ICD-10-CM: K59.00 ICD-9-CM: 564.00  2/14/2019 - Present Yes Essential (primary) hypertension ICD-10-CM: I10 
ICD-9-CM: 401.9  Unknown - Present Yes Hyperlipidemia ICD-10-CM: E78.5 ICD-9-CM: 272.4  Unknown - Present Yes GERD (gastroesophageal reflux disease) ICD-10-CM: K21.9 ICD-9-CM: 530.81  Unknown - Present Yes Plan: # Constipation with abdominal pain - Surgical recs appreciated - Enema on admission with small BM. Schedule enemas today for further clean out. If pain persists despite bowel clean out may need repeat surgical eval. 
            - Gary cath 
  
# HTN 
            - home meds 
  
# GERD 
            - PPI 
  
# HLD 
            - statin DC planning/Dispo:  Scheduled enemas today. Diet:  DIET FULL LIQUID 
DVT ppx:  SCDs Signed: 
Kian Griffin MD

## 2019-02-16 PROBLEM — R10.9 ABDOMINAL PAIN: Status: RESOLVED | Noted: 2019-01-01 | Resolved: 2019-01-01

## 2019-02-16 PROBLEM — K59.00 CONSTIPATION: Status: RESOLVED | Noted: 2019-01-01 | Resolved: 2019-01-01

## 2019-02-16 NOTE — PROGRESS NOTES
Pt becoming more agitated and attempting to get OOB. Pt also pulling at PIV lines and has already pulled an IV out this morning. Pt currently wearing soft mittens, but takes them off as well. Dr. Tiffanie Acosta called and orders received for soft wrist restraints and was placed on pt at this time. Will continue to monitor.

## 2019-02-16 NOTE — PROGRESS NOTES
ANABELLE met with patient's family (son Estephania Morataya and FREDA) who expressed concerns about discharging to [facility]. Estephania Morataya states that his father is not as independent as he was at his baseline and he will need additional care. Estephania Morataya states that the patient has two private caregivers, but they are only there fifteen minutes in the am, and fifteen minutes in the pm. SW empathized with Jayme's concerns, explained that with [facility] Medicare the patient would need to be under inpatient status to be referred to [facility]. ANABELLE advised that if independent living is no longer meeting the patient's needs then the family would have to explore more appropriate options such as correction or LTC. ANABELLE explained that Medicaid will pay for LTC, provided a list of SNFs that provide LTC as well as contact information for Sury with [address] [place] [address]. ANABELLE also educated family on correction and provided a local directory. SW offered to refer patient for [facility] services, but Estephania Morataya declined. Taryn Gastelum, FEIW  [facility] [email or web address]

## 2019-02-16 NOTE — PROGRESS NOTES
Shift assessment complete. No acute distress noted at this time. IVF infusing without difficulty. Bed alarm on. Call light within reach.

## 2019-02-16 NOTE — DISCHARGE SUMMARY
Hospitalist Discharge Summary Admit Date:  2019 12:52 PM  
Name:  Mynor Martinez Age:  80 y.o. 
:  1920 MRN:  [id number] PCP:  Josue Pulido MD 
Treatment Team: Attending Provider: Deandre Amaya MD; Utilization Review: Arina Juarez, MARYLIN; Utilization Review: Princess Alicia RN 
 
Problem List for this Hospitalization: 
Hospital Problems as of 2019 Date Reviewed: 2019 Codes Class Noted - Resolved POA Essential (primary) hypertension ICD-10-CM: I10 
ICD-9-CM: 401.9  Unknown - Present Yes Hyperlipidemia ICD-10-CM: E78.5 ICD-9-CM: 272.4  Unknown - Present Yes GERD (gastroesophageal reflux disease) ICD-10-CM: K21.9 ICD-9-CM: 530.81  Unknown - Present Yes RESOLVED: Abdominal pain ICD-10-CM: R10.9 ICD-9-CM: 789.00  2019 - 2019 Yes * (Principal) RESOLVED: Constipation ICD-10-CM: K59.00 ICD-9-CM: 564.00  2019 - 2019 Yes Hospital Course: Mr. Camelia Levy is a 79 y/o male who presented to the ED on  with severe abdominal pain. CT abdomen and pelvis revealed severe constipation. There was no evidence for bowel obstruction, bowel infarction, aortic disease, renal stones. He was incredibly tender to palpation and appeared very uncomfortable initially so general surgery evaluated him in the ED and felt his pain was only due to severe constipation. He was given multiple enemas and began having frequent bowel movements. He was given 1/2 of a colonoscopy prep but only drank a small portion of it. His pain is resolved entirely. He ambulated to the chair with PT and is tolerating his diet. He is stable for discharge. Should take Miralax and Pericolace BID. Dulcolax suppositories and/or OTC enemas as needed are ok as well (but would avoid Fleets due to renal function). Mag ox may be helpful too. Hospital course otherwise unremarkable. Follow up instructions and discharge meds at bottom of this note.   Plan was discussed with patient, nursing, PT, CM, family. All questions answered. Patient was stable at time of discharge. Diagnostic Imaging/Tests:  
Ct Abd Pelv W Cont Result Date: 2/14/2019 CT ABDOMEN AND PELVIS WITH CONTRAST. HISTORY: Severe left lower quadrant pain. COMPARISON: None. TECHNIQUE: 5 mm axial scans from above the diaphragms to the pubic symphysis following oral and 100 cc intravenous contrast without acute complication. Intravenous contrast was given to increase the sensitivity to acute inflammation. Radiation dose reduction techniques were used for this study. Our CT scanners use one or more of the following: Automated exposure control, adjustment of the mA and or kV according to patient size, iterative reconstruction. FINDINGS: Abdomen: The lung bases and straight minimal atelectasis. The liver and spleen demonstrates tiny calcifications in the spleen and probable subcentimeter cyst right lobe the liver. . No calcified gallstones. The biliary tree is not dilated. The pancreas is atrophic. No free fluid, acute inflammatory changes or adenopathy. Bowel loops are not dilated. Moderate stool throughout the tendon colon. The kidneys demonstrate appropriate renal cysts. No radiopaque renal calculi. No hydronephrosis. The adrenal glands are normal size. Aorta is normal caliber and calcified. Pelvis: No free fluid or acute inflammatory changes. The urinary bladder unremarkable. Prostate is mildly enlarged. IMPRESSION:  No acute abnormality. Moderate stool throughout the redundant colon. There is aortic atherosclerosis. Echocardiogram results: No results found for this visit on 02/14/19. Procedures done this admission: * No surgery found * All Micro Results None Labs: Results:  
   
BMP, Mg, Phos Recent Labs  
  02/15/19 
0626 02/14/19 
1323  139  
K 4.0 4.0  
 104 CO2 29 27 AGAP 9 8 BUN 22 19 CREA 1.35 1.36  
CA 9.1 9.1 * 115* CBC Recent Labs  
  02/15/19 
0626 02/14/19 
1323 WBC 7.0 3.6*  
RBC 3.62* 3.84* HGB 12.5* 13.0*  
HCT 36.6* 39.1*  
 237 GRANS  --  72  
LYMPH  --  18  
EOS  --  1 MONOS  --  8 BASOS  --  1 IG  --  0 ANEU  --  2.6 ABL  --  0.7 SKYLER  --  0.0 ABM  --  0.3 ABB  --  0.0 AIG  --  0.0 LFT Recent Labs  
  02/14/19 
1323 SGOT 18 ALT 20   
TP 7.4 ALB 3.8 GLOB 3.6* AGRAT 1.1 Cardiac Testing Lab Results Component Value Date/Time Troponin-I, Qt. <0.02 (L) 12/31/2018 12:23 PM  
  
Coagulation Tests Lab Results Component Value Date/Time Prothrombin time 15.0 (H) 02/14/2019 01:23 PM  
 INR 1.2 02/14/2019 01:23 PM  
 INR 1.0 09/06/2017 04:14 PM  
 INR 1.0 09/06/2017 03:55 PM  
 aPTT 29.0 02/14/2019 01:23 PM  
  
A1c Lab Results Component Value Date/Time Hemoglobin A1c 5.1 02/25/2018 04:12 AM  
  
Lipid Panel Lab Results Component Value Date/Time Cholesterol, total 147 11/19/2018 12:02 PM  
 HDL Cholesterol 66 11/19/2018 12:02 PM  
 LDL, calculated 70 11/19/2018 12:02 PM  
 VLDL, calculated 11 11/19/2018 12:02 PM  
 Triglyceride 56 11/19/2018 12:02 PM  
 CHOL/HDL Ratio 2.3 02/25/2018 04:12 AM  
  
Thyroid Panel Lab Results Component Value Date/Time TSH 0.469 01/01/2019 02:56 AM  
 TSH 0.392 02/25/2018 04:12 AM  
    
Most Recent UA Lab Results Component Value Date/Time  Color YELLOW 12/31/2018 11:14 PM  
 Appearance CLEAR 12/31/2018 11:14 PM  
 Specific gravity 1.017 12/31/2018 11:14 PM  
 pH (UA) 6.0 12/31/2018 11:14 PM  
 Protein NEGATIVE  12/31/2018 11:14 PM  
 Glucose NEGATIVE  12/31/2018 11:14 PM  
 Ketone NEGATIVE  12/31/2018 11:14 PM  
 Bilirubin NEGATIVE  12/31/2018 11:14 PM  
 Blood NEGATIVE  12/31/2018 11:14 PM  
 Urobilinogen 1.0 12/31/2018 11:14 PM  
 Nitrites NEGATIVE  12/31/2018 11:14 PM  
 Leukocyte Esterase NEGATIVE  12/31/2018 11:14 PM  
 WBC 0-3 02/24/2018 03:44 PM  
 Epithelial cells 0-3 02/24/2018 03:44 PM  
 Bacteria 0 02/24/2018 03:44 PM  
 Casts 10-20 02/24/2018 03:44 PM  
  
 
No Known Allergies Immunization History Administered Date(s) Administered  Influenza High Dose Vaccine PF 09/06/2017, 10/30/2018  Influenza Vaccine 10/01/2016  Pneumococcal Conjugate (PCV-13) 10/01/2015  Pneumococcal Polysaccharide (PPSV-23) 10/01/2016  Tdap 09/06/2017 All Labs from Last 24 Hrs: 
No results found for this or any previous visit (from the past 24 hour(s)). Current Med List in Hospital:  
Current Facility-Administered Medications Medication Dose Route Frequency  aspirin chewable tablet 81 mg  81 mg Oral DAILY  buPROPion SR [facility]) tablet 150 mg  150 mg Oral BID  cyanocobalamin tablet 500 mcg  500 mcg Oral DAILY  atorvastatin (LIPITOR) tablet 20 mg  20 mg Oral QHS  pantoprazole (PROTONIX) tablet 40 mg  40 mg Oral ACB  sodium chloride (NS) flush 5-40 mL  5-40 mL IntraVENous PRN  
 acetaminophen (TYLENOL) tablet 650 mg  650 mg Oral Q4H PRN  
 ondansetron (ZOFRAN) injection 4 mg  4 mg IntraVENous Q4H PRN  
 morphine injection 1 mg  1 mg IntraVENous Q4H PRN Discharge Exam: 
Patient Vitals for the past 24 hrs: 
 Temp Pulse Resp BP SpO2  
02/16/19 1203 97.8 °F (36.6 °C) 68 18 154/65 95 % 02/16/19 0753 97.5 °F (36.4 °C) 66 18 133/66 96 % 02/16/19 0312 97.4 °F (36.3 °C) 69 18 136/60 90 % 02/15/19 2359 96.7 °F (35.9 °C) 67 17 135/68 95 % 02/15/19 1915 96.8 °F (36 °C) 75 18 164/87 96 % 02/15/19 1728 97.5 °F (36.4 °C) 80 18 164/79 96 % Oxygen Therapy O2 Sat (%): 95 % (02/16/19 1203) Pulse via Oximetry: 72 beats per minute (02/14/19 2007) O2 Device: Room air (02/16/19 0730) Intake/Output Summary (Last 24 hours) at 2/16/2019 1401 Last data filed at 2/16/2019 1100 Gross per 24 hour Intake 2230 ml Output 400 ml Net 1830 ml  
   
*Note that automatically entered I/Os may not be accurate; dependent on patient compliance with collection and accurate  by assistants. General:    Well nourished. Alert. Eyes:   Normal sclera. Extraocular movements intact. ENT:  Normocephalic, atraumatic. Moist mucous membranes CV:   Regular rate and rhythm. No murmur, rub, or gallop. Lungs:  Clear to auscultation bilaterally. No wheezing, rhonchi, or rales. Abdomen: Soft, nontender, nondistended. Bowel sounds normal.  
Extremities: Warm and dry. No cyanosis or edema. Neurologic: CN II-XII grossly intact. Sensation intact. Skin:     No rashes or jaundice. Psych:  Normal mood and affect. Discharge Info:  
Current Discharge Medication List  
  
START taking these medications Details  
senna-docusate (PERICOLACE) 8.6-50 mg per tablet Take 1 Tab by mouth daily. Qty: 60 Tab, Refills: 0  
  
bisacodyl (DULCOLAX) 10 mg suppository Insert 10 mg into rectum daily as needed. Qty: 30 Suppository, Refills: 0  
  
polyethylene glycol (MIRALAX) 17 gram packet Take 1 Packet by mouth two (2) times a day. Qty: 60 Packet, Refills: 0 CONTINUE these medications which have NOT CHANGED Details buPROPion XL (WELLBUTRIN XL) 150 mg tablet Take 1 Tab by mouth every morning. Qty: 30 Tab, Refills: 3 Associated Diagnoses: Dysthymia (or depressive neurosis) lovastatin (MEVACOR) 40 mg tablet Take 1 Tab by mouth nightly. Qty: 30 Tab, Refills: 3 Associated Diagnoses: Hyperlipidemia, unspecified hyperlipidemia type  
  
omeprazole (PRILOSEC) 20 mg capsule Take 1 Cap by mouth daily. Qty: 30 Cap, Refills: 3 Associated Diagnoses: Gastroesophageal reflux disease, esophagitis presence not specified  
  
cyanocobalamin (VITAMIN B12) 500 mcg tablet Take 1 Tab by mouth daily. Qty: 30 Tab, Refills: 1  
  
aspirin 81 mg chewable tablet Take 81 mg by mouth daily. Associated Diagnoses: Other pulmonary embolism without acute cor pulmonale, unspecified chronicity ([facility] [address] Miralax and Pericolace twice daily. Suppositories and enemas as needed for continued constipation despite other measures. F/u with PCP 1 week. Disposition: home Activity: Activity as tolerated Diet: DIET REGULAR Follow-up Appointments Procedures  FOLLOW UP VISIT Appointment in: Other (Specify) PCP -- 1 week PCP -- 1 week Standing Status:   Standing Number of Occurrences:   1 Order Specific Question:   Appointment in Answer: Other (Specify) Follow-up Information Follow up With Specialties Details Why Contact Info Eleazar Bautista MD Internal Medicine In 1 week Hospital follow up. Severe constipation. [address] [facility] [address] 
[phone] Time spent in patient discharge planning and coordination 35 minutes.  
 
Signed: 
Patricio Pulido MD

## 2019-02-16 NOTE — PROGRESS NOTES
I am accessing Mr. Stringer's chart as a part of our department's internal chart auditing process. I certify that Mr. Gabbie Manning is, or was, a patient in our department. Thank you, 
Vita Carpenter, PT 
2/16/2019

## 2019-02-16 NOTE — PROGRESS NOTES
Discharge instructions reviewed with patient and family members. Opportunity for questions given. Pt's son instructed to call on Monday for follow up appt in one week with PCP, understanding was voiced. Rx given for dulcolax, miralax, and pericolace.

## 2019-02-16 NOTE — PROGRESS NOTES
Problem: Mobility Impaired (Adult and Pediatric) Goal: *Acute Goals and Plan of Care (Insert Text) DISCHARGE GOALS: 
(1.)Mr. Roxie Pineda will move from supine to sit and sit to supine  in bed with SUPERVISION within 3 treatment day(s). (2.)Mr. Roxie Pineda will transfer from bed to chair and chair to bed with SUPERVISION using the least restrictive device within 3 treatment day(s). (3.)Mr. Roxie Pineda will ambulate with SUPERVISION for 100 feet with the least restrictive device within 3 treatment day(s). ________________________________________________________________________________________________ PHYSICAL THERAPY: Initial Assessment and PM 2019OBSERVATION:   
[place] / [place] / Product Type: Medicare /   
  
NAME/AGE/GENDER: Rachna May is a 80 y.o. male PRIMARY DIAGNOSIS: Abdominal pain [R10.9] Constipation [K59.00] Constipation Constipation ICD-10: Treatment Diagnosis:  
 · Generalized Muscle Weakness (M62.81) · Difficulty in walking, Not elsewhere classified (R26.2) Precaution/Allergies: 
Patient has no known allergies. ASSESSMENT:  
Mr. Roxie Pineda presents with above diagnosis. Patient demonstrates generalized weakness affecting mobility and independence. Patient with confusion as well limiting participation. Unsure of patient's baseline mobility. Conflicting information from chart review-independent without an assistive device and independent with a walker. No family present to clarify. Patient does live independently at [facility] and has an aide check on him 2 times per day. Patient with some difficulty with bed mobility but anticipated with type of bed and he has not been up recently. Initially unsteady with standing and ambulation but on 2nd attempt, much more steady and ambulated with SBA only. Did use RW for mobility. Patient will benefit from continued therapy to address strength and mobility. This section established at most recent assessment PROBLEM LIST (Impairments causing functional limitations): 1. Decreased Strength 2. Decreased ADL/Functional Activities 3. Decreased Transfer Abilities 4. Decreased Ambulation Ability/Technique 5. Decreased Balance 6. Decreased Cognition INTERVENTIONS PLANNED: (Benefits and precautions of physical therapy have been discussed with the patient.) 1. Balance Exercise 2. Bed Mobility 3. Family Education 4. Gait Training 5. Home Exercise Program (HEP) 6. Therapeutic Activites 7. Therapeutic Exercise/Strengthening TREATMENT PLAN: Frequency/Duration: daily for duration of hospital stay Rehabilitation Potential For Stated Goals: Good RECOMMENDED REHABILITATION/EQUIPMENT: (at time of discharge pending progress): Due to the probability of continued deficits (see above) this patient will likely need continued skilled physical therapy after discharge. Equipment:  
? None at this time HISTORY:  
History of Present Injury/Illness (Reason for Referral): Mr. Jayy Ratliff is a 81 y/o WM with a h/o HTN, GERD, HLD who presents today with severe abdominal pain that started earlier today. Son and DIL are present and provide the history. He called his son today and said he had abdominal pain and didn't feel up to going to breakfast. He is apparently a stoic individual and rarely complaints, so this was alarming to them. His pain then progressed and became so severe to the point where he had difficulty moving so he was brought to the ED. Patient says his last BM was a few days ago. No urinary troubles lately and no h/o BPH or other prostate issues. Lactic acid is normal at 1.29; sCr is 1.36 (baseline 1-1.3). He has diffuse abdominal tenderness.  CT a/p with contrast shows mildly enlared prostate, distended bladder, constipation and aortic atherosclerosis but no evidence of bowel obstruction/infraction, kidney stones or pyelonephritis. Surgery was consulted given his diffuse tenderness. Gary catheter was just placed and he has already put out 600cc. Past Medical History/Comorbidities:  
Mr. Renny Hollins  has a past medical history of Acute kidney injury ([facility] [address] (12/31/2018), CAP (community acquired pneumonia) (12/31/2018), Diverticulitis, Essential (primary) hypertension, GERD (gastroesophageal reflux disease), Hyperlipidemia, Memory loss, Osteoporosis, Pulmonary embolism ([facility] [address] Varicella, and Volvulus of colon ([facility] [address] Mr. Renny Hollins  has a past surgical history that includes hx lap cholecystectomy; hx hernia repair; and hx tonsil and adenoidectomy. Social History/Living Environment:  
[facility] One/Two Story Residence: One story Living Alone: Yes Support Systems: Family member(s)(aide 2x/day) Patient Expects to be Discharged to[de-identified] ([facility]ay Fatima) Current DME Used/Available at Home: (unknown) Prior Level of Function/Work/Activity: 
Unsure of baseline-ambulatory but unclear with or without an assistive device. Wearing brief during hospitalization but unsure of continence issues at baseline. Personal Factors:   
      Sex:  male Age:  80 y.o. Number of Personal Factors/Comorbidities that affect the Plan of Care: 1-2: MODERATE COMPLEXITY EXAMINATION:  
Most Recent Physical Functioning:  
Gross Assessment: 
AROM: Generally decreased, functional 
Strength: Generally decreased, functional 
Coordination: Generally decreased, functional 
Tone: Normal 
         
  
Posture: 
  
Balance: 
Sitting - Static: Good (unsupported) Sitting - Dynamic: Fair (occasional) Standing - Static: Fair Standing - Dynamic : Fair Bed Mobility: 
Supine to Sit: Minimum assistance Scooting: Minimum assistance Wheelchair Mobility: 
  
Transfers: 
Sit to Stand: Contact guard assistance Stand to Sit: Stand-by assistance Bed to Chair: Stand-by assistance;Contact guard assistance Gait: 
  
 Speed/Valeria: Slow Step Length: Left shortened;Right shortened Gait Abnormalities: Decreased step clearance Distance (ft): 20 Feet (ft)(x 1; 5' x 1) Assistive Device: Walker, rolling Ambulation - Level of Assistance: Stand-by assistance;Contact guard assistance Interventions: Verbal cues Body Structures Involved: 1. Muscles Body Functions Affected: 1. Movement Related Activities and Participation Affected: 1. Mobility 2. Self Care Number of elements that affect the Plan of Care: 4+: HIGH COMPLEXITY CLINICAL PRESENTATION:  
Presentation: Stable and uncomplicated: LOW COMPLEXITY CLINICAL DECISION [facility] [place] [address] AM-PAC 6 Clicks Basic Mobility Inpatient Short Form How much difficulty does the patient currently have. .. Unable A Lot A Little None 1. Turning over in bed (including adjusting bedclothes, sheets and blankets)? [] 1   [] 2   [x] 3   [] 4  
2. Sitting down on and standing up from a chair with arms ( e.g., wheelchair, bedside commode, etc.)   [] 1   [] 2   [x] 3   [] 4  
3. Moving from lying on back to sitting on the side of the bed? [] 1   [] 2   [x] 3   [] 4 How much help from another person does the patient currently need. .. Total A Lot A Little None 4. Moving to and from a bed to a chair (including a wheelchair)? [] 1   [] 2   [x] 3   [] 4  
5. Need to walk in hospital room? [] 1   [] 2   [x] 3   [] 4  
6. Climbing 3-5 steps with a railing? [] 1   [x] 2   [] 3   [] 4  
© , Trustees of [address] [place] [address], under license to [employer]. All rights reserved Score:  Initial: 17 Most Recent: X (Date: -- ) Interpretation of Tool:  Represents activities that are increasingly more difficult (i.e. Bed mobility, Transfers, Gait). Medical Necessity:    
· Patient is expected to demonstrate progress in strength, balance and coordination to increase independence with bed mobility, transfers, gait, and ADLs. Reason for Services/Other Comments: · Patient continues to require skilled intervention due to generalized weakness and confusion affecting mobility and independence. Use of outcome tool(s) and clinical judgement create a POC that gives a: Clear prediction of patient's progress: LOW COMPLEXITY  
  
 
 
 
TREATMENT:  
(In addition to Assessment/Re-Assessment sessions the following treatments were rendered) Pre-treatment Symptoms/Complaints:  Patient agreeable to working with therapist. 
Pain: Initial:  
Pain Intensity 1: 0  Post Session:  0 Therapeutic Activity: (    10 minutes): Therapeutic activities including sit <> stand transitions and sustained standing balance for brief change/hygiene to improve mobility, strength and balance. Required minimal Verbal cues to promote static and dynamic balance in standing. Braces/Orthotics/Lines/Etc:  
· O2 Device: Room air Treatment/Session Assessment:   
· Response to Treatment:  Patient participated well. Struggled some with bed mobility and initially unsteady with standing and walking but improved on 2nd attempt. · Interdisciplinary Collaboration:  
o Physical Therapist 
o Registered Nurse 
o Physician · After treatment position/precautions:  
o Up in chair 
o Bed alarm/tab alert on 
o Bed/Chair-wheels locked 
o Call light within reach 
o RN notified · Compliance with Program/Exercises: Will assess as treatment progresses · Recommendations/Intent for next treatment session: \"Next visit will focus on advancements to more challenging activities and reduction in assistance provided\". Total Treatment Duration: PT Patient Time In/Time Out Time In: 1240 Time Out: 1300 Dwight Rojas PT

## 2019-02-16 NOTE — DISCHARGE INSTRUCTIONS
Patient Education     DISCHARGE SUMMARY from Nurse    PATIENT INSTRUCTIONS:    After general anesthesia or intravenous sedation, for 24 hours or while taking prescription Narcotics:  · Limit your activities  · Do not drive and operate hazardous machinery  · Do not make important personal or business decisions  · Do  not drink alcoholic beverages  · If you have not urinated within 8 hours after discharge, please contact your surgeon on call. Report the following to your surgeon:  · Excessive pain, swelling, redness or odor of or around the surgical area  · Temperature over 100.5  · Nausea and vomiting lasting longer than 4 hours or if unable to take medications  · Any signs of decreased circulation or nerve impairment to extremity: change in color, persistent  numbness, tingling, coldness or increase pain  · Any questions    What to do at Home:  *  Please give a list of your current medications to your Primary Care Provider. *  Please update this list whenever your medications are discontinued, doses are      changed, or new medications (including over-the-counter products) are added. *  Please carry medication information at all times in case of emergency situations. These are general instructions for a healthy lifestyle:    No smoking/ No tobacco products/ Avoid exposure to second hand smoke  Surgeon General's Warning:  Quitting smoking now greatly reduces serious risk to your health. Obesity, smoking, and sedentary lifestyle greatly increases your risk for illness    A healthy diet, regular physical exercise & weight monitoring are important for maintaining a healthy lifestyle    You may be retaining fluid if you have a history of heart failure or if you experience any of the following symptoms:  Weight gain of 3 pounds or more overnight or 5 pounds in a week, increased swelling in our hands or feet or shortness of breath while lying flat in bed.   Please call your doctor as soon as you notice any of these symptoms; do not wait until your next office visit. Recognize signs and symptoms of STROKE:    F-face looks uneven    A-arms unable to move or move unevenly    S-speech slurred or non-existent    T-time-call 911 as soon as signs and symptoms begin-DO NOT go       Back to bed or wait to see if you get better-TIME IS BRAIN. Warning Signs of HEART ATTACK     Call 911 if you have these symptoms:   Chest discomfort. Most heart attacks involve discomfort in the center of the chest that lasts more than a few minutes, or that goes away and comes back. It can feel like uncomfortable pressure, squeezing, fullness, or pain.  Discomfort in other areas of the upper body. Symptoms can include pain or discomfort in one or both arms, the back, neck, jaw, or stomach.  Shortness of breath with or without chest discomfort.  Other signs may include breaking out in a cold sweat, nausea, or lightheadedness. Don't wait more than five minutes to call 911 - MINUTES MATTER! Fast action can save your life. Calling 911 is almost always the fastest way to get lifesaving treatment. Emergency Medical Services staff can begin treatment when they arrive -- up to an hour sooner than if someone gets to the hospital by car. The discharge information has been reviewed with the patient. The patient verbalized understanding. Discharge medications reviewed with the patient and appropriate educational materials and side effects teaching were provided. ___________________________________________________________________________________________________________________________________   Hedwig Pica and Pericolace twice daily. Suppositories and enemas as needed for continued constipation despite other measures. F/u with PCP 1 week. Constipation: Care Instructions  Your Care Instructions    Constipation means that you have a hard time passing stools (bowel movements). People pass stools from 3 times a day to once every 3 days.  What is normal for you may be different. Constipation may occur with pain in the rectum and cramping. The pain may get worse when you try to pass stools. Sometimes there are small amounts of bright red blood on toilet paper or the surface of stools. This is because of enlarged veins near the rectum (hemorrhoids). A few changes in your diet and lifestyle may help you avoid ongoing constipation. Your doctor may also prescribe medicine to help loosen your stool. Some medicines can cause constipation. These include pain medicines and antidepressants. Tell your doctor about all the medicines you take. Your doctor may want to make a medicine change to ease your symptoms. Follow-up care is a key part of your treatment and safety. Be sure to make and go to all appointments, and call your doctor if you are having problems. It's also a good idea to know your test results and keep a list of the medicines you take. How can you care for yourself at home? · Drink plenty of fluids, enough so that your urine is light yellow or clear like water. If you have kidney, heart, or liver disease and have to limit fluids, talk with your doctor before you increase the amount of fluids you drink. · Include high-fiber foods in your diet each day. These include fruits, vegetables, beans, and whole grains. · Get at least 30 minutes of exercise on most days of the week. Walking is a good choice. You also may want to do other activities, such as running, swimming, cycling, or playing tennis or team sports. · Take a fiber supplement, such as Citrucel or Metamucil, every day. Read and follow all instructions on the label. · Schedule time each day for a bowel movement. A daily routine may help. Take your time having your bowel movement. · Support your feet with a small step stool when you sit on the toilet. This helps flex your hips and places your pelvis in a squatting position.   · Your doctor may recommend an over-the-counter laxative to relieve your constipation. Examples are Milk of Magnesia and MiraLax. Read and follow all instructions on the label. Do not use laxatives on a long-term basis. When should you call for help? Call your doctor now or seek immediate medical care if:    · You have new or worse belly pain.     · You have new or worse nausea or vomiting.     · You have blood in your stools.    Watch closely for changes in your health, and be sure to contact your doctor if:    · Your constipation is getting worse.     · You do not get better as expected. Where can you learn more? Go to http://red-alena.info/. Enter 21  in the search box to learn more about \"Constipation: Care Instructions. \"  Current as of: September 23, 2018  Content Version: 11.9  © 8931-3228 [employer]. Care instructions adapted under license by [employer] (which disclaims liability or warranty for this information). If you have questions about a medical condition or this instruction, always ask your healthcare professional. [place] [address] any warranty or liability for your use of this information.

## 2019-03-08 PROBLEM — N17.9 AKI (ACUTE KIDNEY INJURY) (HCC): Status: ACTIVE | Noted: 2019-01-01

## 2019-03-08 PROBLEM — K56.600 PARTIAL SMALL BOWEL OBSTRUCTION (HCC): Status: ACTIVE | Noted: 2019-01-01

## 2019-03-08 PROBLEM — R11.2 NAUSEA AND VOMITING: Status: ACTIVE | Noted: 2019-01-01

## 2019-03-08 PROBLEM — G93.41 ACUTE METABOLIC ENCEPHALOPATHY: Status: ACTIVE | Noted: 2019-01-01

## 2019-03-08 NOTE — ED TRIAGE NOTES
Pt from [facility] via EMS. Staff called daughter that pt has been vomiting and unknown  If he fell or hit head. Unknown how long pt had been vomiting. Daughter flying in from out of town. Pt normally is not altered. No s/s of a fall with ems. Very uncooperative with ems. VS stable. BP sitting up 90/60. BP lying 112/60.

## 2019-03-08 NOTE — H&P
Hospitalist H&P Note     Admit Date:  3/8/2019 10:34 AM   Name:  Javi Romero   Age:  80 y.o.  :  1920   MRN:  [id number]   PCP:  Madelyn Smallwood MD  Treatment Team: Attending Provider: Brandon Acosta MD; Primary Nurse: Susy Khalil RN    HPI:     CC:  Confusion and nausea/ emesis    Mr. Jeremías Peñaloza is a 81 yo male evaluated with nausea, emesis and confusion. He reportedly lives at [address] and was noted to have large amounts of emesis in his garbage and to be more confused that his baseline. ED evaluation shows PANKAJ creatinine of 3.0. KUB nonspecific and CTAP pending though he is refusing to take oral contrast due to confusion. Also unable to pass darden due to stricture. CT head negative. Attempted to contact son and received voice mail      10 systems not possible due to confusion         Past Medical History:   Diagnosis Date    Acute kidney injury ([address] 2018    CAP (community acquired pneumonia) 2018    Diverticulitis     Essential (primary) hypertension     GERD (gastroesophageal reflux disease)     Hyperlipidemia     Memory loss     Osteoporosis     Pulmonary embolism ([address]     only one episode with simultaneous DVT.   Patient and family deny hypercoaguable work up   [facility] Var[facility]     [facility] of colon [address]       Past Surgical History:   Procedure Laterality Date    HX HERNIA REPAIR      HX LAP CHOLECYSTECTOMY      HX TONSIL AND ADENOIDECTOMY        No Known Allergies   Social History     Tobacco Use    Smoking status: Former Smoker     Last attempt to quit: 1980     Years since quittin.2    Smokeless tobacco: Never Used   Substance Use Topics    Alcohol use: No     Alcohol/week: 0.0 oz     Frequency: Never     Comment: daily      Family History   Problem Relation Age of Onset    Diabetes Mother       Immunization History   Administered Date(s) Administered    Influenza High Dose Vaccine PF 2017, 10/30/2018    Influenza Vaccine 10/01/2016    Pneumococcal Conjugate (PCV-13) 10/01/2015    Pneumococcal Polysaccharide (PPSV-23) 10/01/2016    Tdap 09/06/2017     PTA Medications:  Prior to Admission Medications   Prescriptions Last Dose Informant Patient Reported? Taking?   aspirin 81 mg chewable tablet   Yes No   Sig: Take 81 mg by mouth daily. bisacodyl (DULCOLAX) 10 mg suppository   No No   Sig: Insert 10 mg into rectum daily as needed. buPROPion XL (WELLBUTRIN XL) 150 mg tablet   No No   Sig: Take 1 Tab by mouth every morning. cyanocobalamin (VITAMIN B12) 500 mcg tablet   No No   Sig: Take 1 Tab by mouth daily. lovastatin (MEVACOR) 40 mg tablet   No No   Sig: Take 1 Tab by mouth nightly. omeprazole (PRILOSEC) 20 mg capsule   No No   Sig: Take 1 Cap by mouth daily. polyethylene glycol (MIRALAX) 17 gram packet   No No   Sig: Take 1 Packet by mouth two (2) times a day. senna-docusate (PERICOLACE) 8.6-50 mg per tablet   No No   Sig: Take 1 Tab by mouth daily. Facility-Administered Medications: None       Objective:     Patient Vitals for the past 24 hrs:   Temp Pulse Resp BP SpO2   03/08/19 1528    138/62    03/08/19 1459    126/59    03/08/19 1428    95/50    03/08/19 1403     94 %   03/08/19 1402  69  (!) 87/51 90 %   03/08/19 1037 98.2 °F (36.8 °C) 68 18 107/56 95 %     Oxygen Therapy  O2 Sat (%): 94 % (03/08/19 1403)  Pulse via Oximetry: 70 beats per minute (03/08/19 1403)  O2 Device: Room air (03/08/19 1037)  No intake or output data in the 24 hours ending 03/08/19 1542    Physical Exam:  General:    Alert. No distress  Eyes:   Normal sclera. Extraocular movements intact. PERRLA  ENT:  Normocephalic, atraumatic. dry mucous membranes  CV:   RRR. No m/r/g.  P No edema  Lungs:  CTAB. No wheezing, rhonchi, or rales. Anterior exam  Abdomen: Distended, absent BS  Extremities: Warm and dry  Neurologic:  grossly intact. Though confused  Skin:     No rashes or jaundice.   Normal coloration  Psych:  Confused     I reviewed the labs, imaging, EKGs, telemetry, and other studies done this admission. Data Review:   Recent Results (from the past 24 hour(s))   EKG, 12 LEAD, INITIAL    Collection Time: 03/08/19 10:40 AM   Result Value Ref Range    Ventricular Rate 69 BPM    Atrial Rate 69 BPM    P-R Interval 270 ms    QRS Duration 86 ms    Q-T Interval 376 ms    QTC Calculation (Bezet) 402 ms    Calculated P Axis 72 degrees    Calculated R Axis 20 degrees    Calculated T Axis 75 degrees    Diagnosis       !! AGE AND GENDER SPECIFIC ECG ANALYSIS !! Sinus rhythm with 1st degree A-V block  Otherwise normal ECG  When compared with ECG of 31-DEC-2018 11:51,  No significant change was found  Confirmed by [facility]  MD (), RANDY SCHMITT ([id number]) on 3/8/2019 1:57:14 PM     CBC WITH AUTOMATED DIFF    Collection Time: 03/08/19 10:45 AM   Result Value Ref Range    WBC 5.7 4.3 - 11.1 K/uL    RBC 4.27 4.23 - 5.6 M/uL    HGB 14.6 13.6 - 17.2 g/dL    HCT 43.2 41.1 - 50.3 %    .2 (H) 79.6 - 97.8 FL    MCH 34.2 (H) 26.1 - 32.9 PG    MCHC 33.8 31.4 - 35.0 g/dL    RDW 13.3 11.9 - 14.6 %    PLATELET 519 162 - 017 K/uL    MPV 9.0 (L) 9.4 - 12.3 FL    ABSOLUTE NRBC 0.00 0.0 - 0.2 K/uL    DF AUTOMATED      NEUTROPHILS 83 (H) 43 - 78 %    LYMPHOCYTES 11 (L) 13 - 44 %    MONOCYTES 6 4.0 - 12.0 %    EOSINOPHILS 0 (L) 0.5 - 7.8 %    BASOPHILS 0 0.0 - 2.0 %    IMMATURE GRANULOCYTES 0 0.0 - 5.0 %    ABS. NEUTROPHILS 4.8 1.7 - 8.2 K/UL    ABS. LYMPHOCYTES 0.6 0.5 - 4.6 K/UL    ABS. MONOCYTES 0.3 0.1 - 1.3 K/UL    ABS. EOSINOPHILS 0.0 0.0 - 0.8 K/UL    ABS. BASOPHILS 0.0 0.0 - 0.2 K/UL    ABS. IMM.  GRANS. 0.0 0.0 - 0.5 K/UL   METABOLIC PANEL, COMPREHENSIVE    Collection Time: 03/08/19 10:45 AM   Result Value Ref Range    Sodium 135 (L) 136 - 145 mmol/L    Potassium 4.9 3.5 - 5.1 mmol/L    Chloride 95 (L) 98 - 107 mmol/L    CO2 31 21 - 32 mmol/L    Anion gap 9 7 - 16 mmol/L    Glucose 144 (H) 65 - 100 mg/dL    BUN 34 (H) 8 - 23 MG/DL    Creatinine 3.00 (H) 0.8 - 1.5 MG/DL    GFR est AA 25 (L) >60 ml/min/1.73m2    GFR est non-AA 21 (L) >60 ml/min/1.73m2    Calcium 10.2 8.3 - 10.4 MG/DL    Bilirubin, total 1.0 0.2 - 1.1 MG/DL    ALT (SGPT) 20 12 - 65 U/L    AST (SGOT) 17 15 - 37 U/L    Alk. phosphatase 116 50 - 136 U/L    Protein, total 8.5 (H) 6.3 - 8.2 g/dL    Albumin 4.3 3.2 - 4.6 g/dL    Globulin 4.2 (H) 2.3 - 3.5 g/dL    A-G Ratio 1.0 (L) 1.2 - 3.5     LIPASE    Collection Time: 03/08/19 10:45 AM   Result Value Ref Range    Lipase 141 73 - 393 U/L   POC LACTIC ACID    Collection Time: 03/08/19  2:15 PM   Result Value Ref Range    Lactic Acid (POC) 1.93 (H) 0.5 - 1.9 mmol/L       All Micro Results     None          Other Studies:  Xr Abd (kub)    Result Date: 3/8/2019  KUB INDICATION: Vomiting this morning for several hours. Severe abdominal pain FINDINGS: 2 supine images were submitted. Comparison is made to CT scan 02/14/2019. The lung bases are clear. No suspicious masses or calcifications. Mild lumbar levoscoliosis with thoracolumbar degenerative changes. There is a nonspecific bowel gas pattern. No suspicious masses or calcifications. IMPRESSION: Nonspecific bowel gas pattern. Ct Head Wo Cont    Result Date: 3/8/2019  CT Head without contrast   Indication: Possible fall. Altered mental status Comparison:  12/31/2018  Procedure: 5 mm axial images were obtained from skull base to vertex without contrast. Radiation dose reduction techniques were used for this study:  Our CT scanners use one or all of the following: Automated exposure control, adjustment of the mA and/or kVp according to patient's size, iterative reconstruction. Findings: The ventricular size and configuration is normal given age-appropriate diffuse cortical atrophy. White matter hypodensities compatible with microvascular ischemia. Bilateral basal ganglia calcifications. Negative for intraparenchymal hemorrhage, extra-axial fluid collection, or acute cortical based infarct.  Small vessel and hyperacute infarcts may not be evident on initial CT imaging. Diffuse cortical and cerebellar involutional change. The basilar cisterns are patent. Mastoid air cells and paranasal sinuses are clear. The calvarium is intact. IMPRESSION: No acute intracranial abnormality. 2. Microvascular ischemia.         Assessment and Plan:     Hospital Problems as of 3/8/2019 Date Reviewed: 2/21/2019          Codes Class Noted - Resolved POA    Nausea and vomiting ICD-10-CM: R11.2  ICD-9-CM: 787.01  3/8/2019 - Present Yes        * (Principal) PANKAJ (acute kidney injury) ([facility] ICD-10-CM: N17.9  ICD-9-CM: 584.9  3/8/2019 - Present Yes        Acute metabolic encephalopathy FDN-29-FC: G93.41  ICD-9-CM: 348.31  3/8/2019 - Present Yes              · PANKAJ: hydrate and repeat BMP, check UA, needs darden and urology consult due to stricture   · Acute encephalopathy: due to GI issues and PANKAJ, will followup UA and infectious workup / CXR  · Nausea and emesis: pending CTAP, NPO, repeat lactate after hydration     Discharge planning:  PPD, pending   DVT ppx: SCD  Code status:  Full- unable to clarify  Estimated LOS:  Greater than 2 midnights  Risk:  high  Care plan: no family available- received voicemail of son   Signed:  Anne Marie Garcia MD

## 2019-03-08 NOTE — PROGRESS NOTES
03/08/19 1724   Dual Skin Pressure Injury Assessment   Dual Skin Pressure Injury Assessment X  (scattered abrasions; scab on left ear)   Second Care Provider (Based on [address] [place] [address]) Danny Simmonds, RN     Abrasions to left eyelid, right posterior forearm, right posterior shoulder    Scabs on left ear    Scattered bruising on upper extremities    Soft heels bilaterally

## 2019-03-08 NOTE — ED NOTES
Report received from [facility], [address] [facility] and care assumed at this time.

## 2019-03-08 NOTE — PROGRESS NOTES
Patient refused to complete xray abdominal series. We were able to obtain a KUB image. Per ordering ER physician ok to change order to KUB.

## 2019-03-08 NOTE — ED NOTES
TRANSFER - OUT REPORT:    Verbal report given to Lucina Mcneil RN on Svitlana Scott  being transferred to 6th floor for routine progression of care       Report consisted of patients Situation, Background, Assessment and   Recommendations(SBAR). Information from the following report(s) ED Summary was reviewed with the receiving nurse. Lines:   Peripheral IV 03/08/19 Right Antecubital (Active)   Site Assessment Clean, dry, & intact 3/8/2019 10:47 AM   Phlebitis Assessment 0 3/8/2019 10:47 AM   Infiltration Assessment 0 3/8/2019 10:47 AM   Dressing Status Clean, dry, & intact 3/8/2019 10:47 AM   Dressing Type Transparent 3/8/2019 10:47 AM   Hub Color/Line Status Pink 3/8/2019 10:47 AM        Opportunity for questions and clarification was provided.       Patient transported with:   Patient-specific medications from Pharmacy

## 2019-03-08 NOTE — PROGRESS NOTES
TRANSFER - IN REPORT:    Verbal report received from April (name) on [facility]  being received from ED(unit) for routine progression of care      Report consisted of patients Situation, Background, Assessment and   Recommendations(SBAR). Information from the following report(s) SBAR was reviewed with the receiving nurse. Opportunity for questions and clarification was provided. Assessment completed upon patients arrival to unit and care assumed.

## 2019-03-08 NOTE — ED PROVIDER NOTES
Elderly patient who lives independently, had person at the facility went to check on him and found him awake, confused and aggressive. This is not typical of this patient, who lives autonomously. In his garbage can. They found evidence of vomiting a significant amount. On arrival, the patient states he has no complaints. He denies that he had vomited. His abdomen is somewhat frequently does not give me an answer as to whether this is larger than baseline. The history is provided by the patient and the EMS personnel. Altered mental status    This is a new problem. Episode onset: first noticed when I am worker went to check on patient's garbage. The problem has been gradually improving. Associated symptoms include confusion. Pertinent negatives include no seizures and no unresponsiveness. Risk factors: elderly. His past medical history does not include CVA. Past Medical History:   Diagnosis Date    Acute kidney injury ([address] 12/31/2018    CAP (community acquired pneumonia) 12/31/2018    Diverticulitis     Essential (primary) hypertension     GERD (gastroesophageal reflux disease)     Hyperlipidemia     Memory loss     Osteoporosis     Pulmonary embolism ([address]     only one episode with simultaneous DVT.   Patient and family deny hypercoaguable work up   [facility] Varicella     Volvulus of colon ([address]        Past Surgical History:   Procedure Laterality Date    HX HERNIA REPAIR      HX LAP CHOLECYSTECTOMY      HX TONSIL AND ADENOIDECTOMY           Family History:   Problem Relation Age of Onset    Diabetes Mother        Social History     Socioeconomic History    Marital status:      Spouse name: Not on file    Number of children: Not on file    Years of education: Not on file    Highest education level: Not on file   Social Needs    Financial resource strain: Not on file    Food insecurity - worry: Not on file    Food insecurity - inability: Not on file   [place] needs - medical: Not on file    Transportation needs - non-medical: Not on file   Occupational History    Occupation: Industrial Management   Tobacco Use    Smoking status: Former Smoker     Last attempt to quit: 1980     Years since quittin.2    Smokeless tobacco: Never Used   Substance and Sexual Activity    Alcohol use: No     Alcohol/week: 0.0 oz     Frequency: Never     Comment: daily    Drug use: Not on file    Sexual activity: Not on file   Other Topics Concern    Not on file   Social History Narrative    Not on file         ALLERGIES: Patient has no known allergies. Review of Systems   Constitutional: Negative for chills and fever. HENT: Negative. Respiratory: Negative for shortness of breath. Cardiovascular: Negative for chest pain. Gastrointestinal: Positive for abdominal distention, abdominal pain and vomiting. Negative for diarrhea. Neurological: Negative for seizures. Psychiatric/Behavioral: Positive for confusion. All other systems reviewed and are negative. Vitals:    19 1037   BP: 107/56   Pulse: 68   Resp: 18   Temp: 98.2 °F (36.8 °C)   SpO2: 95%   Weight: 64.9 kg (143 lb)   Height: 5' 2\" (1.575 m)            Physical Exam   Constitutional: He appears well-developed and well-nourished. He appears lethargic. Non-toxic appearance. He appears ill. Frail/elderly/confused   HENT:   Mouth/Throat: Oropharynx is clear and moist.   Eyes: No scleral icterus. Neck: Normal range of motion. Cardiovascular: Normal rate. No murmur heard. Pulmonary/Chest: Effort normal. No respiratory distress. Abdominal: He exhibits distension. There is no guarding. Musculoskeletal: Normal range of motion. Neurological: He appears lethargic. Coordination normal.   Skin: Skin is warm and dry. Psychiatric: His mood appears not anxious. Does not like to be bothered or manipulate   Nursing note and vitals reviewed.        MDM  Number of Diagnoses or Management Options  Diagnosis management comments: Vomiting. At advanced age. High probability of significant underlying pathology including small bowel obstruction, bowel infarction, Volvulus. Would be unlikely viral or food borne. No lower GI issues such as diarrhea. Amount and/or Complexity of Data Reviewed  Clinical lab tests: ordered and reviewed  Decide to obtain previous medical records or to obtain history from someone other than the patient: yes  Obtain history from someone other than the patient: yes  Discuss the patient with other providers: yes  Independent visualization of images, tracings, or specimens: yes    Risk of Complications, Morbidity, and/or Mortality  Presenting problems: high  Diagnostic procedures: low  Management options: moderate    Critical Care  Total time providing critical care: 30-74 minutes (===================================================================  This patient is critically ill and there is a high probability of of imminent or life threatening deterioration in the patient's condition without immediate management. The nature of the patient's clinical problem is: SBO    I have spent 45 minutes in direct patient care, documentation, review of labs/xrays/old records, discussion with Nursing, hospitalist .     The time involved in the performance of separately reportable procedures was not counted toward critical care time.      Daisy Camacho MD; 3/8/2019 @8:01 PM  ===================================================================    )         Procedures

## 2019-03-08 NOTE — PROGRESS NOTES
Hourly rounds performed. Oriented x1. Unable to verbalize , place, or time. All needs meet. Bed low/locked. Call light within reach. Patient denies needs at this time.   Will continue to monitor and report to oncoming RN

## 2019-03-08 NOTE — PROGRESS NOTES
CM chart review. PCP listed as Dr. Gabriele Patricio ([facility] - last visit noted on 2-21-19).

## 2019-03-08 NOTE — ED NOTES
Pt still attempting to get out of bed. Unable to start IV fluids due to pt sliding down in bed and attempting to get up. Dr. Mala Turcios aware. Awaiting orders.

## 2019-03-08 NOTE — CONSULTS
H&P/Consult Note/Progress Note/Office Note:   Dana Santos  MRN: [id number]  UPW:54/52/0277  Age:89 y.o. General Surgery Consult ordered by: Dr. Marley Jewell  Reason for General Surgery Consult: Partial SBO    HPI: Dana Santos is a 80 y.o. male who presented to the ED for evaluation on 3/8/19. Pt currently reside in longterm and was noted to be more confused than baseline and a large amount of emesis was found in his garbage can. ED evaluation shows PANKJA creatinine of 3.0. KUB nonspecific and CT of Abd and Pelvis showing New high-grade partial small bowel distal obstruction. 3/8/19: Pt resting in bed. Not oriented to person or place. Denies abd pain, nausea, and vomiting. Unable to recall last BM. Reports flatus earlier today. Past Medical History:   Diagnosis Date    Acute kidney injury ([address] 12/31/2018    CAP (community acquired pneumonia) 12/31/2018    Diverticulitis     Essential (primary) hypertension     GERD (gastroesophageal reflux disease)     Hyperlipidemia     Memory loss     Osteoporosis     Pulmonary embolism ([address]     only one episode with simultaneous DVT.   Patient and family deny hypercoaguable work up   [facility]     [facility]ulus of colon ([facility]      Past Surgical History:   Procedure Laterality Date    HX HERNIA REPAIR      HX LAP CHOLECYSTECTOMY      HX TONSIL AND ADENOIDECTOMY       Current Facility-Administered Medications   Medication Dose Route Frequency    [START ON 3/9/2019] buPROPion XL (WELLBUTRIN XL) tablet 150 mg (Patient Supplied)  150 mg Oral 7am    [START ON 3/9/2019] pantoprazole (PROTONIX) 40 mg in sodium chloride 0.9% 10 mL injection  40 mg IntraVENous DAILY    sodium chloride (NS) flush 5-40 mL  5-40 mL IntraVENous Q8H    sodium chloride (NS) flush 5-40 mL  5-40 mL IntraVENous PRN    acetaminophen (TYLENOL) tablet 650 mg  650 mg Oral Q6H PRN    HYDROcodone-acetaminophen (NORCO) 5-325 mg per tablet 1 Tab  1 Tab Oral Q4H PRN    morphine injection 1 mg  1 mg IntraVENous Q4H PRN    naloxone (NARCAN) injection 0.4 mg  0.4 mg IntraVENous PRN    ondansetron (ZOFRAN) injection 4 mg  4 mg IntraVENous Q4H PRN    tuberculin injection 5 Units  5 Units IntraDERMal ONCE    0.9% sodium chloride infusion  100 mL/hr IntraVENous CONTINUOUS     Patient has no known allergies. Social History     Socioeconomic History    Marital status:      Spouse name: Not on file    Number of children: Not on file    Years of education: Not on file    Highest education level: Not on file   Occupational History    Occupation: Industrial Management   Tobacco Use    Smoking status: Former Smoker     Last attempt to quit: 1980     Years since quittin.2    Smokeless tobacco: Never Used   Substance and Sexual Activity    Alcohol use: No     Alcohol/week: 0.0 oz     Frequency: Never     Comment: daily     Social History     Tobacco Use   Smoking Status Former Smoker    Last attempt to quit: 1980    Years since quittin.2   Smokeless Tobacco Never Used     Family History   Problem Relation Age of Onset    Diabetes Mother      ROS: Comprehensive review of systems was otherwise unremarkable except as noted above. Physical Exam:   Visit Vitals  /48 (BP 1 Location: Right arm, BP Patient Position: At rest)   Pulse 73   Temp 97.9 °F (36.6 °C)   Resp 27   Ht 5' 2\" (1.575 m)   Wt 143 lb (64.9 kg)   SpO2 98%   BMI 26.16 kg/m²     Vitals:    19 1428 19 1459 19 1528 19 1722   BP: 95/50 126/59 138/62 148/48   Pulse:    73   Resp:    27   Temp:    97.9 °F (36.6 °C)   SpO2:    98%   Weight:       Height:         No intake/output data recorded. No intake/output data recorded. Constitutional: Alert, no acute distress   Eyes:Sclera are clear. EOMs intact  ENMT: no external lesions gross hearing normal; no obvious neck masses, no ear or lip lesions, nares normal  CV: RRR. Normal perfusion  Resp: No JVD. Breathing is  non-labored; no audible wheezing. GI: soft, distended, non-tender, BS absent     Musculoskeletal: No embolic signs or cyanosis. Neuro:  Confused  Psychiatric: Confused, calm    Recent vitals (if inpt):  Patient Vitals for the past 24 hrs:   BP Temp Pulse Resp SpO2 Height Weight   03/08/19 1722 148/48 97.9 °F (36.6 °C) 73 27 98 %     03/08/19 1528 138/62         03/08/19 1459 126/59         03/08/19 1428 95/50         03/08/19 1403     94 %     03/08/19 1402 (!) 87/51  69  90 %     03/08/19 1037 107/56 98.2 °F (36.8 °C) 68 18 95 % 5' 2\" (1.575 m) 143 lb (64.9 kg)       Labs:  Recent Labs     03/08/19  1045   WBC 5.7   HGB 14.6      *   K 4.9   CL 95*   CO2 31   BUN 34*   CREA 3.00*   *   TBILI 1.0   SGOT 17   ALT 20      LPSE 141       Lab Results   Component Value Date/Time    WBC 5.7 03/08/2019 10:45 AM    HGB 14.6 03/08/2019 10:45 AM    PLATELET 944 73/75/8685 10:45 AM    Sodium 135 (L) 03/08/2019 10:45 AM    Potassium 4.9 03/08/2019 10:45 AM    Chloride 95 (L) 03/08/2019 10:45 AM    CO2 31 03/08/2019 10:45 AM    BUN 34 (H) 03/08/2019 10:45 AM    Creatinine 3.00 (H) 03/08/2019 10:45 AM    Glucose 144 (H) 03/08/2019 10:45 AM    INR 1.2 02/14/2019 01:23 PM    aPTT 29.0 02/14/2019 01:23 PM    Bilirubin, total 1.0 03/08/2019 10:45 AM    Bilirubin, direct 0.15 11/19/2018 12:02 PM    AST (SGOT) 17 03/08/2019 10:45 AM    ALT (SGPT) 20 03/08/2019 10:45 AM    Alk. phosphatase 116 03/08/2019 10:45 AM    Lipase 141 03/08/2019 10:45 AM    Troponin-I, Qt. <0.02 (L) 12/31/2018 12:23 PM       CT Results  (Last 48 hours)               03/08/19 1652  CT ABD PELV WO CONT Final result    Impression:  IMPRESSION: New high-grade partial small bowel distal obstruction. 2. Colonic diverticulosis without diverticulitis. Narrative:  CT of the Abdomen and Pelvis without contrast        INDICATION:  Confusion.  Distention       COMPARISON: 02/14/2019       TECHNIQUE: Multiple axial images were obtained through the abdomen and pelvis   without IV contrast. Oral contrast was administered. . Radiation dose reduction   techniques were used for this study:  Our CT scanners use one or all of the   following: Automated exposure control, adjustment of the mA and/or kVp according   to patient's size, iterative reconstruction. FINDINGS:   Abdomen CT:  Coronary artery calcifications calcified left basal granuloma with   bilateral basal lung scarring. Multiple calcified splenic granulomas. The   unenhanced liver is unremarkable. No acute cholecystitis findings. Bilateral   adrenal gland thickening is stable. Negative for hydronephrosis or renal stone. Bilateral renal cysts are unchanged. Atherosclerotic normal caliber abdominal   aorta. Interval development of a high-grade distal partial small bowel   obstruction with air and fluid distended abdominal and pelvic bowel loops. CT PELVIS: There is a right lower quadrant small bowel obstruction transition   point axial image 52 with a few normal caliber distal ileal loop. Colonic   diverticulosis without diverticulitis. No pelvic free fluid or inflammation. The   bones show no acute or aggressive abnormality taking into account degenerative   findings. 03/08/19 1109  CT HEAD WO CONT Final result    Impression:  IMPRESSION: No acute intracranial abnormality. 2. Microvascular ischemia. Narrative:  CT Head without contrast        Indication: Possible fall. Altered mental status       Comparison:  12/31/2018       Procedure: 5 mm axial images were obtained from skull base to vertex without   contrast. Radiation dose reduction techniques were used for this study:  Our CT   scanners use one or all of the following: Automated exposure control, adjustment   of the mA and/or kVp according to patient's size, iterative reconstruction.        Findings: The ventricular size and configuration is normal given age-appropriate   diffuse cortical atrophy. White matter hypodensities compatible with   microvascular ischemia. Bilateral basal ganglia calcifications. Negative for   intraparenchymal hemorrhage, extra-axial fluid collection, or acute cortical   based infarct. Small vessel and hyperacute infarcts may not be evident on   initial CT imaging. Diffuse cortical and cerebellar involutional change. The   basilar cisterns are patent. Mastoid air cells and paranasal sinuses are clear. The calvarium is intact. chest X-ray      I reviewed recent labs, recent radiologic studies, and pertinent records including other doctor notes if needed. I independently reviewed radiology images for studies I described above or studies I have ordered.    Admission date (for inpatients): 3/8/2019   * No surgery found *  * No surgery found *    ASSESSMENT/PLAN:  Problem List  Date Reviewed: 2/21/2019          Codes Class Noted    Nausea and vomiting ICD-10-CM: R11.2  ICD-9-CM: 787.01  3/8/2019        PANKAJ (acute kidney injury) ([facility] ICD-10-CM: N17.9  ICD-9-CM: 584.9  3/8/2019        Acute metabolic encephalopathy [facility]-WT: G93.41  ICD-9-CM: 348.31  3/8/2019        * (Principal) Partial small bowel obstruction ([facility] ICD-10-CM: K56.600  ICD-9-CM: 560.9  3/8/2019        Essential (primary) hypertension ICD-10-CM: I10  ICD-9-CM: 401.9  Unknown        Hyperlipidemia ICD-10-CM: E78.5  ICD-9-CM: 272.4  Unknown        Osteoporosis ICD-10-CM: M81.0  ICD-9-CM: 733.00  Unknown        Memory change ICD-10-CM: R41.3  ICD-9-CM: 780.93  Unknown        GERD (gastroesophageal reflux disease) ICD-10-CM: K21.9  ICD-9-CM: 530.81  Unknown            Principal Problem:    Partial small bowel obstruction ([facility] (3/8/2019)    Active Problems:    Nausea and vomiting (3/8/2019)      PANKAJ (acute kidney injury) ([facility] (3/8/2019)      Acute metabolic encephalopathy (9/4/2862)       Plan:  Care management per Hospitalist  Urology Consulted - unable to pass darden  IVF  NPO  NG to [facility]  General Surgery will follow. Conservative management. No plans for surgical intervention at this time. A clear transitional point at right lower quadrant, he had history of open appendectomy. It sounds he had \"constipation\" recently. I think it's probably adhesions. Try to manage conservatively.        Lise Grissom, JOSEMANUEL

## 2019-03-09 NOTE — PROGRESS NOTES
Hospitalist contacted r/t to being unable to place NG tube with attempts x2. First attempt NG tube coiled in pt mouth and pt unwilling to swallow or assist. On second attempt pt refusing to allow placement at all and becoming agitated. Hospitalist requested to notify General Surgery. Gen surgery aware and no new orders at this time. Continue to monitor symptoms. No n/v at this time.

## 2019-03-09 NOTE — PROGRESS NOTES
Patient is restless and confused this afternoon. He is refusing to participate with PT even with nursing encouragement. PT to be held and tried tomorrow morning as it is reported he experiences sundowner's confusion at times. Cale Rand, PT, DPT, OCS.

## 2019-03-09 NOTE — PROGRESS NOTES
Hourly rounding completed on this shift. No new complaints at this time. All needs met. Pt slept well through the night. No complaints of nausea or vomiting. Pt is currently resting in bed. Will continue to monitor and give report to oncoming nurse.

## 2019-03-09 NOTE — PROGRESS NOTES
Hospitalist contacted r/t pt still not voiding. Bladder scan 346 mL. New order to decrease fluids to 25 mL/hr. Hospitalist told this nurse not to attempt darden, as already unsuccessful, until urology assesses pt.

## 2019-03-09 NOTE — CONSULTS
CONSULT    Subjective:      Rachna May is a 80 y.o. male evaluated with nausea, emesis and confusion in ED yesterday and admitted by Hospitalist service. He reportedly lives at [address] and was noted to have large amounts of emesis in his garbage and to be more confused that his baseline. ED evaluation revealed PANKAJ creatinine of 3.0. KUB nonspecific. CT head negative. Despite appearing completely comfortable, he has had no urinary output since admission. One unsuccessful attempt at darden placement was made while in ER. Past Medical History:   Diagnosis Date    Acute kidney injury ([facility] 2018    CAP (community acquired pneumonia) 2018    Diverticulitis     Essential (primary) hypertension     GERD (gastroesophageal reflux disease)     Hyperlipidemia     Memory loss     Osteoporosis     Pulmonary embolism ([facility]     only one episode with simultaneous DVT. Patient and family deny hypercoaguable work up   [facility]     Volvulus of colon ([facility]      Past Surgical History:   Procedure Laterality Date    HX HERNIA REPAIR      HX LAP CHOLECYSTECTOMY      HX TONSIL AND ADENOIDECTOMY        Family History   Problem Relation Age of Onset    Diabetes Mother      Social History     Tobacco Use    Smoking status: Former Smoker     Last attempt to quit: 1980     Years since quittin.2    Smokeless tobacco: Never Used   Substance Use Topics    Alcohol use: No     Alcohol/week: 0.0 oz     Frequency: Never     Comment: daily     No Known Allergies  Prior to Admission medications    Medication Sig Start Date End Date Taking? Authorizing Provider   senna-docusate (PERICOLACE) 8.6-50 mg per tablet Take 1 Tab by mouth daily. 19   Sheila Peña MD   bisacodyl (DULCOLAX) 10 mg suppository Insert 10 mg into rectum daily as needed. 19   Sheila Peña MD   polyethylene glycol [facility]) 17 gram packet Take 1 Packet by mouth two (2) times a day.  19   Love Simms MD LOLY   buPROPion XL (WELLBUTRIN XL) 150 mg tablet Take 1 Tab by mouth every morning. 19   Maris Baeza MD   lovastatin (MEVACOR) 40 mg tablet Take 1 Tab by mouth nightly. 19   Maris Baeza MD   omeprazole (PRILOSEC) 20 mg capsule Take 1 Cap by mouth daily. 19   Maris Baeza MD   cyanocobalamin (VITAMIN B12) 500 mcg tablet Take 1 Tab by mouth daily. 1/3/19   Lauren Canales MD   aspirin 81 mg chewable tablet Take 81 mg by mouth daily. Provider, Historical        Review of Systems:  Pertinent items are noted in HPI. Objective:        Patient Vitals for the past 8 hrs:   BP Temp Pulse Resp SpO2   19 0741 158/80 97.7 °F (36.5 °C) 72 17 99 %   19 0401 147/57 97.5 °F (36.4 °C) 62 18 96 %       Temp (24hrs), Av.9 °F (36.6 °C), Min:97.5 °F (36.4 °C), Max:98.4 °F (36.9 °C)      Physical Exam:  GENERAL: cooperative, no distress, appears stated age, LUNG: clear to auscultation bilaterally, HEART: regular rate and rhythm, ABDOMEN: soft, non-tender. Bowel sounds normal. No masses,  no organomegaly      Assessment:     PANKAJ and no urinary output since admission    Plan:     Using sterile technique, 16 fr coude tip catheter was placed transurethrally without issue. He only had 400 ml of dark yellow urine in bladder. This makes me think this was more of a dehydration issue that true urinary retention. Gary can be left in place as creatinine trends downwards and removed by primary service when clinically appropriate. Call if questions.     Signed By: Valentine Daniels MD                         2019

## 2019-03-09 NOTE — PROGRESS NOTES
Progress Note/Office Note:   Crow Em  MRN: [id number]  QCB:[id number]  Age:89 y.o. General Surgery Consult ordered by: Dr. Perlita Su  Reason for General Surgery Consult: Partial SBO    HPI: Crow Em is a 80 y.o. male who presented to the ED for evaluation on 3/8/19. Pt currently reside in [place] and was noted to be more confused than baseline and a large amount of emesis was found in his garbage can. ED evaluation shows PANKAJ creatinine of 3.0. KUB nonspecific and CT of Abd and Pelvis showing New high-grade partial small bowel distal obstruction. 3/8/19: Pt resting in bed. Not oriented to person or place. Denies abd pain, nausea, and vomiting. Unable to recall last BM. Reports flatus earlier today. 3/9/19: Pt up to bathroom. Alert and cooperative. Large BM x 2. Denies abd pain, nausea, and vomiting. Gary patent. AF, NAD. Past Medical History:   Diagnosis Date    Acute kidney injury ([address] 12/31/2018    CAP (community acquired pneumonia) 12/31/2018    Diverticulitis     Essential (primary) hypertension     GERD (gastroesophageal reflux disease)     Hyperlipidemia     Memory loss     Osteoporosis     Pulmonary embolism ([address]     only one episode with simultaneous DVT.   Patient and family deny hypercoaguable work up   [facility] Varicella     Volvulus of colon ([facility]      Past Surgical History:   Procedure Laterality Date    HX HERNIA REPAIR      HX LAP CHOLECYSTECTOMY      HX TONSIL AND ADENOIDECTOMY       Current Facility-Administered Medications   Medication Dose Route Frequency    0.9% sodium chloride infusion  100 mL/hr IntraVENous CONTINUOUS    buPROPion XL (WELLBUTRIN XL) tablet 150 mg (Patient Supplied)  150 mg Oral 7am    pantoprazole (PROTONIX) 40 mg in sodium chloride 0.9% 10 mL injection  40 mg IntraVENous DAILY    sodium chloride (NS) flush 5-40 mL  5-40 mL IntraVENous Q8H    sodium chloride (NS) flush 5-40 mL  5-40 mL IntraVENous PRN    acetaminophen (TYLENOL) tablet 650 mg 650 mg Oral Q6H PRN    HYDROcodone-acetaminophen (NORCO) 5-325 mg per tablet 1 Tab  1 Tab Oral Q4H PRN    morphine injection 1 mg  1 mg IntraVENous Q4H PRN    naloxone (NARCAN) injection 0.4 mg  0.4 mg IntraVENous PRN    ondansetron (ZOFRAN) injection 4 mg  4 mg IntraVENous Q4H PRN    tuberculin injection 5 Units  5 Units IntraDERMal ONCE    0.9% sodium chloride infusion  150 mL/hr IntraVENous CONTINUOUS     Patient has no known allergies. Social History     Socioeconomic History    Marital status:      Spouse name: Not on file    Number of children: Not on file    Years of education: Not on file    Highest education level: Not on file   Occupational History    Occupation: Industrial Management   Tobacco Use    Smoking status: Former Smoker     Last attempt to quit: 1980     Years since quittin.2    Smokeless tobacco: Never Used   Substance and Sexual Activity    Alcohol use: No     Alcohol/week: 0.0 oz     Frequency: Never     Comment: daily     Social History     Tobacco Use   Smoking Status Former Smoker    Last attempt to quit: 1980    Years since quittin.2   Smokeless Tobacco Never Used     Family History   Problem Relation Age of Onset    Diabetes Mother      ROS: Comprehensive review of systems was otherwise unremarkable except as noted above. Physical Exam:   Visit Vitals  /54   Pulse 71   Temp 97.2 °F (36.2 °C)   Resp 17   Ht 5' 2\" (1.575 m)   Wt 143 lb (64.9 kg)   SpO2 96%   BMI 26.16 kg/m²     Vitals:    19 2311 19 0401 19 0741 19 1149   BP: 145/59 147/57 158/80 131/54   Pulse: 72 62 72 71   Resp: 18 18 17 17   Temp: 98.4 °F (36.9 °C) 97.5 °F (36.4 °C) 97.7 °F (36.5 °C) 97.2 °F (36.2 °C)   SpO2: 95% 96% 99% 96%   Weight:       Height:         No intake/output data recorded.  1901 -  0700  In: 473 [I.V.:473]  Out: -     Constitutional: Alert, no acute distress   Eyes:Sclera are clear.  EOMs intact  ENMT: no external lesions gross hearing normal; no obvious neck masses, no ear or lip lesions, nares normal  CV: RRR. Normal perfusion  Resp: No JVD. Breathing is  non-labored; no audible wheezing. GI: soft, distended, non-tender, BS absent     : darden  Musculoskeletal: No embolic signs or cyanosis. Neuro:  Alert  Psychiatric: Calm    Recent vitals (if inpt):  Patient Vitals for the past 24 hrs:   BP Temp Pulse Resp SpO2   03/09/19 1149 131/54 97.2 °F (36.2 °C) 71 17 96 %   03/09/19 0741 158/80 97.7 °F (36.5 °C) 72 17 99 %   03/09/19 0401 147/57 97.5 °F (36.4 °C) 62 18 96 %   03/08/19 2311 145/59 98.4 °F (36.9 °C) 72 18 95 %   03/08/19 1940 120/63 97.6 °F (36.4 °C) 72 20 95 %   03/08/19 1722 148/48 97.9 °F (36.6 °C) 73 27 98 %   03/08/19 1528 138/62       03/08/19 1459 126/59       03/08/19 1428 95/50       03/08/19 1403     94 %   03/08/19 1402 (!) 87/51  69  90 %       Labs:  Recent Labs     03/09/19  1139 03/09/19  0445 03/08/19  1045   WBC  --  4.4 5.7   HGB  --  11.2* 14.6   PLT  --  205 275    140 135*   K 4.8 4.5 4.9    103 95*   CO2 30 28 31   BUN 43* 44* 34*   CREA 2.87* 3.41* 3.00*   * 90 144*   TBILI  --   --  1.0   SGOT  --   --  17   ALT  --   --  20   AP  --   --  116   LPSE  --   --  141       Lab Results   Component Value Date/Time    WBC 4.4 03/09/2019 04:45 AM    HGB 11.2 (L) 03/09/2019 04:45 AM    PLATELET 350 54/67/6964 04:45 AM    Sodium 142 03/09/2019 11:39 AM    Potassium 4.8 03/09/2019 11:39 AM    Chloride 106 03/09/2019 11:39 AM    CO2 30 03/09/2019 11:39 AM    BUN 43 (H) 03/09/2019 11:39 AM    Creatinine 2.87 (H) 03/09/2019 11:39 AM    Glucose 105 (H) 03/09/2019 11:39 AM    INR 1.2 02/14/2019 01:23 PM    aPTT 29.0 02/14/2019 01:23 PM    Bilirubin, total 1.0 03/08/2019 10:45 AM    Bilirubin, direct 0.15 11/19/2018 12:02 PM    AST (SGOT) 17 03/08/2019 10:45 AM    ALT (SGPT) 20 03/08/2019 10:45 AM    Alk.  phosphatase 116 03/08/2019 10:45 AM    Lipase 141 03/08/2019 10:45 AM    Troponin-I, Qt. <0.02 (L) 12/31/2018 12:23 PM       CT Results  (Last 48 hours)               03/08/19 1652  CT ABD PELV WO CONT Final result    Impression:  IMPRESSION: New high-grade partial small bowel distal obstruction. 2. Colonic diverticulosis without diverticulitis. Narrative:  CT of the Abdomen and Pelvis without contrast        INDICATION:  Confusion. Distention       COMPARISON: 02/14/2019       TECHNIQUE: Multiple axial images were obtained through the abdomen and pelvis   without IV contrast. Oral contrast was administered. . Radiation dose reduction   techniques were used for this study:  Our CT scanners use one or all of the   following: Automated exposure control, adjustment of the mA and/or kVp according   to patient's size, iterative reconstruction. FINDINGS:   Abdomen CT:  Coronary artery calcifications calcified left basal granuloma with   bilateral basal lung scarring. Multiple calcified splenic granulomas. The   unenhanced liver is unremarkable. No acute cholecystitis findings. Bilateral   adrenal gland thickening is stable. Negative for hydronephrosis or renal stone. Bilateral renal cysts are unchanged. Atherosclerotic normal caliber abdominal   aorta. Interval development of a high-grade distal partial small bowel   obstruction with air and fluid distended abdominal and pelvic bowel loops. CT PELVIS: There is a right lower quadrant small bowel obstruction transition   point axial image 52 with a few normal caliber distal ileal loop. Colonic   diverticulosis without diverticulitis. No pelvic free fluid or inflammation. The   bones show no acute or aggressive abnormality taking into account degenerative   findings. 03/08/19 1109  CT HEAD WO CONT Final result    Impression:  IMPRESSION: No acute intracranial abnormality. 2. Microvascular ischemia. Narrative:  CT Head without contrast        Indication: Possible fall.  Altered mental status       Comparison:  12/31/2018       Procedure: 5 mm axial images were obtained from skull base to vertex without   contrast. Radiation dose reduction techniques were used for this study:  Our CT   scanners use one or all of the following: Automated exposure control, adjustment   of the mA and/or kVp according to patient's size, iterative reconstruction. Findings: The ventricular size and configuration is normal given age-appropriate   diffuse cortical atrophy. White matter hypodensities compatible with   microvascular ischemia. Bilateral basal ganglia calcifications. Negative for   intraparenchymal hemorrhage, extra-axial fluid collection, or acute cortical   based infarct. Small vessel and hyperacute infarcts may not be evident on   initial CT imaging. Diffuse cortical and cerebellar involutional change. The   basilar cisterns are patent. Mastoid air cells and paranasal sinuses are clear. The calvarium is intact. chest X-ray      I reviewed recent labs, recent radiologic studies, and pertinent records including other doctor notes if needed. I independently reviewed radiology images for studies I described above or studies I have ordered.    Admission date (for inpatients): 3/8/2019   * No surgery found *  * No surgery found *    ASSESSMENT/PLAN:  Problem List  Date Reviewed: 2/21/2019          Codes Class Noted    Nausea and vomiting ICD-10-CM: R11.2  ICD-9-CM: 787.01  3/8/2019        PANKAJ (acute kidney injury) ([facility] ICD-10-CM: N17.9  ICD-9-CM: 584.9  3/8/2019        Acute metabolic encephalopathy SOX-42-AM: G93.41  ICD-9-CM: 348.31  3/8/2019        * (Principal) Partial small bowel obstruction ([facility] ICD-10-CM: K56.600  ICD-9-CM: 560.9  3/8/2019        Essential (primary) hypertension ICD-10-CM: I10  ICD-9-CM: 401.9  Unknown        Hyperlipidemia ICD-10-CM: E78.5  ICD-9-CM: 272.4  Unknown        Osteoporosis ICD-10-CM: M81.0  ICD-9-CM: 733.00  Unknown        Memory change ICD-10-CM: R41.3  ICD-9-CM: 780.93  Unknown        GERD (gastroesophageal reflux disease) ICD-10-CM: K21.9  ICD-9-CM: 530.81  Unknown            Principal Problem:    Partial small bowel obstruction ([facility] (3/8/2019)    Active Problems:    Nausea and vomiting (3/8/2019)      PANKAJ (acute kidney injury) ([facility] (3/8/2019)      Acute metabolic encephalopathy (8/8/2639)       Plan:  Care management per Hospitalist  Urology Consulted - darden placed/ PANKAJ  IVF  Multiple large BM's. Will start clear liquids. Continue conservative management. No plans for surgical intervention at this time.        Jurgen Porter NP

## 2019-03-09 NOTE — PROGRESS NOTES
Hospitalist Progress Note     Admit Date:  3/8/2019 10:34 AM   Name:  Anita Watson   Age:  80 y.o.  :  1920   MRN:  [id number]   PCP:  Elier Whipple MD  Treatment Team: Attending Provider: Isaiah Campos MD; Consulting Provider: Haylee Cunningham MD; Charge Nurse: Hiren Brito RN; Physical Therapist: Arlene Beckham, PT; Occupational Therapist: Sharyle Mutters, OT; Utilization Review: Jeanne Patino RN    Subjective:   From H and P hpi:    CC:  Confusion and nausea/ emesis     Mr. Ivette Brownlee is a 79 yo male evaluated with nausea, emesis and confusion. He reportedly lives at [address] and was noted to have large amounts of emesis in his garbage and to be more confused that his baseline. ED evaluation shows PANKAJ creatinine of 3.0. KUB nonspecific and CTAP pending though he is refusing to take oral contrast due to confusion. Also unable to pass darden due to stricture. CT head negative. Attempted to contact son and received voice mail      3/9/19:    Ct abd. With likely psbo with cutoff rlq, but now several loose bm's. PANKAJ with oliguria but not accurate mesaurement, now has darden. Alert and cooperative at time my exam. Creatinine increased at 3.4 with admit creat. 3 and remote baseline around 1.3. He is 80 y.o.. And prognosis is guarded.        10 systems not possible due to confusion             Objective:     Patient Vitals for the past 24 hrs:   Temp Pulse Resp BP SpO2   19 1149 97.2 °F (36.2 °C) 71 17 131/54 96 %   19 0741 97.7 °F (36.5 °C) 72 17 158/80 99 %   19 0401 97.5 °F (36.4 °C) 62 18 147/57 96 %   19 2311 98.4 °F (36.9 °C) 72 18 145/59 95 %   19 1940 97.6 °F (36.4 °C) 72 20 120/63 95 %   19 1722 97.9 °F (36.6 °C) 73 27 148/48 98 %   19 1528    138/62    19 1459    126/59    19 1428    95/50    19 1403     94 %   19 1402  69  (!) 87/51 90 %     Oxygen Therapy  O2 Sat (%): 96 % (19 1149)  Pulse via Oximetry: 70 beats per minute (03/08/19 1403)  O2 Device: Room air (03/08/19 1037)    Intake/Output Summary (Last 24 hours) at 3/9/2019 1208  Last data filed at 3/9/2019 0530  Gross per 24 hour   Intake 473 ml   Output    Net 473 ml         Physical Examination:  General:          alert cooperative  Eyes:              conjunctiva clear,no roving eye movements  ENT:                mucous memb. dry  CV:                  no gallop , regular rate rhythm  Lungs:             clear no rhonchi or wheeze  Abdomen:        less distended ? Than prior notes suggest  Extremities:     no acral cyanosis, no deformity , moves all extrem. Neurologic:       grossly intact. Though confused  Skin:                No rashes or jaundice. Normal coloration  Psych:             less confused per nursing     I reviewed the labs, imaging, EKGs, telemetry, and other studies done this admission. Data Review:  I have reviewed all labs, meds, telemetry events, and studies from the last 24 hours.     Recent Results (from the past 24 hour(s))   POC LACTIC ACID    Collection Time: 03/08/19  2:15 PM   Result Value Ref Range    Lactic Acid (POC) 1.93 (H) 0.5 - 1.9 mmol/L   LACTIC ACID    Collection Time: 03/08/19  5:52 PM   Result Value Ref Range    Lactic acid 1.9 0.4 - 2.0 MMOL/L   LACTIC ACID    Collection Time: 03/08/19 11:37 PM   Result Value Ref Range    Lactic acid 1.0 0.4 - 2.0 MMOL/L   METABOLIC PANEL, BASIC    Collection Time: 03/09/19  4:45 AM   Result Value Ref Range    Sodium 140 136 - 145 mmol/L    Potassium 4.5 3.5 - 5.1 mmol/L    Chloride 103 98 - 107 mmol/L    CO2 28 21 - 32 mmol/L    Anion gap 9 7 - 16 mmol/L    Glucose 90 65 - 100 mg/dL    BUN 44 (H) 8 - 23 MG/DL    Creatinine 3.41 (H) 0.8 - 1.5 MG/DL    GFR est AA 22 (L) >60 ml/min/1.73m2    GFR est non-AA 18 (L) >60 ml/min/1.73m2    Calcium 8.4 8.3 - 10.4 MG/DL   CBC WITH AUTOMATED DIFF    Collection Time: 03/09/19  4:45 AM   Result Value Ref Range    WBC 4.4 4.3 - 11.1 K/uL    RBC 3.28 (L) 4.23 - 5.6 M/uL    HGB 11.2 (L) 13.6 - 17.2 g/dL    HCT 34.6 (L) 41.1 - 50.3 %    .5 (H) 79.6 - 97.8 FL    MCH 34.1 (H) 26.1 - 32.9 PG    MCHC 32.4 31.4 - 35.0 g/dL    RDW 13.7 11.9 - 14.6 %    PLATELET 816 183 - 785 K/uL    MPV 9.3 (L) 9.4 - 12.3 FL    ABSOLUTE NRBC 0.00 0.0 - 0.2 K/uL    DF AUTOMATED      NEUTROPHILS 71 43 - 78 %    LYMPHOCYTES 20 13 - 44 %    MONOCYTES 8 4.0 - 12.0 %    EOSINOPHILS 1 0.5 - 7.8 %    BASOPHILS 0 0.0 - 2.0 %    IMMATURE GRANULOCYTES 0 0.0 - 5.0 %    ABS. NEUTROPHILS 3.1 1.7 - 8.2 K/UL    ABS. LYMPHOCYTES 0.9 0.5 - 4.6 K/UL    ABS. MONOCYTES 0.3 0.1 - 1.3 K/UL    ABS. EOSINOPHILS 0.1 0.0 - 0.8 K/UL    ABS. BASOPHILS 0.0 0.0 - 0.2 K/UL    ABS. IMM.  GRANS. 0.0 0.0 - 0.5 K/UL   LACTIC ACID    Collection Time: 03/09/19  4:45 AM   Result Value Ref Range    Lactic acid 1.0 0.4 - 2.0 MMOL/L   LACTIC ACID    Collection Time: 03/09/19 11:39 AM   Result Value Ref Range    Lactic acid 1.0 0.4 - 2.0 MMOL/L        All Micro Results     None          Current Meds:  Current Facility-Administered Medications   Medication Dose Route Frequency    0.9% sodium chloride infusion  100 mL/hr IntraVENous CONTINUOUS    buPROPion XL (WELLBUTRIN XL) tablet 150 mg (Patient Supplied)  150 mg Oral 7am    pantoprazole (PROTONIX) 40 mg in sodium chloride 0.9% 10 mL injection  40 mg IntraVENous DAILY    sodium chloride (NS) flush 5-40 mL  5-40 mL IntraVENous Q8H    sodium chloride (NS) flush 5-40 mL  5-40 mL IntraVENous PRN    acetaminophen (TYLENOL) tablet 650 mg  650 mg Oral Q6H PRN    HYDROcodone-acetaminophen (NORCO) 5-325 mg per tablet 1 Tab  1 Tab Oral Q4H PRN    morphine injection 1 mg  1 mg IntraVENous Q4H PRN    naloxone (NARCAN) injection 0.4 mg  0.4 mg IntraVENous PRN    ondansetron (ZOFRAN) injection 4 mg  4 mg IntraVENous Q4H PRN    tuberculin injection 5 Units  5 Units IntraDERMal ONCE    0.9% sodium chloride infusion  150 mL/hr IntraVENous CONTINUOUS Diet:  DIET CLEAR LIQUID    Other Studies (last 24 hours):  Xr Chest Sngl V    Result Date: 3/8/2019  History: Lactic acidosis Exam: portable chest Comparison: 12/31/2018 Findings: No focal alveolar infiltrate or pleural effusion. No change in the appearance of the mediastinal contour or osseous structures. Impressions: Stable portable chest. No acute abnormality. Xr Abd (kub)    Result Date: 3/8/2019  KUB INDICATION: Vomiting this morning for several hours. Severe abdominal pain FINDINGS: 2 supine images were submitted. Comparison is made to CT scan 02/14/2019. The lung bases are clear. No suspicious masses or calcifications. Mild lumbar levoscoliosis with thoracolumbar degenerative changes. There is a nonspecific bowel gas pattern. No suspicious masses or calcifications. IMPRESSION: Nonspecific bowel gas pattern. Xr Abd Acute W 1 V Chest    Result Date: 3/9/2019  Abdominal Series INDICATION: Follow-up bowel obstruction A single view of the chest and 2 views of the abdomen were obtained. FINDINGS: The lungs are clear. There are no infiltrates or effusions. There is stable cardiomegaly. Flat and upright views of the abdomen show stable bowel distention. There is no free air. No renal calculi are seen. IMPRESSION:  Persistent bowel distention     Ct Abd Pelv Wo Cont    Result Date: 3/8/2019  CT of the Abdomen and Pelvis without contrast INDICATION:  Confusion. Distention COMPARISON: 02/14/2019 TECHNIQUE: Multiple axial images were obtained through the abdomen and pelvis without IV contrast. Oral contrast was administered. . Radiation dose reduction techniques were used for this study:  Our CT scanners use one or all of the following: Automated exposure control, adjustment of the mA and/or kVp according to patient's size, iterative reconstruction. FINDINGS: Abdomen CT:  Coronary artery calcifications calcified left basal granuloma with bilateral basal lung scarring.  Multiple calcified splenic granulomas. The unenhanced liver is unremarkable. No acute cholecystitis findings. Bilateral adrenal gland thickening is stable. Negative for hydronephrosis or renal stone. Bilateral renal cysts are unchanged. Atherosclerotic normal caliber abdominal aorta. Interval development of a high-grade distal partial small bowel obstruction with air and fluid distended abdominal and pelvic bowel loops. CT PELVIS: There is a right lower quadrant small bowel obstruction transition point axial image 52 with a few normal caliber distal ileal loop. Colonic diverticulosis without diverticulitis. No pelvic free fluid or inflammation. The bones show no acute or aggressive abnormality taking into account degenerative findings. IMPRESSION: New high-grade partial small bowel distal obstruction. 2. Colonic diverticulosis without diverticulitis. Assessment and Plan:     Hospital Problems as of 3/9/2019 Date Reviewed: 2/21/2019          Codes Class Noted - Resolved POA    Nausea and vomiting ICD-10-CM: R11.2  ICD-9-CM: 787.01  3/8/2019 - Present Yes        PANKAJ (acute kidney injury) ([facility] ICD-10-CM: N17.9  ICD-9-CM: 584.9  3/8/2019 - Present Yes        Acute metabolic encephalopathy TSA-39-MW: G93.41  ICD-9-CM: 348.31  3/8/2019 - Present Yes        * (Principal) Partial small bowel obstruction ([facility] ICD-10-CM: K56.600  ICD-9-CM: 560.9  3/8/2019 - Present Yes              A/P:    · PANKAJ:   ·       Increase ivf. And check urine spot na , I suspect volume depletion  · Acute encephalopathy:   · Acute illness, cultures pending. Metabolic encephalopathy re: azotemia  · Nausea and emesis: improved , pos. Loose bm, no current ngt.     ·             Consider trial clear liquid diet, await surgery opinion      Discharge planning:  PPD, pending clinical course  DVT ppx: SCD  Code status:  dnr

## 2019-03-10 NOTE — PROGRESS NOTES
Problem: Mobility Impaired (Adult and Pediatric)  Goal: *Acute Goals and Plan of Care (Insert Text)  1. Mr. Jeremías Peñaloza will perform supine to sit and sit to supine with supervision in 7 days. 2.  Mr. Jeremías Peñaloza will perform sit to stand and bed to chair with rolling walker in 7 days. 3.  Mr. Jeremías Peñaloza will perform gait with rolling walker 150 ft with supervision in 7 days. PHYSICAL THERAPY: Initial Assessment 3/10/2019  INPATIENT:    [place] / Plan[place] B / Product Type: Medicare /       NAME/AGE/GENDER: Javi Romero is a 80 y.o. male   PRIMARY DIAGNOSIS: Nausea and vomiting [R11.2] Partial small bowel obstruction (HCC) Partial small bowel obstruction (HCC)       ICD-10: Treatment Diagnosis:    · Generalized Muscle Weakness (M62.81)  · Difficulty in walking, Not elsewhere classified (R26.2)   Precaution/Allergies:  Patient has no known allergies. ASSESSMENT:     Mr. Jeremías Peñaloza presents in bed with bed alarm and lap belt in place. His daughter in law is at the bedside. Mr. Jeremías Peñaloza was confused but pleasant with me. He was able to follow most of my directions. Supine to sit with min assist.  He has a posterior lean with everything sitting and standing. He appears to be weight shifted to his heels in standing. Sit to stand with contact guard. Gait with walker to the chair. However he doesn't use the walker safely at all. Tosses it aside to go sit down. Daughter in law states that he will do the same at his home. He has had several falls. Its a wonder he has not broken his hip. He was been living by himself. services coming in throughout the day but by himself non the less. Daughter in law suggests they have been looking into assisted living however Mr. Stringer needs more than that. She asked about rehab and suggested that case management could give her some options. Mr. Jeremías Peñaloza is at a very high risk for falling.   Mr. Jeremías Peñaloza is appropriate for skilled PT to maximize his rehab potential.     This section established at most recent assessment   PROBLEM LIST (Impairments causing functional limitations):  1. Decreased Strength  2. Decreased Transfer Abilities  3. Decreased Ambulation Ability/Technique  4. Decreased Activity Tolerance   INTERVENTIONS PLANNED: (Benefits and precautions of physical therapy have been discussed with the patient.)  1. Bed Mobility  2. Gait Training  3. Therapeutic Activites  4. Transfer Training     TREATMENT PLAN: Frequency/Duration: 4 times a week for duration of hospital stay  Rehabilitation Potential For Stated Goals: Good     RECOMMENDED REHABILITATION/EQUIPMENT: (at time of discharge pending progress): Due to the probability of continued deficits (see above) this patient will likely need continued skilled physical therapy after discharge. Equipment:    to be determined. HISTORY:   History of Present Injury/Illness (Reason for Referral):  Mr. Franny Levy is a 79 yo male evaluated with nausea, emesis and confusion. He reportedly lives at [facility] and was noted to have large amounts of emesis in his garbage and to be more confused that his baseline. ED evaluation shows PANKAJ creatinine of 3.0. KUB nonspecific and CTAP pending though he is refusing to take oral contrast due to confusion. Also unable to pass darden due to stricture. CT head negative. Attempted to contact son and received voice mail      Past Medical History/Comorbidities:   Mr. Franny Levy  has a past medical history of Acute kidney injury ([address] (12/31/2018), CAP (community acquired pneumonia) (12/31/2018), Diverticulitis, Essential (primary) hypertension, GERD (gastroesophageal reflux disease), Hyperlipidemia, Memory loss, Osteoporosis, Pulmonary embolism ([address] Varicella, and Volvulus of colon ([address] Mr. Franny Levy  has a past surgical history that includes hx lap cholecystectomy; hx hernia repair; and hx tonsil and adenoidectomy.   Social History/Living Environment:   Home Environment: Independent living  One/Two Story Residence: One story  Living Alone: Yes  Support Systems: Family member(s)  Patient Expects to be Discharged to[de-identified] Unknown  Current DME Used/Available at Home: Walker, rolling  Prior Level of Function/Work/Activity:  Was living in independent living. age   Number of Personal Factors/Comorbidities that affect the Plan of Care: 1-2: MODERATE COMPLEXITY   EXAMINATION:   Most Recent Physical Functioning:   Gross Assessment:  AROM: Within functional limits  PROM: Within functional limits  Strength: Generally decreased, functional  Coordination: Generally decreased, functional  Tone: Normal               Posture:  Posture (WDL): Exceptions to WDL  Posture Assessment: Rounded shoulders, Forward head  Balance:  Standing: Impaired; With support  Standing - Static: Fair  Standing - Dynamic : Fair Bed Mobility:  Rolling: Independent  Supine to Sit: Contact guard assistance  Wheelchair Mobility:     Transfers:  Sit to Stand: Minimum assistance(posterior weight shift. )  Stand to Sit: Contact guard assistance  Gait:     Base of Support: Widened  Speed/Valeria: Shuffled  Step Length: Right shortened;Left shortened  Gait Abnormalities: Decreased step clearance; Ataxic  Assistive Device: Walker, rolling  Ambulation - Level of Assistance: Minimal assistance;Assist x1      Body Structures Involved:  1. Muscles Body Functions Affected:  1. Movement Related Activities and Participation Affected:  1. Mobility   Number of elements that affect the Plan of Care: 3: MODERATE COMPLEXITY   CLINICAL PRESENTATION:   Presentation: Evolving clinical presentation with changing clinical characteristics: MODERATE COMPLEXITY   CLINICAL DECISION MAKIN [facility] Mobility Inpatient Short Form  How much difficulty does the patient currently have. .. Unable A Lot A Little None   1. Turning over in bed (including adjusting bedclothes, sheets and blankets)?    [] 1   [] 2   [x] 3   [] 4 2.  Sitting down on and standing up from a chair with arms ( e.g., wheelchair, bedside commode, etc.)   [] 1   [x] 2   [] 3   [] 4   3. Moving from lying on back to sitting on the side of the bed? [] 1   [] 2   [x] 3   [] 4   How much help from another person does the patient currently need. .. Total A Lot A Little None   4. Moving to and from a bed to a chair (including a wheelchair)? [] 1   [x] 2   [] 3   [] 4   5. Need to walk in hospital room? [] 1   [x] 2   [] 3   [] 4   6. Climbing 3-5 steps with a railing? [x] 1   [] 2   [] 3   [] 4   © 2007, Trustees of [facility] [employer], under license to [employer]. All rights reserved      Score:  Initial: 13 Most Recent: X (Date: -- )    Interpretation of Tool:  Represents activities that are increasingly more difficult (i.e. Bed mobility, Transfers, Gait). Medical Necessity:     · Patient is expected to demonstrate progress in functional technique to decrease assistance required with mobility and gait. .  Reason for Services/Other Comments:  · Patient continues to require present interventions due to patient's inability to function at baseline. .   Use of outcome tool(s) and clinical judgement create a POC that gives a: Questionable prediction of patient's progress: MODERATE COMPLEXITY            TREATMENT:   (In addition to Assessment/Re-Assessment sessions the following treatments were rendered)   Pre-treatment Symptoms/Complaints:  none  Pain: Initial:   Pain Intensity 1: 0  Post Session:  none     Assessment/Reassessment only, no treatment provided today    Braces/Orthotics/Lines/Etc:   · IV  · O2 Device: Room air  Treatment/Session Assessment:    · Response to Treatment:  Fair  · Interdisciplinary Collaboration:   o Registered Nurse  · After treatment position/precautions:   o Up in chair  o Bed alarm/tab alert on  o Call light within reach  o RN notified  o Family at bedside   · Compliance with Program/Exercises:  Will assess as treatment progresses  · Recommendations/Intent for next treatment session: \"Next visit will focus on advancements to more challenging activities and reduction in assistance provided\".   Total Treatment Duration:  PT Patient Time In/Time Out  Time In: 1019  Time Out: C/ Emily [address], PT

## 2019-03-10 NOTE — PROGRESS NOTES
Hospitalist Progress Note     Admit Date:  3/8/2019 10:34 AM   Name:  Jorge Marques   Age:  80 y.o.  :  1920   MRN:  [id number]   PCP:  Marylou Degladillo MD  Treatment Team: Attending Provider: Cullen Villafuerte MD; Consulting Provider: Nishant Lo MD; Occupational Therapist: Carmina Carbajal, OT; Utilization Review: Yeni Cooper RN; Physical Therapist: Angie Lockwood PT; Charge Nurse: Saloni Milligan RN    Subjective:     From H and P hpi:     CC:  Confusion and nausea/ emesis     Mr. Kellie Sparrow is a 79 yo male evaluated with nausea, emesis and confusion. He reportedly [address] and was noted to have large amounts of emesis in his garbage and to be more confused that his baseline. ED evaluation shows PANKAJ creatinine of 3.0. KUB nonspecific and CTAP pending though he is refusing to take oral contrast due to confusion. Also unable to pass darden due to stricture. CT head negative. Attempted to contact son and received voice mail      3/9/19:     Ct abd. With likely psbo with cutoff rlq, but now several loose bm's. PANKAJ with oliguria but not accurate mesaurement, now has darden. Alert and cooperative at time my exam. Creatinine increased at 3.4 with admit creat. 3 and remote baseline around 1.3. He is 80 y.o.. And prognosis is guarded. 3/10/19:   hospital delerium. I spoke with daughter in law at bedside. Hx recurrent hospitalizations and family aware he is at end of life. Was living independ. Prior to admission and working to find assisted living facility. Family would be interested in hospice care but unclear if logistically would be possible if discharged to assisted livii. She wishes to discuss. We discussed trial of risperdal and stop welbutrin which can be agitating. Tolerated clear liquids.  Creat improving with ivf.      10 systems not possible due to confusion             Objective:     Patient Vitals for the past 24 hrs:   Temp Pulse Resp BP SpO2   03/10/19 0740 97.7 °F (36.5 °C) 63 17 148/61 96 %   03/10/19 0458 97.5 °F (36.4 °C) 68 18 131/67 93 %   03/09/19 2356 97.7 °F (36.5 °C) 70 18 138/63 95 %   03/09/19 1535 98.4 °F (36.9 °C) 65 17 134/62 97 %   03/09/19 1149 97.2 °F (36.2 °C) 71 17 131/54 96 %     Oxygen Therapy  O2 Sat (%): 96 % (03/10/19 0740)  Pulse via Oximetry: 70 beats per minute (03/08/19 1403)  O2 Device: Room air (03/08/19 1037)    Intake/Output Summary (Last 24 hours) at 3/10/2019 0935  Last data filed at 3/10/2019 0603  Gross per 24 hour   Intake 394 ml   Output 1125 ml   Net -731 ml         Physical Examination:  Physical Exam   Constitutional:   delerium   HENT:   Nose: Nose normal.   Mouth/Throat: No oropharyngeal exudate. Eyes: Conjunctivae are normal. No scleral icterus. Neck: No JVD present. No thyromegaly present. Cardiovascular: Normal rate. Exam reveals no gallop and no friction rub. Pulmonary/Chest: He has no wheezes. He has no rales. Abdominal: Soft. He exhibits no distension. There is no tenderness. Musculoskeletal: He exhibits no tenderness or deformity. Neurological: He exhibits normal muscle tone. delerium   Skin: Skin is dry. No rash noted. He is not diaphoretic. Data Review:  I have reviewed all labs, meds, telemetry events, and studies from the last 24 hours.     Recent Results (from the past 24 hour(s))   LACTIC ACID    Collection Time: 03/09/19 11:39 AM   Result Value Ref Range    Lactic acid 1.0 0.4 - 2.0 MMOL/L   METABOLIC PANEL, BASIC    Collection Time: 03/09/19 11:39 AM   Result Value Ref Range    Sodium 142 136 - 145 mmol/L    Potassium 4.8 3.5 - 5.1 mmol/L    Chloride 106 98 - 107 mmol/L    CO2 30 21 - 32 mmol/L    Anion gap 6 (L) 7 - 16 mmol/L    Glucose 105 (H) 65 - 100 mg/dL    BUN 43 (H) 8 - 23 MG/DL    Creatinine 2.87 (H) 0.8 - 1.5 MG/DL    GFR est AA 26 (L) >60 ml/min/1.73m2    GFR est non-AA 22 (L) >60 ml/min/1.73m2    Calcium 8.4 8.3 - 10.4 MG/DL   LACTIC ACID    Collection Time: 03/09/19  4:15 PM   Result Value Ref Range    Lactic acid 1.5 0.4 - 2.0 MMOL/L   LACTIC ACID    Collection Time: 03/10/19 12:37 AM   Result Value Ref Range    Lactic acid 1.1 0.4 - 2.0 MMOL/L   CBC WITH AUTOMATED DIFF    Collection Time: 03/10/19  6:39 AM   Result Value Ref Range    WBC 2.3 (L) 4.3 - 11.1 K/uL    RBC 3.06 (L) 4.23 - 5.6 M/uL    HGB 10.6 (L) 13.6 - 17.2 g/dL    HCT 32.7 (L) 41.1 - 50.3 %    .9 (H) 79.6 - 97.8 FL    MCH 34.6 (H) 26.1 - 32.9 PG    MCHC 32.4 31.4 - 35.0 g/dL    RDW 13.2 11.9 - 14.6 %    PLATELET 335 191 - 039 K/uL    MPV 8.7 (L) 9.4 - 12.3 FL    ABSOLUTE NRBC 0.00 0.0 - 0.2 K/uL    DF AUTOMATED      NEUTROPHILS 70 43 - 78 %    LYMPHOCYTES 18 13 - 44 %    MONOCYTES 10 4.0 - 12.0 %    EOSINOPHILS 2 0.5 - 7.8 %    BASOPHILS 0 0.0 - 2.0 %    IMMATURE GRANULOCYTES 0 0.0 - 5.0 %    ABS. NEUTROPHILS 1.6 (L) 1.7 - 8.2 K/UL    ABS. LYMPHOCYTES 0.4 (L) 0.5 - 4.6 K/UL    ABS. MONOCYTES 0.2 0.1 - 1.3 K/UL    ABS. EOSINOPHILS 0.1 0.0 - 0.8 K/UL    ABS. BASOPHILS 0.0 0.0 - 0.2 K/UL    ABS. IMM. GRANS. 0.0 0.0 - 0.5 K/UL   LACTIC ACID    Collection Time: 03/10/19  6:39 AM   Result Value Ref Range    Lactic acid 0.8 0.4 - 2.0 MMOL/L   METABOLIC PANEL, COMPREHENSIVE    Collection Time: 03/10/19  6:39 AM   Result Value Ref Range    Sodium 141 136 - 145 mmol/L    Potassium 3.8 3.5 - 5.1 mmol/L    Chloride 108 (H) 98 - 107 mmol/L    CO2 27 21 - 32 mmol/L    Anion gap 6 (L) 7 - 16 mmol/L    Glucose 80 65 - 100 mg/dL    BUN 27 (H) 8 - 23 MG/DL    Creatinine 1.49 0.8 - 1.5 MG/DL    GFR est AA 56 (L) >60 ml/min/1.73m2    GFR est non-AA 46 (L) >60 ml/min/1.73m2    Calcium 8.0 (L) 8.3 - 10.4 MG/DL    Bilirubin, total 0.6 0.2 - 1.1 MG/DL    ALT (SGPT) 11 (L) 12 - 65 U/L    AST (SGOT) 15 15 - 37 U/L    Alk.  phosphatase 76 50 - 136 U/L    Protein, total 5.8 (L) 6.3 - 8.2 g/dL    Albumin 2.9 (L) 3.2 - 4.6 g/dL    Globulin 2.9 2.3 - 3.5 g/dL    A-G Ratio 1.0 (L) 1.2 - 3.5          All Micro Results     None          Current Meds:  Current Facility-Administered Medications   Medication Dose Route Frequency    risperiDONE (RisperDAL) tablet 0.5 mg  0.5 mg Oral Q12H    0.9% sodium chloride infusion  100 mL/hr IntraVENous CONTINUOUS    LORazepam (ATIVAN) injection 1 mg  1 mg IntraVENous Q4H PRN    pantoprazole (PROTONIX) 40 mg in sodium chloride 0.9% 10 mL injection  40 mg IntraVENous DAILY    sodium chloride (NS) flush 5-40 mL  5-40 mL IntraVENous Q8H    sodium chloride (NS) flush 5-40 mL  5-40 mL IntraVENous PRN    acetaminophen (TYLENOL) tablet 650 mg  650 mg Oral Q6H PRN    HYDROcodone-acetaminophen (NORCO) 5-325 mg per tablet 1 Tab  1 Tab Oral Q4H PRN    morphine injection 1 mg  1 mg IntraVENous Q4H PRN    naloxone (NARCAN) injection 0.4 mg  0.4 mg IntraVENous PRN    ondansetron (ZOFRAN) injection 4 mg  4 mg IntraVENous Q4H PRN       Diet:  DIET CLEAR LIQUID    Other Studies (last 24 hours):  Xr Abd Acute W 1 V Chest    Result Date: 3/10/2019  Abdominal Series INDICATION:  Follow-up small bowel obstruction A single view of the chest and 2 views of the abdomen were obtained. FINDINGS: The lungs are clear. There are no infiltrates or effusions. There is stable cardiomegaly. Flat and upright views of the abdomen show decreased small bowel distention. Bowel pattern is now near normal.  There is no free air. No renal calculi are seen. IMPRESSION:  Decreased small bowel distention     Xr Abd Acute W 1 V Chest    Result Date: 3/9/2019  Abdominal Series INDICATION: Follow-up bowel obstruction A single view of the chest and 2 views of the abdomen were obtained. FINDINGS: The lungs are clear. There are no infiltrates or effusions. There is stable cardiomegaly. Flat and upright views of the abdomen show stable bowel distention. There is no free air. No renal calculi are seen.      IMPRESSION:  Persistent bowel distention       Assessment and Plan:     Hospital Problems as of 3/10/2019 Date Reviewed: 2/21/2019          Codes Class Noted - Resolved POA    Nausea and vomiting ICD-10-CM: R11.2  ICD-9-CM: 787.01  3/8/2019 - Present Yes        PANKAJ (acute kidney injury) ([email or web address] ICD-10-CM: N17.9  ICD-9-CM: 584.9  3/8/2019 - Present Yes        Acute metabolic encephalopathy MXR-19-XE: G93.41  ICD-9-CM: 348.31  3/8/2019 - Present Yes        * (Principal) Partial small bowel obstruction ([facility] [email or web address] ICD-10-CM: K56.600  ICD-9-CM: 560.9  3/8/2019 - Present Yes              A/P:    · PANKAJ:   ·       improving with ivf. Continue  Hydration    · Acute encephalopathy:   § Acute illness, cultures pending. Metabolic encephalopathy and some component of hospital delerium secondary to baseline dementia. No clear etoh hx. --  § Stop wellbutrin, risperdal bid if no po try geodon. §   · Partial sbo - clinically resolving:   ? Clear liquids no surgery planned.  Family wants to consider hospice care if feasible.     Discharge planning:  PPD, pending clinical course--at least assisted living vs other  DVT ppx: SCD  Code status:  dnr

## 2019-03-10 NOTE — PROGRESS NOTES
Problem: Self Care Deficits Care Plan (Adult)  Goal: *Acute Goals and Plan of Care (Insert Text)  1. Patient will complete upper body bathing and dressing with minimal assistance and adaptive equipment as needed. 2. Patient will complete simple grooming and self-feeding tasks with SBA. 3. Patient will tolerate 30 minutes of OT treatment with 2-3 rest breaks to increase activity tolerance for ADLs. 4. Patient will complete functional transfers with CGA and adaptive equipment as needed. 5. Patient will complete functional mobility for household distances with CGA and moderate cues for safety. Timeframe: 7 visits       OCCUPATIONAL THERAPY: Initial Assessment, Daily Note and AM 3/10/2019  INPATIENT: OT Visit Days: 1  [place] / Plan[place] B / Product Type: Medicare /      NAME/AGE/GENDER: Daquan Dann is a 80 y.o. male   PRIMARY DIAGNOSIS:  Nausea and vomiting [R11.2] Partial small bowel obstruction (HCC) Partial small bowel obstruction (HCC)       ICD-10: Treatment Diagnosis:    · Generalized Muscle Weakness (M62.81)  · Other lack of cordination (R27.8)  · Repeated Falls (R29.6)   Precautions/Allergies:     Patient has no known allergies. ASSESSMENT:     Mr. Wu Ho was admitted with nausea and vomiting and partial SBO. Pt lives alone in an independent living facility and has \"[facility]\" come in 3x per week to help with his bathing/dressing and daily for his medications. Pt uses a walker for functional mobility at baseline but tends to forget how to safely use it. Pt has a hx significant for falls. This session, pt presented sitting up in the chair. Pt demonstrated deficits in cognition, safety awareness, and dynamic standing balance impacting independence and safety with ADLs. Pt demonstrates functional use of UEs as observed with activity but does not follow commands for formal assessment. Pt is easily distracted with decreased attention to tasks.  Pt answers off subject to questions but remained calm and cooperative during assessment and treatment. Pt stood from the chair with minimal assistance to the walker. Pt stood for ~10 minutes at the walker and engaged in conversation with supportive daughter-in-law at bedside. Pt finally agreeable to some functional mobility from the chair to the entrance of his room. Pt demonstrates poor standing balance even with use of walker and has poor awareness of safety. Pt require moderate verbal and tactile cueing for walker management. Pt tried to push the walker to the side once approaching the chair and flopped down into the chair. Pt's food tray arrived. Pt was able to hold a cup in his hand to drink with set-up and cues for initiation. Pt declined to eat anything but family states they have had to assist him with self-feeding (normally does this on his own). At the end of the session, pt was left up in the chair with alarm in place, daughter-in-law at bedside, and with needs in reach. Daughter-in-law educated to notify staff if she needs to leave. Pt is a high fall risk and living alone is a unsafe plan for this patient at this time. Pt presented below functional baseline and would benefit from skilled OT services to address deficits. This section established at most recent assessment   PROBLEM LIST (Impairments causing functional limitations):  1. Decreased Strength  2. Decreased ADL/Functional Activities  3. Decreased Transfer Abilities  4. Decreased Ambulation Ability/Technique  5. Decreased Balance  6. Decreased Activity Tolerance  7. Increased Fatigue  8. Decreased [place] with Home Exercise Program  9. Decreased Cognition   INTERVENTIONS PLANNED: (Benefits and precautions of occupational therapy have been discussed with the patient.)  1. Activities of daily living training  2. Adaptive equipment training  3. Balance training  4. Clothing management  5. Cognitive training  6. Neuromuscular re-eduation  7.  Therapeutic activity  8. Therapeutic exercise     TREATMENT PLAN: Frequency/Duration: Follow patient 3 times per week to address above goals. Rehabilitation Potential For Stated Goals: Good     RECOMMENDED REHABILITATION/EQUIPMENT: (at time of discharge pending progress): Due to the probability of continued deficits (see above) this patient will likely need continued skilled occupational therapy after discharge. Equipment:    TBD              OCCUPATIONAL PROFILE AND HISTORY:   History of Present Injury/Illness (Reason for Referral):  See H&P  Past Medical History/Comorbidities:   Mr. Kellie Sparrow  has a past medical history of Acute kidney injury ([facility] (12/31/2018), CAP (community acquired pneumonia) (12/31/2018), Diverticulitis, Essential (primary) hypertension, GERD (gastroesophageal reflux disease), Hyperlipidemia, Memory loss, Osteoporosis, Pulmonary embolism ([facility] [facility] Varicella, and Volvulus of colon ([facility] [facility] Mr. Kellie Sparrow  has a past surgical history that includes hx lap cholecystectomy; hx hernia repair; and hx tonsil and adenoidectomy. Social History/Living Environment:   Home Environment: Independent living  One/Two Story Residence: One story  Living Alone: Yes  Support Systems: Family member(s)([facility])  Patient Expects to be Discharged to[de-identified] Unknown  Current DME Used/Available at Home: Walker, rolling  Prior Level of Function/Work/Activity:  Pt lives alone in an independent living facility and has \"[facility]\" come in 3x per week to help with his bathing/dressing and daily for his medications. Pt uses a walker for functional mobility at baseline but tends to forget how to safely use it. Pt has a hx significant for falls.   Personal Factors:          Social Background:  Lives alone in independent living        Past/Current Experience: memory loss and hx of falls        Other factors that influence how disability is experienced by the patient:  Multiple co-morbidities-see above   Number of Personal Factors/Comorbidities that affect the Plan of Care: Extensive review of physical, cognitive, and psychosocial performance (3+):  HIGH COMPLEXITY   ASSESSMENT OF OCCUPATIONAL PERFORMANCE[de-identified]   Activities of Daily Living:   Basic ADLs (From Assessment) Complex ADLs (From Assessment)   Feeding: Maximum assistance  Oral Facial Hygiene/Grooming: Maximum assistance  Bathing: Total assistance  Upper Body Dressing: Total assistance  Lower Body Dressing: Total assistance  Toileting: Total assistance Instrumental ADL  Meal Preparation: Total assistance  Homemaking: Total assistance   Grooming/Bathing/Dressing Activities of Daily Living     Cognitive Retraining  Safety/Judgement: Fall prevention;Home safety     Feeding  Drink to Mouth: Supervision/set-up; Stand-by assistance                 Bed/Mat Mobility  Rolling: Independent  Supine to Sit: Contact guard assistance  Sit to Stand: Minimum assistance  Bed to Chair: Minimum assistance  Scooting: Supervision       Most Recent Physical Functioning:   Gross Assessment:  AROM: Within functional limits  Strength: Generally decreased, functional  Coordination: Generally decreased, functional               Posture:  Posture (WDL): Exceptions to WDL  Posture Assessment: Rounded shoulders, Forward head  Balance:  Sitting: Impaired  Sitting - Static: Fair (occasional)  Sitting - Dynamic: Fair (occasional)  Standing: Impaired; With support  Standing - Static: Fair  Standing - Dynamic : Poor Bed Mobility:  Rolling: Independent  Supine to Sit: Contact guard assistance  Scooting: Supervision  Wheelchair Mobility:     Transfers:  Sit to Stand: Minimum assistance  Stand to Sit: Moderate assistance  Bed to Chair: Minimum assistance            Patient Vitals for the past 6 hrs:   BP SpO2 Pulse   03/10/19 0740 148/61 96 % 63   03/10/19 1105 109/56 93 % (!) 54       Mental Status  Neurologic State: Alert, Confused  Orientation Level: Oriented to place  Cognition: Decreased attention/concentration, Decreased command following, Impaired decision making, Impulsive, Memory loss, Poor safety awareness  Perception: Appears intact  Perseveration: No perseveration noted  Safety/Judgement: Fall prevention, Home safety                          Physical Skills Involved:  1. Balance  2. Strength  3. Activity Tolerance Cognitive Skills Affected (resulting in the inability to perform in a timely and safe manner):  1. Executive Function  2. Short Term Recall  3. Sustained Attention  4. Divided Attention  5. Comprehension Psychosocial Skills Affected:  1. Habits/Routines  2. Environmental Adaptation  3. Self-Awareness   Number of elements that affect the Plan of Care: 5+:  HIGH COMPLEXITY   CLINICAL DECISION MAKING:   [facility] AM-PAC 6 Clicks   Daily Activity Inpatient Short Form  How much help from another person does the patient currently need. .. Total A Lot A Little None   1. Putting on and taking off regular lower body clothing? [x] 1   [] 2   [] 3   [] 4   2. Bathing (including washing, rinsing, drying)? [x] 1   [] 2   [] 3   [] 4   3. Toileting, which includes using toilet, bedpan or urinal?   [x] 1   [] 2   [] 3   [] 4   4. Putting on and taking off regular upper body clothing? [x] 1   [] 2   [] 3   [] 4   5. Taking care of personal grooming such as brushing teeth? [] 1   [x] 2   [] 3   [] 4   6. Eating meals? [] 1   [x] 2   [] 3   [] 4   © 2007, Trustees of [facility], under license to [employer]. All rights reserved      Score:  Initial: 8 Most Recent: X (Date: -- )    Interpretation of Tool:  Represents activities that are increasingly more difficult (i.e. Bed mobility, Transfers, Gait). Medical Necessity:     · Patient demonstrates good and fair rehab potential due to higher previous functional level. Reason for Services/Other Comments:  · Patient continues to require skilled intervention due to decreased independence and safety with ADL/functional transfers.    Use of outcome tool(s) and clinical judgement create a POC that gives a: MODERATE COMPLEXITY         TREATMENT:   (In addition to Assessment/Re-Assessment sessions the following treatments were rendered)     Pre-treatment Symptoms/Complaints:    Pain: Initial:   Pain Intensity 1: 0  Post Session:  0/10     Therapeutic Activity: (    25 minutes): Therapeutic activities including Chair transfers, Ambulation on level ground and standing tolerance to improve mobility, strength, balance and coordination. Required moderate visual, verbal, and tactile cues   to promote static and dynamic balance in standing.     n/a    Braces/Orthotics/Lines/Etc:   · IV  · darden catheter  · O2 Device: Room air  Treatment/Session Assessment:    · Response to Treatment:  Pt was cooperative but demonstrates poor safety awareness. · Interdisciplinary Collaboration:   o Occupational Therapist  o Registered Nurse  · After treatment position/precautions:   o Up in chair  o Bed alarm/tab alert on  o Bed/Chair-wheels locked  o Call light within reach  o RN notified  o Family at bedside   · Compliance with Program/Exercises: Will assess as treatment progresses. · Recommendations/Intent for next treatment session: \"Next visit will focus on advancements to more challenging activities and reduction in assistance provided\".   Total Treatment Duration:  OT Patient Time In/Time Out  Time In: 1125  Time Out: [address]   [address], [place]

## 2019-03-10 NOTE — PROGRESS NOTES
Progress Note/Office Note:   Rachna May  MRN: [id number]  NGZ:[id number]  Age:89 y.o. General Surgery Consult ordered by: Dr. Shira Manzanares  Reason for General Surgery Consult: Partial SBO    HPI: Rachna May is a 80 y.o. male who presented to the ED for evaluation on 3/8/19. Pt currently reside in [facility] and was noted to be more confused than baseline and a large amount of emesis was found in his garbage can. ED evaluation shows PANKAJ creatinine of 3.0. KUB nonspecific and CT of Abd and Pelvis showing New high-grade partial small bowel distal obstruction. 3/8/19: Pt resting in bed. Not oriented to person or place. Denies abd pain, nausea, and vomiting. Unable to recall last BM. Reports flatus earlier today. 3/9/19: Pt up to bathroom. Alert and cooperative. Large BM x 2. Denies abd pain, nausea, and vomiting. Gary patent. AF, NAD.    3/10/19: Alert and cooperative. Pt sitting in recliner eating lunch. Feeling much better. Tolerating clear liquids. Large BM x 2 yesterday. Denies abd pain, nausea, and vomiting. Gary patent. AF, NAD. Past Medical History:   Diagnosis Date    Acute kidney injury ([address] 12/31/2018    CAP (community acquired pneumonia) 12/31/2018    Diverticulitis     Essential (primary) hypertension     GERD (gastroesophageal reflux disease)     Hyperlipidemia     Memory loss     Osteoporosis     Pulmonary embolism ([address]     only one episode with simultaneous DVT.   Patient and family deny hypercoaguable work up   Deon Varicella     Volvulus of colon ([address]      Past Surgical History:   Procedure Laterality Date    HX HERNIA REPAIR      HX LAP CHOLECYSTECTOMY      HX TONSIL AND ADENOIDECTOMY       Current Facility-Administered Medications   Medication Dose Route Frequency    risperiDONE (RisperDAL) tablet 0.5 mg  0.5 mg Oral Q12H    0.9% sodium chloride infusion  100 mL/hr IntraVENous CONTINUOUS    LORazepam (ATIVAN) injection 1 mg  1 mg IntraVENous Q4H PRN    pantoprazole (PROTONIX) 40 mg in sodium chloride 0.9% 10 mL injection  40 mg IntraVENous DAILY    sodium chloride (NS) flush 5-40 mL  5-40 mL IntraVENous Q8H    sodium chloride (NS) flush 5-40 mL  5-40 mL IntraVENous PRN    acetaminophen (TYLENOL) tablet 650 mg  650 mg Oral Q6H PRN    HYDROcodone-acetaminophen (NORCO) 5-325 mg per tablet 1 Tab  1 Tab Oral Q4H PRN    morphine injection 1 mg  1 mg IntraVENous Q4H PRN    naloxone (NARCAN) injection 0.4 mg  0.4 mg IntraVENous PRN    ondansetron (ZOFRAN) injection 4 mg  4 mg IntraVENous Q4H PRN     Patient has no known allergies. Social History     Socioeconomic History    Marital status:      Spouse name: Not on file    Number of children: Not on file    Years of education: Not on file    Highest education level: Not on file   Occupational History    Occupation: Industrial Management   Tobacco Use    Smoking status: Former Smoker     Last attempt to quit: 1980     Years since quittin.2    Smokeless tobacco: Never Used   Substance and Sexual Activity    Alcohol use: No     Alcohol/week: 0.0 oz     Frequency: Never     Comment: daily     Social History     Tobacco Use   Smoking Status Former Smoker    Last attempt to quit: 1980    Years since quittin.2   Smokeless Tobacco Never Used     Family History   Problem Relation Age of Onset    Diabetes Mother      ROS: Comprehensive review of systems was otherwise unremarkable except as noted above. Physical Exam:   Visit Vitals  /56   Pulse (!) 54   Temp 97.4 °F (36.3 °C)   Resp 16   Ht 5' 2\" (1.575 m)   Wt 143 lb (64.9 kg)   SpO2 93%   BMI 26.16 kg/m²     Vitals:    19 2356 03/10/19 0458 03/10/19 0740 03/10/19 1105   BP: 138/63 131/67 148/61 109/56   Pulse: 70 68 63 (!) 54   Resp: 18 18 17 16   Temp: 97.7 °F (36.5 °C) 97.5 °F (36.4 °C) 97.7 °F (36.5 °C) 97.4 °F (36.3 °C)   SpO2: 95% 93% 96% 93%   Weight:       Height:         No intake/output data recorded.   1901 - 03/10 0700  In: 867 [I.V.:867]  Out: 1125 [Urine:1125]    Constitutional: Alert, no acute distress   Eyes:Sclera are clear. EOMs intact  ENMT: no external lesions gross hearing normal; no obvious neck masses, no ear or lip lesions, nares normal  CV: RRR. Normal perfusion  Resp: No JVD. Breathing is  non-labored; no audible wheezing. GI: soft, non distended, non-tender, BS active     : darden  Musculoskeletal: No embolic signs or cyanosis. Neuro:  Alert  Psychiatric: Calm    Recent vitals (if inpt):  Patient Vitals for the past 24 hrs:   BP Temp Pulse Resp SpO2   03/10/19 1105 109/56 97.4 °F (36.3 °C) (!) 54 16 93 %   03/10/19 0740 148/61 97.7 °F (36.5 °C) 63 17 96 %   03/10/19 0458 131/67 97.5 °F (36.4 °C) 68 18 93 %   03/09/19 2356 138/63 97.7 °F (36.5 °C) 70 18 95 %   03/09/19 1535 134/62 98.4 °F (36.9 °C) 65 17 97 %       Labs:  Recent Labs     03/10/19  0639  03/08/19  1045   WBC 2.3*   < > 5.7   HGB 10.6*   < > 14.6      < > 275      < > 135*   K 3.8   < > 4.9   *   < > 95*   CO2 27   < > 31   BUN 27*   < > 34*   CREA 1.49   < > 3.00*   GLU 80   < > 144*   TBILI 0.6  --  1.0   SGOT 15  --  17   ALT 11*  --  20   AP 76  --  116   LPSE  --   --  141    < > = values in this interval not displayed. Lab Results   Component Value Date/Time    WBC 2.3 (L) 03/10/2019 06:39 AM    HGB 10.6 (L) 03/10/2019 06:39 AM    PLATELET 457 72/95/2262 06:39 AM    Sodium 141 03/10/2019 06:39 AM    Potassium 3.8 03/10/2019 06:39 AM    Chloride 108 (H) 03/10/2019 06:39 AM    CO2 27 03/10/2019 06:39 AM    BUN 27 (H) 03/10/2019 06:39 AM    Creatinine 1.49 03/10/2019 06:39 AM    Glucose 80 03/10/2019 06:39 AM    INR 1.2 02/14/2019 01:23 PM    aPTT 29.0 02/14/2019 01:23 PM    Bilirubin, total 0.6 03/10/2019 06:39 AM    Bilirubin, direct 0.15 11/19/2018 12:02 PM    AST (SGOT) 15 03/10/2019 06:39 AM    ALT (SGPT) 11 (L) 03/10/2019 06:39 AM    Alk.  phosphatase 76 03/10/2019 06:39 AM    Lipase 141 03/08/2019 10:45 AM Troponin-I, Qt. <0.02 (L) 12/31/2018 12:23 PM       CT Results  (Last 48 hours)               03/08/19 1652  CT ABD PELV WO CONT Final result    Impression:  IMPRESSION: New high-grade partial small bowel distal obstruction. 2. Colonic diverticulosis without diverticulitis. Narrative:  CT of the Abdomen and Pelvis without contrast        INDICATION:  Confusion. Distention       COMPARISON: 02/14/2019       TECHNIQUE: Multiple axial images were obtained through the abdomen and pelvis   without IV contrast. Oral contrast was administered. . Radiation dose reduction   techniques were used for this study:  Our CT scanners use one or all of the   following: Automated exposure control, adjustment of the mA and/or kVp according   to patient's size, iterative reconstruction. FINDINGS:   Abdomen CT:  Coronary artery calcifications calcified left basal granuloma with   bilateral basal lung scarring. Multiple calcified splenic granulomas. The   unenhanced liver is unremarkable. No acute cholecystitis findings. Bilateral   adrenal gland thickening is stable. Negative for hydronephrosis or renal stone. Bilateral renal cysts are unchanged. Atherosclerotic normal caliber abdominal   aorta. Interval development of a high-grade distal partial small bowel   obstruction with air and fluid distended abdominal and pelvic bowel loops. CT PELVIS: There is a right lower quadrant small bowel obstruction transition   point axial image 52 with a few normal caliber distal ileal loop. Colonic   diverticulosis without diverticulitis. No pelvic free fluid or inflammation. The   bones show no acute or aggressive abnormality taking into account degenerative   findings. chest X-ray      I reviewed recent labs, recent radiologic studies, and pertinent records including other doctor notes if needed. I independently reviewed radiology images for studies I described above or studies I have ordered.    Admission date (for inpatients): 3/8/2019   * No surgery found *  * No surgery found *    ASSESSMENT/PLAN:  Problem List  Date Reviewed: 2/21/2019          Codes Class Noted    Nausea and vomiting ICD-10-CM: R11.2  ICD-9-CM: 787.01  3/8/2019        PANKAJ (acute [facility]) ([facility] ICD-10-CM: N17.9  ICD-9-CM: 584.9  3/8/2019        Acute metabolic encephalopathy UZB-38-YR: G93.41  ICD-9-CM: 348.31  3/8/2019        * (Principal) Partial small bowel obstruction ([facility] ICD-10-CM: K56.600  ICD-9-CM: 560.9  3/8/2019        Essential (primary) hypertension ICD-10-CM: I10  ICD-9-CM: 401.9  Unknown        Hyperlipidemia ICD-10-CM: E78.5  ICD-9-CM: 272.4  Unknown        Osteoporosis ICD-10-CM: M81.0  ICD-9-CM: 733.00  Unknown        Memory change ICD-10-CM: R41.3  ICD-9-CM: 780.93  Unknown        GERD (gastroesophageal reflux disease) ICD-10-CM: K21.9  ICD-9-CM: 530.81  Unknown            Principal Problem:    Partial small bowel obstruction ([facility] (3/8/2019)    Active Problems:    Nausea and vomiting (3/8/2019)      PANKAJ (acute kidney injury) ([facility] (3/8/2019)      Acute metabolic encephalopathy (6/2/3661)       Plan:  Care management per Hospitalist  Urology Consulted - darden placed/ PANKAJ  Clear Liquids diet advance as tolerated  General Surgery will sign off at this time. Thank you for the consult. Please call for questions or concerns.        Sergio Velasco NP

## 2019-03-10 NOTE — PROGRESS NOTES
Patient was agitated at times, trying to get out of the bed and leave the unit, and became aggressively. Also attempted to pull his Gary out, put under lap belt. MD made aware and ordered prn Ativan. Patient given Ativan once. No BM reported for this shift, patient refused to be assessed, c/o no nausea or vomiting. Hourly rounds performed;all pt needs met. Bed in low position and call light/ personal items within reach. Will continue to monitor and give bedside shift report to oncoming day shift nurse.

## 2019-03-10 NOTE — PROGRESS NOTES
Code grey called. Patient became aggressive and disoriented. Did not want to stay in room and wanted to leave unit to go home. Calmed down. Back in bed.

## 2019-03-11 PROBLEM — M13.80 ALLERGIC ARTHRITIS: Status: ACTIVE | Noted: 2019-01-01

## 2019-03-11 PROBLEM — K92.1 BLOOD IN STOOL: Status: ACTIVE | Noted: 2019-01-01

## 2019-03-11 NOTE — HOSPICE
Pt arrived at 1800 via EMS. No family present. Pt non verbal at this time. Responsive to stimuli with agitation. FLACC 3/10. Pt with excess snoring at this time. Was medicated with ativan prior to discharge at hospital. Stage II x2 to right scapula. Excoriation to sacrum, scrotum. Skin shear to right forearm. Gary intact and draining yellow urine with sediment. PIV to right AC.      Report given to Desi Saavedra

## 2019-03-11 NOTE — PROGRESS NOTES
Urine [place] obtained from darden & sent to lab. Urine creatinine 51 & urine sodium 166. Darden patent & draining clear yellow urine.

## 2019-03-11 NOTE — PROGRESS NOTES
Report received. Pt round. Pt identified by name. In bed with eyes closed. No s/s of pain, agitation or dyspnea. Bed low and SR up with tab alerts on.     2010 Morphine 2 mg IV for premedication prior to care and Haldol 2 mg IV for agitation. Pt throwing legs over side of bed. 2052 Pt calm and resting with eyes closed. No s/s of discomfort or distress at this time, but snoring loudly. Repositioned to decrease snoring.

## 2019-03-11 NOTE — DISCHARGE SUMMARY
Hospitalist Discharge Summary     Admit Date:  3/8/2019 10:34 AM   Name:  Maribell Ozuna   Age:  80 y.o.  :  1920   MRN:  [id number]   PCP:  Madhu Machado MD  Treatment Team: Attending Provider: Malcolm Arauz DO; Utilization Review: Miguelangel Rowe RN; Consulting Provider: Marge Alvarenga NP; Care Manager: Della Arreola RN    Problem List for this Hospitalization:  Hospital Problems as of 3/11/2019 Date Reviewed: 2019          Codes Class Noted - Resolved POA    Debility ICD-10-CM: R53.81  ICD-9-CM: 799.3  Unknown - Present Unknown        Encounter for palliative care ICD-10-CM: Z51.5  ICD-9-CM: V66.7  Unknown - Present Unknown        Nausea and vomiting ICD-10-CM: R11.2  ICD-9-CM: 787.01  3/8/2019 - Present Yes        PANKAJ (acute kidney injury) ([facility] [address] ICD-10-CM: N17.9  ICD-9-CM: 584.9  3/8/2019 - Present Yes        Acute metabolic encephalopathy YOK-29-VB: G93.41  ICD-9-CM: 348.31  3/8/2019 - Present Yes        * (Principal) Partial small bowel obstruction ([facility] [address] ICD-10-CM: K56.600  ICD-9-CM: 560.9  3/8/2019 - Present Yes                Admission HPI from 3/8/2019:    \"From H and P hpi:     CC:  Confusion and nausea/ emesis     Mr. Jaycee Bautista is a 79 yo male evaluated with nausea, emesis and confusion. He reportedly lives at [facility] and was noted to have large amounts of emesis in his garbage and to be more confused that his baseline. ED evaluation shows PANKAJ creatinine of 3.0. KUB nonspecific and CTAP pending though he is refusing to take oral contrast due to confusion. Also unable to pass darden due to stricture. CT head negative. Attempted to contact son and received voice mail  [facility] [place] [facility] Course:  Partial sbo relieved with out surgical intervention. PANKAJ responded to ivf. Hospital associated delerium. Family requests hospice care as pt is at end of life and recent frequent hospitalizations. Tolerating liquids but delerium and not eating. Sleeping following im geodon.  Has been accepted at hospice house and goal is comfort and dignity in his remaining life. Follow up instructions and discharge meds at bottom of this note. Plan was discussed with family and staff. All questions answered. Patient was stable at time of discharge. 10 systems reviewed and negative except as noted in HPI. Diagnostic Imaging/Tests:   Xr Chest Sngl V    Result Date: 3/8/2019  History: Lactic acidosis Exam: portable chest Comparison: 12/31/2018 Findings: No focal alveolar infiltrate or pleural effusion. No change in the appearance of the mediastinal contour or osseous structures. Impressions: Stable portable chest. No acute abnormality. Xr Abd (kub)    Result Date: 3/8/2019  KUB INDICATION: Vomiting this morning for several hours. Severe abdominal pain FINDINGS: 2 supine images were submitted. Comparison is made to CT scan 02/14/2019. The lung bases are clear. No suspicious masses or calcifications. Mild lumbar levoscoliosis with thoracolumbar degenerative changes. There is a nonspecific bowel gas pattern. No suspicious masses or calcifications. IMPRESSION: Nonspecific bowel gas pattern. Xr Abd Acute W 1 V Chest    Result Date: 3/9/2019  Abdominal Series INDICATION: Follow-up bowel obstruction A single view of the chest and 2 views of the abdomen were obtained. FINDINGS: The lungs are clear. There are no infiltrates or effusions. There is stable cardiomegaly. Flat and upright views of the abdomen show stable bowel distention. There is no free air. No renal calculi are seen. IMPRESSION:  Persistent bowel distention     Ct Head Wo Cont    Result Date: 3/8/2019  CT Head without contrast   Indication: Possible fall.  Altered mental status Comparison:  12/31/2018  Procedure: 5 mm axial images were obtained from skull base to vertex without contrast. Radiation dose reduction techniques were used for this study:  Our CT scanners use one or all of the following: Automated exposure control, adjustment of the mA and/or kVp according to patient's size, iterative reconstruction. Findings: The ventricular size and configuration is normal given age-appropriate diffuse cortical atrophy. White matter hypodensities compatible with microvascular ischemia. Bilateral basal ganglia calcifications. Negative for intraparenchymal hemorrhage, extra-axial fluid collection, or acute cortical based infarct. Small vessel and hyperacute infarcts may not be evident on initial CT imaging. Diffuse cortical and cerebellar involutional change. The basilar cisterns are patent. Mastoid air cells and paranasal sinuses are clear. The calvarium is intact. IMPRESSION: No acute intracranial abnormality. 2. Microvascular ischemia. Ct Abd Pelv Wo Cont    Result Date: 3/8/2019  CT of the Abdomen and Pelvis without contrast INDICATION:  Confusion. Distention COMPARISON: 02/14/2019 TECHNIQUE: Multiple axial images were obtained through the abdomen and pelvis without IV contrast. Oral contrast was administered. . Radiation dose reduction techniques were used for this study:  Our CT scanners use one or all of the following: Automated exposure control, adjustment of the mA and/or kVp according to patient's size, iterative reconstruction. FINDINGS: Abdomen CT:  Coronary artery calcifications calcified left basal granuloma with bilateral basal lung scarring. Multiple calcified splenic granulomas. The unenhanced liver is unremarkable. No acute cholecystitis findings. Bilateral adrenal gland thickening is stable. Negative for hydronephrosis or renal stone. Bilateral renal cysts are unchanged. Atherosclerotic normal caliber abdominal aorta. Interval development of a high-grade distal partial small bowel obstruction with air and fluid distended abdominal and pelvic bowel loops. CT PELVIS: There is a right lower quadrant small bowel obstruction transition point axial image 52 with a few normal caliber distal ileal loop.  Colonic diverticulosis without diverticulitis. No pelvic free fluid or inflammation. The bones show no acute or aggressive abnormality taking into account degenerative findings. IMPRESSION: New high-grade partial small bowel distal obstruction. 2. Colonic diverticulosis without diverticulitis. Echocardiogram results:  No results found for this visit on 03/08/19.       All Micro Results     None          Labs: Results:       BMP, Mg, Phos Recent Labs     03/11/19  0616 03/10/19  0639 03/09/19  1139    141 142   K 3.9 3.8 4.8   * 108* 106   CO2 23 27 30   AGAP 11 6* 6*   BUN 15 27* 43*   CREA 1.01 1.49 2.87*   CA 8.8 8.0* 8.4   GLU 83 80 105*      CBC Recent Labs     03/11/19  0616 03/10/19  0639 03/09/19  0445   WBC 3.9* 2.3* 4.4   RBC 3.56* 3.06* 3.28*   HGB 12.0* 10.6* 11.2*   HCT 37.2* 32.7* 34.6*    154 205   GRANS 70 70 71   LYMPH 16 18 20   EOS 2 2 1   MONOS 11 10 8   BASOS 0 0 0   IG 1 0 0   ANEU 2.7 1.6* 3.1   ABL 0.6 0.4* 0.9   SKYLER 0.1 0.1 0.1   ABM 0.4 0.2 0.3   ABB 0.0 0.0 0.0   AIG 0.0 0.0 0.0      LFT Recent Labs     03/10/19  0639   SGOT 15   ALT 11*   AP 76   TP 5.8*   ALB 2.9*   GLOB 2.9   AGRAT 1.0*      Cardiac Testing Lab Results   Component Value Date/Time    Troponin-I, Qt. <0.02 (L) 12/31/2018 12:23 PM      Coagulation Tests Lab Results   Component Value Date/Time    Prothrombin time 15.0 (H) 02/14/2019 01:23 PM    INR 1.2 02/14/2019 01:23 PM    INR 1.0 09/06/2017 04:14 PM    INR 1.0 09/06/2017 03:55 PM    aPTT 29.0 02/14/2019 01:23 PM      A1c Lab Results   Component Value Date/Time    Hemoglobin A1c 5.1 02/25/2018 04:12 AM      Lipid Panel Lab Results   Component Value Date/Time    Cholesterol, total 147 11/19/2018 12:02 PM    HDL Cholesterol 66 11/19/2018 12:02 PM    LDL, calculated 70 11/19/2018 12:02 PM    VLDL, calculated 11 11/19/2018 12:02 PM    Triglyceride 56 11/19/2018 12:02 PM    CHOL/HDL Ratio 2.3 02/25/2018 04:12 AM      Thyroid Panel Lab Results   Component Value Date/Time    TSH 0.469 01/01/2019 02:56 AM    TSH 0.392 02/25/2018 04:12 AM        Most Recent UA Lab Results   Component Value Date/Time    Color YELLOW 12/31/2018 11:14 PM    Appearance CLEAR 12/31/2018 11:14 PM    Specific gravity 1.017 12/31/2018 11:14 PM    pH (UA) 6.0 12/31/2018 11:14 PM    Protein NEGATIVE  12/31/2018 11:14 PM    Glucose NEGATIVE  12/31/2018 11:14 PM    Ketone NEGATIVE  12/31/2018 11:14 PM    Bilirubin NEGATIVE  12/31/2018 11:14 PM    Blood NEGATIVE  12/31/2018 11:14 PM    Urobilinogen 1.0 12/31/2018 11:14 PM    Nitrites NEGATIVE  12/31/2018 11:14 PM    Leukocyte Esterase NEGATIVE  12/31/2018 11:14 PM        No Known Allergies  Immunization History   Administered Date(s) Administered    Influenza High Dose Vaccine PF 09/06/2017, 10/30/2018    Influenza Vaccine 10/01/2016    Pneumococcal Conjugate (PCV-13) 10/01/2015    Pneumococcal Polysaccharide (PPSV-23) 10/01/2016    TB Skin Test (PPD) Intradermal 03/08/2019    Tdap 09/06/2017       All Labs from Last 24 Hrs:  Recent Results (from the past 24 hour(s))   LACTIC ACID    Collection Time: 03/10/19  4:52 PM   Result Value Ref Range    Lactic acid 1.0 0.4 - 2.0 MMOL/L   METABOLIC PANEL, BASIC    Collection Time: 03/11/19  6:16 AM   Result Value Ref Range    Sodium 142 136 - 145 mmol/L    Potassium 3.9 3.5 - 5.1 mmol/L    Chloride 108 (H) 98 - 107 mmol/L    CO2 23 21 - 32 mmol/L    Anion gap 11 7 - 16 mmol/L    Glucose 83 65 - 100 mg/dL    BUN 15 8 - 23 MG/DL    Creatinine 1.01 0.8 - 1.5 MG/DL    GFR est AA >60 >60 ml/min/1.73m2    GFR est non-AA >60 >60 ml/min/1.73m2    Calcium 8.8 8.3 - 10.4 MG/DL   CBC WITH AUTOMATED DIFF    Collection Time: 03/11/19  6:16 AM   Result Value Ref Range    WBC 3.9 (L) 4.3 - 11.1 K/uL    RBC 3.56 (L) 4.23 - 5.6 M/uL    HGB 12.0 (L) 13.6 - 17.2 g/dL    HCT 37.2 (L) 41.1 - 50.3 %    .5 (H) 79.6 - 97.8 FL    MCH 33.7 (H) 26.1 - 32.9 PG    MCHC 32.3 31.4 - 35.0 g/dL    RDW 12.6 11.9 - 14.6 % PLATELET 046 866 - 060 K/uL    MPV 9.0 (L) 9.4 - 12.3 FL    ABSOLUTE NRBC 0.00 0.0 - 0.2 K/uL    DF AUTOMATED      NEUTROPHILS 70 43 - 78 %    LYMPHOCYTES 16 13 - 44 %    MONOCYTES 11 4.0 - 12.0 %    EOSINOPHILS 2 0.5 - 7.8 %    BASOPHILS 0 0.0 - 2.0 %    IMMATURE GRANULOCYTES 1 0.0 - 5.0 %    ABS. NEUTROPHILS 2.7 1.7 - 8.2 K/UL    ABS. LYMPHOCYTES 0.6 0.5 - 4.6 K/UL    ABS. MONOCYTES 0.4 0.1 - 1.3 K/UL    ABS. EOSINOPHILS 0.1 0.0 - 0.8 K/UL    ABS. BASOPHILS 0.0 0.0 - 0.2 K/UL    ABS. IMM. GRANS. 0.0 0.0 - 0.5 K/UL       Discharge Exam:  Patient Vitals for the past 24 hrs:   Temp Pulse Resp BP SpO2   03/11/19 1029 98 °F (36.7 °C) 68 16 145/71 97 %   03/11/19 0721 97 °F (36.1 °C) 74 20 118/60 98 %   03/11/19 0513 97.4 °F (36.3 °C) 60 18 151/76 90 %   03/10/19 2344 97.6 °F (36.4 °C) 65 18 154/75 98 %   03/10/19 1910 98.6 °F (37 °C) 71 19 164/70 100 %     Oxygen Therapy  O2 Sat (%): 97 % (03/11/19 1029)  Pulse via Oximetry: 70 beats per minute (03/08/19 1403)  O2 Device: Room air (03/08/19 1037)    Intake/Output Summary (Last 24 hours) at 3/11/2019 1323  Last data filed at 3/11/2019 0513  Gross per 24 hour   Intake 1076 ml   Output 1375 ml   Net -299 ml         Physical exam:  General:    Well nourished. Alert. No distress. Eyes:   Normal sclera. Extraocular movements intact. ENT:  Normocephalic, atraumatic. Moist mucous membranes  CV:   Regular rate and rhythm. No murmur, rub, or gallop. Lungs:  Clear to auscultation bilaterally. No wheezing, rhonchi, or rales. Abdomen: Soft, nontender, nondistended. Bowel sounds normal.   Extremities: Warm and dry. No cyanosis or edema. Neurologic: No focal deficits  Skin:     No rashes or jaundice. Psych:  Normal mood and affect. Discharge Info:   Current Discharge Medication List      START taking these medications    Details   acetaminophen (TYLENOL) 325 mg tablet Take 2 Tabs by mouth every six (6) hours as needed for Pain or Fever.   Qty: 60 Tab, Refills: 0 Associated Diagnoses: Encounter for palliative care      HYDROcodone-acetaminophen (NORCO) 5-325 mg per tablet Take 1 Tab by mouth every four (4) hours as needed for Pain for up to 3 days. Max Daily Amount: 6 Tabs. Qty: 24 Tab, Refills: 0    Associated Diagnoses: Hospice care      LORazepam (ATIVAN) 2 mg/mL injection 0.5 mL by IntraVENous route every four (4) hours as needed (anxiety agitation) for up to 4 days. Max Daily Amount: 6 mg. Qty: 1 Vial, Refills: 0    Associated Diagnoses: Hospice care      morphine 2 mg/mL injection 0.5 mL by IntraVENous route every four (4) hours as needed for Pain for up to 4 days. Max Daily Amount: 6 mg. Qty: 24 mL, Refills: 0    Associated Diagnoses: Hospice care      naloxone (NARCAN) 0.4 mg/mL injection 1 mL by IntraVENous route as needed for Other (Give if breaths per minute is less than 10, may repeat dose in 15 min and contact MD). Qty: 1 mL, Refills: 0      ziprasidone in sterile water (preservative free) injection 0.5 mL by IntraMUSCular route every twelve (12) hours as needed (delerium) for up to 30 days. Qty: 30 mL, Refills: 0         CONTINUE these medications which have NOT CHANGED    Details   bisacodyl (DULCOLAX) 10 mg suppository Insert 10 mg into rectum daily as needed. Qty: 30 Suppository, Refills: 0      omeprazole (PRILOSEC) 20 mg capsule Take 1 Cap by mouth daily.   Qty: 30 Cap, Refills: 3    Associated Diagnoses: Gastroesophageal reflux disease, esophagitis presence not specified         STOP taking these medications       senna-docusate (PERICOLACE) 8.6-50 mg per tablet Comments:   Reason for Stopping:         polyethylene glycol (MIRALAX) 17 gram packet Comments:   Reason for Stopping:         buPROPion XL (WELLBUTRIN XL) 150 mg tablet Comments:   Reason for Stopping:         lovastatin (MEVACOR) 40 mg tablet Comments:   Reason for Stopping:         cyanocobalamin (VITAMIN B12) 500 mcg tablet Comments:   Reason for Stopping:         aspirin 81 mg chewable tablet Comments:   Reason for Stopping:                 Disposition: SNF  Hospice house  Activity: Activity as tolerated  Diet: DIET CLEAR LIQUID    Follow-up Appointments   Procedures    FOLLOW UP VISIT Appointment in: 3 - 5 Days Asap with hospice house providers     Asap with hospice house providers     Standing Status:   Standing     Number of Occurrences:   1     Order Specific Question:   Appointment in     Answer:   3 - 5 Days         Follow-up Information     Follow up With Specialties Details Why Contact Info    Marylou Delgadillo MD Internal Medicine   [address] [facility] [address] [place] [address]  [phone]                Time spent in patient discharge planning and coordination 42min

## 2019-03-11 NOTE — PROGRESS NOTES
Cm met with pt's dx. Pt lives alone at [facility]. DME includes a walker. No home O2. PCp and insurance verified. [address] sees pt daily for medications and an aide comes to the home 3x weekly for bathing. Pt's dx concerned about his well being if returning to [facility]. Per palliative, Hospice consulted. CM left dx a facilities list for STR as well. Cm will follow and proceed with appropriate DC planning. Care Management Interventions  PCP Verified by CM:  Yes  Transition of Care Consult (CM Consult): Discharge Planning  Physical Therapy Consult: Yes  Occupational Therapy Consult: Yes  Current Support Network: Own Home, Lives Alone  Confirm Follow Up Transport: Family  Plan discussed with Pt/Family/Caregiver: Yes  Freedom of Choice Offered: Yes  Discharge Location  Discharge Placement: Unable to determine at this time

## 2019-03-11 NOTE — H&P
History and Physical    Patient: Javi Romero MRN: [id number]  SSN: [id number]    YOB: 1920  Age: 80 y.o. Sex: male      Subjective:      Javi Romero is a 80 y.o. male who had been living in a facility but had been fairly independent in his ADL's who developed acute onset abdominal pain and presented to the ER on 3/8/19. He has known hx of macrocytic anemia and vitamin B12 deficiency as well as past surgical hx of cholecystectomy and hernia repair. Diagnostics performed in the hospital revealed a partial distal small bowel obstruction and that he was not a surgical candidate. Over the last few days he has shown persistent altered mental state and fluctuating tolerance/interest in clear liquids. His family acting on his behalf have elected comfort care measures to be provided at [facility] and he has been admitted GIP for dx of partial small bowel obstruction for management of pain, nausea/vomiting, agitation, and family/pt support. He is unaccompanied at this time. Past Medical History:   Diagnosis Date    Acute kidney injury ([address] 2018    CAP (community acquired pneumonia) 2018    Diverticulitis     Essential (primary) hypertension     GERD (gastroesophageal reflux disease)     Hyperlipidemia     Memory loss     Osteoporosis     Pulmonary embolism ([address]     only one episode with simultaneous DVT.   Patient and family deny hypercoaguable work up   Bradley Varicella     Volvulus of colon ([address]      Past Surgical History:   Procedure Laterality Date    HX HERNIA REPAIR      HX LAP CHOLECYSTECTOMY      HX TONSIL AND ADENOIDECTOMY        Family History   Problem Relation Age of Onset    Diabetes Mother      Social History     Tobacco Use    Smoking status: Former Smoker     Last attempt to quit: 1980     Years since quittin.2    Smokeless tobacco: Never Used   Substance Use Topics    Alcohol use: No     Alcohol/week: 0.0 oz     Frequency: Never     Comment: daily Prior to Admission medications    Medication Sig Start Date End Date Taking? Authorizing Provider   acetaminophen (TYLENOL) 325 mg tablet Take 2 Tabs by mouth every six (6) hours as needed for Pain or Fever. 3/11/19   Jeremy Parekh,    HYDROcodone-acetaminophen (NORCO) 5-325 mg per tablet Take 1 Tab by mouth every four (4) hours as needed for Pain for up to 3 days. Max Daily Amount: 6 Tabs. 3/11/19 3/14/19  Jeremy Parekh, DO   LORazepam (ATIVAN) 2 mg/mL injection 0.5 mL by IntraVENous route every four (4) hours as needed (anxiety agitation) for up to 4 days. Max Daily Amount: 6 mg. 3/11/19 3/15/19  Ru Baum DO   morphine 2 mg/mL injection 0.5 mL by IntraVENous route every four (4) hours as needed for Pain for up to 4 days. Max Daily Amount: 6 mg. 3/11/19 3/15/19  Jeremy Parekh,    naloxone [facility]) 0.4 mg/mL injection 1 mL by IntraVENous route as needed for Other (Give if breaths per minute is less than 10, may repeat dose in 15 min and contact MD). 3/11/19   Ru Baum DO   ziprasidone in sterile water (preservative free) injection 0.5 mL by IntraMUSCular route every twelve (12) hours as needed (delerium) for up to 30 days. 3/11/19 4/10/19  Jeremy Parekh DO   bisacodyl (DULCOLAX) 10 mg suppository Insert 10 mg into rectum daily as needed. 2/16/19   Lois Mejia MD   omeprazole (PRILOSEC) 20 mg capsule Take 1 Cap by mouth daily. 1/8/19   Igor Newton MD        No Known Allergies    Review of Systems:  Pertinent items are noted in the History of Present Illness. Objective: There were no vitals filed for this visit. Physical Exam:  GENERAL: no distress, pale  LUNG: clear to auscultation bilaterally  HEART: regular rate and rhythm, S1, S2 normal, no murmur, click, rub or gallop. + distal pulses. ABDOMEN: soft, concave, mildly tender on palpation with some attempt to guard. Bowel sounds active but distant.  No masses,  no organomegaly  : Gary catheter insitu and patent with moderate amount of clear yellow urine  EXTREMITIES: extremities normal, atraumatic, no cyanosis or edema  SKIN: Dry and cool to touch. Normal and no rash or abnormalities except for excoriation to periarea and stage II to right scapula x 2 with dressings dry and intact. NEUROLOGIC: Poorly responsive. Appears sedated. Bed bound. Does not respond to stimulation verbally, answer questions, or participate in exam. Medicated prior to transfer and per med records has required lap belt restraint due to combative behavior. PSYCHIATRIC: calm at present. Assessment:     Hospital Problems  Date Reviewed: 3/11/2019          Codes Class Noted POA    Debility ICD-10-CM: R53.81  ICD-9-CM: 799.3  Unknown Yes        Blood in stool ICD-10-CM: K92.1  ICD-9-CM: 578.1  3/11/2019 Yes        Nausea and vomiting ICD-10-CM: R11.2  ICD-9-CM: 787.01  3/8/2019 Yes        PANKAJ (acute kidney injury) ([facility] ICD-10-CM: N17.9  ICD-9-CM: 584.9  3/8/2019 Yes        * (Principal) Partial small bowel obstruction ([facility] [address] ICD-10-CM: K56.600  ICD-9-CM: 560.9  3/8/2019 Yes        Memory change ICD-10-CM: R41.3  ICD-9-CM: 780.93  Unknown Yes              Plan:     Current Facility-Administered Medications   Medication Dose Route Frequency    sodium chloride (NS) flush 3 mL  3 mL IntraVENous Q12H    sodium chloride (NS) flush 3 mL  3 mL IntraVENous PRN    morphine injection 2 mg  2 mg SubCUTAneous Q20MIN PRN    Or    morphine injection 2 mg  2 mg IntraVENous Q20MIN PRN    haloperidol lactate (HALDOL) injection 2 mg  2 mg SubCUTAneous Q1H PRN    Or    haloperidol lactate (HALDOL) injection 2 mg  2 mg IntraVENous Q1H PRN    acetaminophen (TYLENOL) suppository 650 mg  650 mg Rectal Q3H PRN    LORazepam (ATIVAN) injection 1 mg  1 mg IntraVENous Q4H PRN    glycopyrrolate (ROBINUL) injection 0.2 mg  0.2 mg IntraVENous Q4H PRN     1.   Admitted GIP for dx of partial small bowel obstruction for management of pain, nausea/vomiting, agitation, and family/pt support. 2. Pain: PRN morphine 2 mg IV or SQ. Has PIV at present. 3. Nausea/vomiting: PRN haldol 2 mg IV or SQ. Clear liquid diet. Avoid bowel meds at this time. All meds IV or SQ until when/if bowel pattern established. 4. Agitation: PRN haldol 2 mg IV or SQ. Will utilize this first for behaviors and then if unable to be managed may use Geodon as utilized in hospital.     5. Family/pt support: No family at bedside during exam. Discussed plan of care including meds with primary RN. Continue to monitor and palliate symptoms as they arise. PPS 20%.     Signed By: Jessica Carr NP     March 11, 2019

## 2019-03-11 NOTE — CONSULTS
Palliative Care    Patient: Aishwarya Valenzuela MRN: [id number]  SSN: [id number]    YOB: 1920  Age: 80 y.o. Sex: male       Date of Request: 3/10/2019  Date of Consult:  3/11/2019  Reason for Consult:  advanced care plan planning/end of life and medical decision making  Requesting Physician: Dr. Varinder Kan     Assessment/Plan:     Principal Diagnosis:    Altered Mental Status R41.82-managed by hospitalists; has received risperdal and now has Geodon ordered. Last dose ativan 4am 3/11/2019. Pt resting quietly. Additional Diagnoses:   · Debility, Unspecified  R53.81-at present, requiring total care  · Delirium  F05-Geodon  · Counseling, Encounter for Medical Advice  Z71.9  · Encounter for Palliative Care  Z51.5    Palliative Performance Scale (PPS):  PPS: 20    Medical Decision Making:   Reviewed and summarized H&P and notes since admission. Discussed case with appropriate providers: discussed with nursing and MD.  Reviewed laboratory and x-ray data: CBC, BMP    Lengthy discussion with pt daughter in law at bedside. States that all family on board with plan for a focus on comfort and quality of life. The logistics of hospice are complex for this pt who was living in independent living prior to admission, with family aware that he needed more care. Will consult hospice and have pt evaluated for [facility] due to current needs for IV management of symptoms. While creatinine has improved with IVF, suspect will revert back once IVF stopped. Daughter in law states that pt being at home with son and herself is not an option. Pt other son lives in [place]. Pt does not appear to meet criteria for OFELIA at this time either. May need to consider SNF placement if [facility] not an option, however can not have hospice concurrent with rehab days. Surgery has signed off, abdominal distention with some improvement.     Will discuss findings with members of the interdisciplinary team.      Thank you for this referral. .    Subjective:     History obtained from:  Family and Chart    Chief Complaint: ams/delirium/sbo  History of Present Illness: This is a 80year old gentleman admitted 3/8/2019 from independent living where he was noted to have large amount emesis in garbage, confusion. In ED, found to have PANKAJ, creatinine 3.0. CT head negative. CT A/P showed new high grade partial small bowel distal obstruction. Pt not felt to be a surgical candidate. His abdominal distention has decreased as per imagine 3/10/2019. Pt has had significant delirium, ams since admission, requiring ativan, risperdal.  Now has geodon prn. Pt appears comfortable throughout visit, having receiving ativan this morning. His creatinine today has decreased to 1.0 with IVF since admission. He is eating/drinking minimally if at all at this time. Pt has 2 sons, one locally and one in [place]. Advance Directive: Yes       Code Status:  DNR            Health Care Power of : Yes - Copy of [address] on file. Past Medical History:   Diagnosis Date    Acute kidney injury ([address] 2018    CAP (community acquired pneumonia) 2018    Diverticulitis     Essential (primary) hypertension     GERD (gastroesophageal reflux disease)     Hyperlipidemia     Memory loss     Osteoporosis     Pulmonary embolism ([address]     only one episode with simultaneous DVT.   Patient and family deny hypercoaguable work up   [employer] Varicella     Volvulus of colon ([address]       Past Surgical History:   Procedure Laterality Date    HX HERNIA REPAIR      HX LAP CHOLECYSTECTOMY      HX TONSIL AND ADENOIDECTOMY       Family History   Problem Relation Age of Onset    Diabetes Mother       Social History     Tobacco Use    Smoking status: Former Smoker     Last attempt to quit: 1980     Years since quittin.2    Smokeless tobacco: Never Used   Substance Use Topics    Alcohol use: No     Alcohol/week: 0.0 oz     Frequency: Never Comment: daily     Prior to Admission medications    Medication Sig Start Date End Date Taking? Authorizing Provider   senna-docusate (PERICOLACE) 8.6-50 mg per tablet Take 1 Tab by mouth daily. 2/16/19   Deejay Valles MD   bisacodyl (DULCOLAX) 10 mg suppository Insert 10 mg into rectum daily as needed. 2/16/19   Deejay Valles MD   polyethylene glycol [facility]) 17 gram packet Take 1 Packet by mouth two (2) times a day. 2/16/19   Deejay Valles MD   buPROPion XL (WELLBUTRIN XL) 150 mg tablet Take 1 Tab by mouth every morning. 1/8/19   Nacho Gutierrez MD   lovastatin (MEVACOR) 40 mg tablet Take 1 Tab by mouth nightly. 1/8/19   Nacho Gutierrez MD   omeprazole (PRILOSEC) 20 mg capsule Take 1 Cap by mouth daily. 1/8/19   Nacho Gutierrez MD   cyanocobalamin (VITAMIN B12) 500 mcg tablet Take 1 Tab by mouth daily. 1/3/19   Scarlet Rajput MD   aspirin 81 mg chewable tablet Take 81 mg by mouth daily. Provider, Historical       No Known Allergies     Review of Systems:  Review of systems not obtained due to patient factors. Pt unresponsive. Objective:     Visit Vitals  /60 (BP 1 Location: Right arm, BP Patient Position: At rest)   Pulse 74   Temp 97 °F (36.1 °C)   Resp 20   Ht 5' 2\" (1.575 m)   Wt 143 lb (64.9 kg)   SpO2 98%   BMI 26.16 kg/m²        Physical Exam:    General:  Cooperative. No acute distress. Daughter in law at bedside. Eyes:  Conjunctivae/corneas clear    Nose: Nares normal. Septum midline. Neck: Supple, symmetrical, trachea midline, no JVD   Lungs:   Clear to auscultation bilaterally, unlabored   Heart:  Regular rate and rhythm, no murmur    Abdomen:   Soft   Extremities: Normal, atraumatic, no cyanosis or edema   Skin: Skin color, texture, turgor normal. No rash or lesions.    Neurologic: Nonfocal   Psych: sedated      Assessment:     Hospital Problems  Date Reviewed: 2/21/2019          Codes Class Noted POA    Nausea and vomiting ICD-10-CM: R11.2  ICD-9-CM: 787.01  3/8/2019 Yes        PANKAJ (acute kidney injury) ([facility] ICD-10-CM: N17.9  ICD-9-CM: 584.9  3/8/2019 Yes        Acute metabolic encephalopathy RTK-10-BX: G93.41  ICD-9-CM: 348.31  3/8/2019 Yes        * (Principal) Partial small bowel obstruction ([facility] ICD-10-CM: K56.600  ICD-9-CM: 560.9  3/8/2019 Yes              Signed By: Celia Gil NP     March 11, 2019

## 2019-03-11 NOTE — PROGRESS NOTES
Pt to DC to [facility] today at [facility]. Room 107. Vincent Pinto RN will reach out to [facility] RN for report. MD notified.

## 2019-03-11 NOTE — PROGRESS NOTES
Hourly rounds completed this shift. All needs met. Bed low/locked. No c/o pain. Pt alert with agitation during the night, attempting to get out of bed multiple times. Bed alarm on. PRN ativan administered-pt asleep in bed. Call light in reach. Will continue to monitor pt and give report to oncoming RN.      Peripheral IV 03/11/19 Right Antecubital (Active)   Site Assessment Clean, dry, & intact 3/11/2019  4:10 AM   Phlebitis Assessment 0 3/11/2019  4:10 AM   Infiltration Assessment 0 3/11/2019  4:10 AM   Dressing Status Clean, dry, & intact 3/11/2019  4:10 AM   Dressing Type Transparent;Tape 3/11/2019  4:10 AM   Hub Color/Line Status Pink;Flushed;Patent 3/11/2019  4:10 AM

## 2019-03-11 NOTE — PROGRESS NOTES
[facility] called and stated they would not be able to pick patient up until 4:30pm. Hospice notified.

## 2019-03-12 NOTE — PROGRESS NOTES
Report received from off going RN. Assumed care of patient at this time. ID by name and . No s/sx of pain. No distress noted. RR even and unlabored. Bed low and locked. Safety maintained. Door open for continuous monitoring.

## 2019-03-12 NOTE — PROGRESS NOTES
This note will not be viewable in [address] No NAHUN outreach indicated due to discharge to hospice.

## 2019-03-12 NOTE — PROGRESS NOTES
Report received from Audrey Godfrey. Patient identified by name and . Patient sleeping quietly in bed, snoring loudly. No signs or symptoms of distress noted at present. Bed in low and locked position, side rails up x2, call bell within reach, tab alarm in place. No family present at bedside. Door open for continuous monitoring. 1000 Son Brad Wadsworth and daughter in law Ankita Garcia present at bedside for visit. They discussed patient's condition with NP Beti Kim. 1140 Tab alarm going off, patient had his legs off the bed attempting to get up. Moaning and resisting repositioning. Haldol 2 mg IV given for agitation. 1337 Haldol 2 mg IV given for agitation, patient trying to get out of bed, moaning. 1347 Morphine 2 mg IV given for FLACC score 8/10.      1528 ativan and morphine given IV for agitation and pain. Patient trying to get out of bed. 1736 haldol and morphine given IV for agitation and pain.

## 2019-03-12 NOTE — PROGRESS NOTES
Problem: Falls - Risk of  Goal: *Absence of Falls  Document Sunita Fall Risk and appropriate interventions in the flowsheet. Outcome: Progressing Towards Goal  Fall Risk Interventions:  Mobility Interventions: Bed/chair exit alarm    Mentation Interventions: Bed/chair exit alarm, Door open when patient unattended, Evaluate medications/consider consulting pharmacy, Update white board, More frequent rounding    Medication Interventions: Bed/chair exit alarm, Evaluate medications/consider consulting pharmacy    Elimination Interventions: Bed/chair exit alarm, Call light in reach    History of Falls Interventions: Bed/chair exit alarm, Door open when patient unattended, Evaluate medications/consider consulting pharmacy        Problem: Pressure Injury - Risk of  Goal: *Prevention of pressure injury  Document Ankit Scale and appropriate interventions in the flowsheet. Outcome: Progressing Towards Goal  Pressure Injury Interventions:  Sensory Interventions: Assess changes in LOC, Check visual cues for pain, Float heels, Keep linens dry and wrinkle-free, Minimize linen layers, Pressure redistribution bed/mattress (bed type), Turn and reposition approx. every two hours (pillows and wedges if needed)    Moisture Interventions: Absorbent underpads, Apply protective barrier, creams and emollients, Assess need for specialty bed, Internal/External urinary devices, Limit adult briefs, Maintain skin hydration (lotion/cream), Minimize layers, Moisture barrier    Activity Interventions: Pressure redistribution bed/mattress(bed type)    Mobility Interventions: Turn and reposition approx.  every two hours(pillow and wedges), Pressure redistribution bed/mattress (bed type)    Nutrition Interventions: Document food/fluid/supplement intake    Friction and Shear Interventions: Apply protective barrier, creams and emollients, Lift sheet

## 2019-03-13 NOTE — PROGRESS NOTES
Pt without v/s or response to any stimuli  per observation, auscultation and palpation. Agatha Trotter 79 with Patricia Corley, daughter in law/POA at this time. Tearful/appropriate of news. Will return call to nursing desk within the hour to give decision on mortuary. Emotional support provided. 0148 Body released to ROBIN Glass of [facility].

## 2019-03-13 NOTE — PROGRESS NOTES
Pt identified by name/. Pt grimacing deeply,moaning with agitation of extremities. Medicated as per orders for pain,agitation. Flacc 6/10. Repositioned for comfort. Bed in low and locked position with side rails up x2 and call light in reach. Door left open as pt is unaccompanied.

## 2019-03-13 NOTE — PROGRESS NOTES
Report received. Pt round. Pt identified by name. In bed with eyes closed. No s/s of pain, agitation or dyspnea. Pt is snoring loudly. Attempted to reposition. Bed low and SR up with tab alerts on.

## 2019-04-01 NOTE — PROGRESS NOTES
Late entry due to hands on patient care. Patient oxygen saturation 85-88 % on room air while sleeping. Patient place on 2 liters nasal cannula. Will continue to monitor. No restrictions

## 2021-07-21 NOTE — PROGRESS NOTES
Bedside, Verbal and Written shift change report given to Rc Pickens RN (oncoming nurse) by Mitchell Layne RN (offgoing nurse). Report included the following information SBAR, Kardex, Procedure Summary, Intake/Output, MAR, Recent Results and Cardiac Rhythm SB/NSR. 20.5